# Patient Record
Sex: MALE | Race: WHITE | NOT HISPANIC OR LATINO | URBAN - METROPOLITAN AREA
[De-identification: names, ages, dates, MRNs, and addresses within clinical notes are randomized per-mention and may not be internally consistent; named-entity substitution may affect disease eponyms.]

---

## 2018-09-25 ENCOUNTER — OUTPATIENT (OUTPATIENT)
Dept: OUTPATIENT SERVICES | Facility: HOSPITAL | Age: 48
LOS: 1 days | Discharge: HOME | End: 2018-09-25

## 2018-09-26 DIAGNOSIS — G47.33 OBSTRUCTIVE SLEEP APNEA (ADULT) (PEDIATRIC): ICD-10-CM

## 2021-02-13 ENCOUNTER — TRANSCRIPTION ENCOUNTER (OUTPATIENT)
Age: 51
End: 2021-02-13

## 2021-02-24 ENCOUNTER — TRANSCRIPTION ENCOUNTER (OUTPATIENT)
Age: 51
End: 2021-02-24

## 2021-03-15 ENCOUNTER — TRANSCRIPTION ENCOUNTER (OUTPATIENT)
Age: 51
End: 2021-03-15

## 2021-07-26 ENCOUNTER — TRANSCRIPTION ENCOUNTER (OUTPATIENT)
Age: 51
End: 2021-07-26

## 2021-08-12 ENCOUNTER — TRANSCRIPTION ENCOUNTER (OUTPATIENT)
Age: 51
End: 2021-08-12

## 2021-11-21 ENCOUNTER — TRANSCRIPTION ENCOUNTER (OUTPATIENT)
Age: 51
End: 2021-11-21

## 2021-11-26 ENCOUNTER — TRANSCRIPTION ENCOUNTER (OUTPATIENT)
Age: 51
End: 2021-11-26

## 2021-12-22 ENCOUNTER — TRANSCRIPTION ENCOUNTER (OUTPATIENT)
Age: 51
End: 2021-12-22

## 2021-12-30 ENCOUNTER — TRANSCRIPTION ENCOUNTER (OUTPATIENT)
Age: 51
End: 2021-12-30

## 2022-12-09 ENCOUNTER — APPOINTMENT (OUTPATIENT)
Dept: CARDIOLOGY | Facility: CLINIC | Age: 52
End: 2022-12-09

## 2022-12-09 VITALS
DIASTOLIC BLOOD PRESSURE: 88 MMHG | BODY MASS INDEX: 30.49 KG/M2 | SYSTOLIC BLOOD PRESSURE: 132 MMHG | OXYGEN SATURATION: 97 % | WEIGHT: 213 LBS | HEART RATE: 71 BPM | HEIGHT: 70 IN

## 2022-12-09 DIAGNOSIS — Z78.9 OTHER SPECIFIED HEALTH STATUS: ICD-10-CM

## 2022-12-09 PROCEDURE — 93000 ELECTROCARDIOGRAM COMPLETE: CPT

## 2022-12-09 PROCEDURE — 99204 OFFICE O/P NEW MOD 45 MIN: CPT | Mod: 25

## 2022-12-09 RX ORDER — ESZOPICLONE 3 MG/1
3 TABLET, COATED ORAL
Refills: 0 | Status: ACTIVE | COMMUNITY

## 2022-12-09 NOTE — HISTORY OF PRESENT ILLNESS
[FreeTextEntry1] : Mr. Greenberg is a 52-year-old man with history of hyperlipidemia presenting for evaluation of an abnormal ECG and intermittent dyspnea/chest discomfort.\par \par At baseline patient is very physically active, exercising several times a week with occasional high intensity interval training. 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 Last week he had a cramping left upper abdominal discomfort that passed after eructating and has not returned since.\par \par ECG today shows NSR, normal axis, normal intervals.\par ECG at an OSH from last year showed inferior and lateral TWI.

## 2022-12-09 NOTE — ASSESSMENT
[FreeTextEntry1] : Mr. Greenberg is a 52-year-old man with history of hyperlipidemia presenting for evaluation of an abnormal ECG and intermittent dyspnea/chest discomfort.\par \par Impression:\par (1) Dyspnea on exertion, though still has a high exercise tolerance. Chest discomfort is non-cardiac by description (non-exertional, not better with rest, predominantly LUQ) and unlikely to be cardiac in origin.\par (2) Abnormal ECG - prior ECG with nonspecific T wave abnormality.\par (3) Mild HLD, \par \par Plan:\par - Check TTE\par - Check cardiometabolic labs\par - If patient develops atypical or typical chest discomfort, can consider for CCTA vs exercise stress testing.\par - Discussed lifestyle changes.\par \par RTC after the above testing.

## 2023-01-03 ENCOUNTER — APPOINTMENT (OUTPATIENT)
Dept: CARDIOLOGY | Facility: CLINIC | Age: 53
End: 2023-01-03

## 2023-01-30 ENCOUNTER — APPOINTMENT (OUTPATIENT)
Dept: CARDIOLOGY | Facility: CLINIC | Age: 53
End: 2023-01-30

## 2023-03-02 ENCOUNTER — APPOINTMENT (OUTPATIENT)
Dept: CARDIOLOGY | Facility: CLINIC | Age: 53
End: 2023-03-02
Payer: COMMERCIAL

## 2023-03-02 PROCEDURE — 93306 TTE W/DOPPLER COMPLETE: CPT

## 2023-03-03 ENCOUNTER — APPOINTMENT (OUTPATIENT)
Dept: CARDIOLOGY | Facility: CLINIC | Age: 53
End: 2023-03-03
Payer: COMMERCIAL

## 2023-03-03 VITALS
HEIGHT: 70 IN | DIASTOLIC BLOOD PRESSURE: 80 MMHG | BODY MASS INDEX: 30.92 KG/M2 | WEIGHT: 216 LBS | SYSTOLIC BLOOD PRESSURE: 120 MMHG

## 2023-03-03 DIAGNOSIS — R06.02 SHORTNESS OF BREATH: ICD-10-CM

## 2023-03-03 PROCEDURE — 99213 OFFICE O/P EST LOW 20 MIN: CPT | Mod: 25

## 2023-03-03 PROCEDURE — 93000 ELECTROCARDIOGRAM COMPLETE: CPT

## 2023-03-03 NOTE — ASSESSMENT
[FreeTextEntry1] : Mr. Greenberg is a 52-year-old man with history of hyperlipidemia presenting for evaluation of an abnormal ECG and intermittent dyspnea/chest discomfort.\par \par Impression:\par (1) Dyspnea on exertion, though still has a high exercise tolerance. Chest discomfort is non-cardiac by description (non-exertional, not better with rest, predominantly LUQ) and unlikely to be cardiac in origin.\par (2) Abnormal ECG - prior ECG with nonspecific T wave abnormality.\par (3) Mild HLD, ASCVD 3.8%\par \par Plan:\par - Discussed results from TTE and labs in detail.\par - Discussed the utility overall of CAC scoring. Patient is interested - will facilitate.\par - Repeat labs in 6-12 months to reassess LDL.\par - Lifestyle discussed\par \par RTC after the above testing.

## 2023-03-03 NOTE — HISTORY OF PRESENT ILLNESS
[FreeTextEntry1] : Mr. Greenberg is a 52-year-old man with history of hyperlipidemia presenting for evaluation of an abnormal ECG and intermittent dyspnea/chest discomfort.\par \par At baseline patient is very physically active, exercising several times a week with occasional high intensity interval training. 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 Last week he had a cramping left upper abdominal discomfort that passed after eructating and has not returned since.\par \par ECG today shows NSR, normal axis, normal intervals.\par ECG at an OSH from last year showed inferior and lateral TWI.\par \par TTE 3/2023\par - Normal biventricular function, no significant valvular disease\par \par Labs:\par - , HDL 41, TG 80, , non-\par - Cr 0.88, K 4.3\par - A1c 5.1%, pBNP <10, TSH 2.7, lp(a) 30, CRP 0.5 (<8)

## 2023-07-14 ENCOUNTER — NON-APPOINTMENT (OUTPATIENT)
Age: 53
End: 2023-07-14

## 2024-02-10 ENCOUNTER — INPATIENT (INPATIENT)
Facility: HOSPITAL | Age: 54
LOS: 1 days | Discharge: HOME | DRG: 392 | End: 2024-02-12
Attending: INTERNAL MEDICINE | Admitting: INTERNAL MEDICINE
Payer: COMMERCIAL

## 2024-02-10 VITALS
SYSTOLIC BLOOD PRESSURE: 185 MMHG | HEIGHT: 70 IN | DIASTOLIC BLOOD PRESSURE: 82 MMHG | TEMPERATURE: 98 F | WEIGHT: 205.03 LBS | RESPIRATION RATE: 18 BRPM | OXYGEN SATURATION: 99 % | HEART RATE: 107 BPM

## 2024-02-10 DIAGNOSIS — K57.92 DIVERTICULITIS OF INTESTINE, PART UNSPECIFIED, WITHOUT PERFORATION OR ABSCESS WITHOUT BLEEDING: ICD-10-CM

## 2024-02-10 LAB
ALBUMIN SERPL ELPH-MCNC: 4 G/DL — SIGNIFICANT CHANGE UP (ref 3.5–5.2)
ALP SERPL-CCNC: 70 U/L — SIGNIFICANT CHANGE UP (ref 30–115)
ALT FLD-CCNC: 19 U/L — SIGNIFICANT CHANGE UP (ref 0–41)
ANION GAP SERPL CALC-SCNC: 9 MMOL/L — SIGNIFICANT CHANGE UP (ref 7–14)
APPEARANCE UR: CLEAR — SIGNIFICANT CHANGE UP
AST SERPL-CCNC: 18 U/L — SIGNIFICANT CHANGE UP (ref 0–41)
BACTERIA # UR AUTO: ABNORMAL /HPF
BASOPHILS # BLD AUTO: 0.03 K/UL — SIGNIFICANT CHANGE UP (ref 0–0.2)
BASOPHILS NFR BLD AUTO: 0.2 % — SIGNIFICANT CHANGE UP (ref 0–1)
BILIRUB SERPL-MCNC: 0.5 MG/DL — SIGNIFICANT CHANGE UP (ref 0.2–1.2)
BILIRUB UR-MCNC: NEGATIVE — SIGNIFICANT CHANGE UP
BUN SERPL-MCNC: 7 MG/DL — LOW (ref 10–20)
CALCIUM SERPL-MCNC: 9.1 MG/DL — SIGNIFICANT CHANGE UP (ref 8.4–10.5)
CHLORIDE SERPL-SCNC: 88 MMOL/L — LOW (ref 98–110)
CO2 SERPL-SCNC: 27 MMOL/L — SIGNIFICANT CHANGE UP (ref 17–32)
COLOR SPEC: YELLOW — SIGNIFICANT CHANGE UP
CREAT SERPL-MCNC: 1 MG/DL — SIGNIFICANT CHANGE UP (ref 0.7–1.5)
DIFF PNL FLD: ABNORMAL
EGFR: 91 ML/MIN/1.73M2 — SIGNIFICANT CHANGE UP
EOSINOPHIL # BLD AUTO: 0.04 K/UL — SIGNIFICANT CHANGE UP (ref 0–0.7)
EOSINOPHIL NFR BLD AUTO: 0.2 % — SIGNIFICANT CHANGE UP (ref 0–8)
GLUCOSE SERPL-MCNC: 113 MG/DL — HIGH (ref 70–99)
GLUCOSE UR QL: NEGATIVE MG/DL — SIGNIFICANT CHANGE UP
HCT VFR BLD CALC: 38.5 % — LOW (ref 42–52)
HGB BLD-MCNC: 13.8 G/DL — LOW (ref 14–18)
IMM GRANULOCYTES NFR BLD AUTO: 0.4 % — HIGH (ref 0.1–0.3)
KETONES UR-MCNC: NEGATIVE MG/DL — SIGNIFICANT CHANGE UP
LACTATE SERPL-SCNC: 0.8 MMOL/L — SIGNIFICANT CHANGE UP (ref 0.7–2)
LEUKOCYTE ESTERASE UR-ACNC: NEGATIVE — SIGNIFICANT CHANGE UP
LIDOCAIN IGE QN: 24 U/L — SIGNIFICANT CHANGE UP (ref 7–60)
LYMPHOCYTES # BLD AUTO: 1.46 K/UL — SIGNIFICANT CHANGE UP (ref 1.2–3.4)
LYMPHOCYTES # BLD AUTO: 9.1 % — LOW (ref 20.5–51.1)
MCHC RBC-ENTMCNC: 32.1 PG — HIGH (ref 27–31)
MCHC RBC-ENTMCNC: 35.8 G/DL — SIGNIFICANT CHANGE UP (ref 32–37)
MCV RBC AUTO: 89.5 FL — SIGNIFICANT CHANGE UP (ref 80–94)
MONOCYTES # BLD AUTO: 1 K/UL — HIGH (ref 0.1–0.6)
MONOCYTES NFR BLD AUTO: 6.2 % — SIGNIFICANT CHANGE UP (ref 1.7–9.3)
NEUTROPHILS # BLD AUTO: 13.49 K/UL — HIGH (ref 1.4–6.5)
NEUTROPHILS NFR BLD AUTO: 83.9 % — HIGH (ref 42.2–75.2)
NITRITE UR-MCNC: NEGATIVE — SIGNIFICANT CHANGE UP
NRBC # BLD: 0 /100 WBCS — SIGNIFICANT CHANGE UP (ref 0–0)
PH UR: 7 — SIGNIFICANT CHANGE UP (ref 5–8)
PLATELET # BLD AUTO: 385 K/UL — SIGNIFICANT CHANGE UP (ref 130–400)
PMV BLD: 7.9 FL — SIGNIFICANT CHANGE UP (ref 7.4–10.4)
POTASSIUM SERPL-MCNC: 4.1 MMOL/L — SIGNIFICANT CHANGE UP (ref 3.5–5)
POTASSIUM SERPL-SCNC: 4.1 MMOL/L — SIGNIFICANT CHANGE UP (ref 3.5–5)
PROT SERPL-MCNC: 6.8 G/DL — SIGNIFICANT CHANGE UP (ref 6–8)
PROT UR-MCNC: NEGATIVE MG/DL — SIGNIFICANT CHANGE UP
RBC # BLD: 4.3 M/UL — LOW (ref 4.7–6.1)
RBC # FLD: 11.5 % — SIGNIFICANT CHANGE UP (ref 11.5–14.5)
RBC CASTS # UR COMP ASSIST: 1 /HPF — SIGNIFICANT CHANGE UP (ref 0–4)
SODIUM SERPL-SCNC: 124 MMOL/L — LOW (ref 135–146)
SP GR SPEC: 1.01 — SIGNIFICANT CHANGE UP (ref 1–1.03)
UROBILINOGEN FLD QL: 0.2 MG/DL — SIGNIFICANT CHANGE UP (ref 0.2–1)
WBC # BLD: 16.08 K/UL — HIGH (ref 4.8–10.8)
WBC # FLD AUTO: 16.08 K/UL — HIGH (ref 4.8–10.8)
WBC UR QL: 1 /HPF — SIGNIFICANT CHANGE UP (ref 0–5)

## 2024-02-10 PROCEDURE — 84100 ASSAY OF PHOSPHORUS: CPT

## 2024-02-10 PROCEDURE — 36415 COLL VENOUS BLD VENIPUNCTURE: CPT

## 2024-02-10 PROCEDURE — 74177 CT ABD & PELVIS W/CONTRAST: CPT | Mod: 26,MA

## 2024-02-10 PROCEDURE — 83735 ASSAY OF MAGNESIUM: CPT

## 2024-02-10 PROCEDURE — 93005 ELECTROCARDIOGRAM TRACING: CPT

## 2024-02-10 PROCEDURE — 85025 COMPLETE CBC W/AUTO DIFF WBC: CPT

## 2024-02-10 PROCEDURE — 99285 EMERGENCY DEPT VISIT HI MDM: CPT

## 2024-02-10 PROCEDURE — 99222 1ST HOSP IP/OBS MODERATE 55: CPT

## 2024-02-10 PROCEDURE — 99223 1ST HOSP IP/OBS HIGH 75: CPT

## 2024-02-10 PROCEDURE — 99221 1ST HOSP IP/OBS SF/LOW 40: CPT

## 2024-02-10 PROCEDURE — 93010 ELECTROCARDIOGRAM REPORT: CPT

## 2024-02-10 PROCEDURE — 80053 COMPREHEN METABOLIC PANEL: CPT

## 2024-02-10 RX ORDER — SODIUM CHLORIDE 9 MG/ML
1000 INJECTION, SOLUTION INTRAVENOUS
Refills: 0 | Status: DISCONTINUED | OUTPATIENT
Start: 2024-02-10 | End: 2024-02-12

## 2024-02-10 RX ORDER — ACETAMINOPHEN 500 MG
650 TABLET ORAL EVERY 6 HOURS
Refills: 0 | Status: DISCONTINUED | OUTPATIENT
Start: 2024-02-10 | End: 2024-02-12

## 2024-02-10 RX ORDER — ESZOPICLONE 2 MG/1
1 TABLET, COATED ORAL
Refills: 0 | DISCHARGE

## 2024-02-10 RX ORDER — ONDANSETRON 8 MG/1
4 TABLET, FILM COATED ORAL ONCE
Refills: 0 | Status: COMPLETED | OUTPATIENT
Start: 2024-02-10 | End: 2024-02-10

## 2024-02-10 RX ORDER — CIPROFLOXACIN LACTATE 400MG/40ML
400 VIAL (ML) INTRAVENOUS EVERY 12 HOURS
Refills: 0 | Status: DISCONTINUED | OUTPATIENT
Start: 2024-02-11 | End: 2024-02-12

## 2024-02-10 RX ORDER — MORPHINE SULFATE 50 MG/1
2 CAPSULE, EXTENDED RELEASE ORAL EVERY 6 HOURS
Refills: 0 | Status: DISCONTINUED | OUTPATIENT
Start: 2024-02-10 | End: 2024-02-12

## 2024-02-10 RX ORDER — METRONIDAZOLE 500 MG
500 TABLET ORAL EVERY 8 HOURS
Refills: 0 | Status: DISCONTINUED | OUTPATIENT
Start: 2024-02-10 | End: 2024-02-12

## 2024-02-10 RX ORDER — ONDANSETRON 8 MG/1
4 TABLET, FILM COATED ORAL EVERY 8 HOURS
Refills: 0 | Status: DISCONTINUED | OUTPATIENT
Start: 2024-02-10 | End: 2024-02-12

## 2024-02-10 RX ORDER — METRONIDAZOLE 500 MG
500 TABLET ORAL ONCE
Refills: 0 | Status: COMPLETED | OUTPATIENT
Start: 2024-02-10 | End: 2024-02-10

## 2024-02-10 RX ORDER — MORPHINE SULFATE 50 MG/1
4 CAPSULE, EXTENDED RELEASE ORAL ONCE
Refills: 0 | Status: DISCONTINUED | OUTPATIENT
Start: 2024-02-10 | End: 2024-02-10

## 2024-02-10 RX ORDER — LANOLIN ALCOHOL/MO/W.PET/CERES
3 CREAM (GRAM) TOPICAL AT BEDTIME
Refills: 0 | Status: DISCONTINUED | OUTPATIENT
Start: 2024-02-10 | End: 2024-02-12

## 2024-02-10 RX ORDER — SODIUM CHLORIDE 9 MG/ML
1000 INJECTION INTRAMUSCULAR; INTRAVENOUS; SUBCUTANEOUS ONCE
Refills: 0 | Status: COMPLETED | OUTPATIENT
Start: 2024-02-10 | End: 2024-02-10

## 2024-02-10 RX ADMIN — ONDANSETRON 4 MILLIGRAM(S): 8 TABLET, FILM COATED ORAL at 15:38

## 2024-02-10 RX ADMIN — MORPHINE SULFATE 4 MILLIGRAM(S): 50 CAPSULE, EXTENDED RELEASE ORAL at 18:36

## 2024-02-10 RX ADMIN — MORPHINE SULFATE 2 MILLIGRAM(S): 50 CAPSULE, EXTENDED RELEASE ORAL at 23:19

## 2024-02-10 RX ADMIN — Medication 100 MILLIGRAM(S): at 21:27

## 2024-02-10 RX ADMIN — SODIUM CHLORIDE 100 MILLILITER(S): 9 INJECTION, SOLUTION INTRAVENOUS at 19:55

## 2024-02-10 RX ADMIN — Medication 100 MILLIGRAM(S): at 18:38

## 2024-02-10 RX ADMIN — SODIUM CHLORIDE 1000 MILLILITER(S): 9 INJECTION INTRAMUSCULAR; INTRAVENOUS; SUBCUTANEOUS at 15:38

## 2024-02-10 NOTE — ED PROVIDER NOTE - CLINICAL SUMMARY MEDICAL DECISION MAKING FREE TEXT BOX
53-year-old male presents to the ED for abdominal pain.  Recently treated for diverticulitis.  On Cipro and Flagyl.  Due to persistent pain patient came in for evaluation.  Noted to be tachycardic here.  Diffuse lower abdominal pain.  Labs reviewed by me noted to have leukocytosis with hyponatremia and hypochloremia likely from persistent diarrhea.  CT revealed sigmoid diverticulitis with developing abscess.  Started on parenteral antibiotics.  Admitted to medicine.

## 2024-02-10 NOTE — H&P ADULT - NSHPPHYSICALEXAM_GEN_ALL_CORE
Vital Signs Last 24 Hrs  T(C): 36.7 (10 Feb 2024 14:42), Max: 36.7 (10 Feb 2024 14:42)  T(F): 98.1 (10 Feb 2024 14:42), Max: 98.1 (10 Feb 2024 14:42)  HR: 107 (10 Feb 2024 14:42) (107 - 107)  BP: 185/82 (10 Feb 2024 14:42) (185/82 - 185/82)  RR: 18 (10 Feb 2024 14:42) (18 - 18)  SpO2: 99% (10 Feb 2024 14:42) (99% - 99%)    Parameters below as of 10 Feb 2024 14:42  Patient On (Oxygen Delivery Method): room air    PHYSICAL EXAM:  GENERAL: NAD, well-developed  HEAD:  Atraumatic, Normocephalic  EYES: EOMI, PERRLA, conjunctiva and sclera clear  CHEST/LUNG: Clear to auscultation bilaterally; No wheeze  HEART: Regular rate and rhythm; No murmurs, rubs, or gallops. distal pulses 2+ and equal bilateral  ABDOMEN: Soft, nondistended + TTP low abdomen  EXTREMITIES:  No clubbing, cyanosis, or edema  CNS: AAOx3

## 2024-02-10 NOTE — ED PROVIDER NOTE - PHYSICAL EXAMINATION
Physical Exam    Vital Signs: I have reviewed the initial vital signs.  Constitutional: appears stated age, no acute distress  Eyes: Sclera clear, EOMI.  Cardiovascular: S1 and S2, regular rate, regular rhythm, well-perfused extremities, radial pulses equal and 2+, pedal pulses 2+ and equal  Respiratory: unlabored respiratory effort, clear to auscultation bilaterally no wheezing, rales, or rhonchi  Gastrointestinal:  abdomen soft, +tenderness to left lower quadrant  Musculoskeletal: supple neck, no lower extremity edema  Integumentary: warm, dry, no rash  Neurologic: awake, alert, oriented x3, extremities’ motor and sensory functions grossly intact

## 2024-02-10 NOTE — H&P ADULT - NS ATTEND AMEND GEN_ALL_CORE FT
Pt presents with acute diverticulitis with possible abscess/phlegmon.   -IV abx ciprofloxacin and Flagyl  -IVF  -Clear liquid diet for now  Surgery, GI consults consult    Continue home meds as ordered.

## 2024-02-10 NOTE — PATIENT PROFILE ADULT - FALL HARM RISK - UNIVERSAL INTERVENTIONS
Bed in lowest position, wheels locked, appropriate side rails in place/Call bell, personal items and telephone in reach/Instruct patient to call for assistance before getting out of bed or chair/Non-slip footwear when patient is out of bed/[place] to call system/Physically safe environment - no spills, clutter or unnecessary equipment/Purposeful Proactive Rounding/Room/bathroom lighting operational, light cord in reach

## 2024-02-10 NOTE — H&P ADULT - HISTORY OF PRESENT ILLNESS
Patient is a 53 year old male with hx of diverticulosis (1 flare Oct 2023) presenting with 3 days of worsening low abdominal pain associated with non-bloody diarrhea. Patient was started on cipro and flagyl PO outpatient without improvement to symptoms. Patient had colonoscopy 4 weeks ago as follow up after resolution of first diverticulitis flare in October 2023 which revealed polyp and diverticulosis.   Patient denies fever, chills, cp, sob, nausea, vomiting. + dysuria, resolved

## 2024-02-10 NOTE — CONSULT NOTE ADULT - NSCONSULTADDITIONALINFOA_GEN_ALL_CORE
I saw and examined the patient.  Agree with above assessment plan.  Extensive discussion with the patient at bedside reviewed the CT findings and the clinical course of diverticulitis.  I explained the relationship of fiber and diverticulosis to the development of diverticulitis.  All his questions were answered.  Will continue nonoperative management.  The patient may need repeat CT scan in the future however this will be determined by the clinical course.

## 2024-02-10 NOTE — ED PROVIDER NOTE - OBJECTIVE STATEMENT
53-year-old male with past medical history diverticulosis presents with complaint abdominal pain.  Patient reports he has had symptoms of lower abdominal pain, abdominal bloating, diarrhea (6-8 times per day, nonbloody), as well as dysuria since Wednesday.  Reports on Thursday his GI sent him ciprofloxacin and Flagyl, which he began that day.  Reports full resolution of dysuria since then, but reports he still has abdominal pain and generalized malaise, prompting emergency department evaluation.  Denies fever/chills, chest pain, shortness of breath, vomiting, lightheadedness, dizziness.

## 2024-02-10 NOTE — CONSULT NOTE ADULT - SUBJECTIVE AND OBJECTIVE BOX
Patient is a 54yo m w/ pmhx of diverticulosis presenting w/ complaints of generalized lower abdominal pain since Wednesday. Reports constant pain, 7/10, associated w/ intermittent subjective fever and chills. Also endorses fatigue, severe bloating and non bloody diarrhea for the past dew days. Reports spoke w/ his GI Dr. Velazco two days ago who prescribed him cipro and flagyl but no relief in the sxs prompting ED visit today. Reports last colonoscopy on  w/ finding of diverticulosis, last flare up of diverticulitis was back in september last year. Denies n/v, chest pain, sob, melena, hematochezia, urinary sxs.     PAST MEDICAL & SURGICAL HISTORY:  Insomnia  Diverticulosis    MEDICATIONS  (STANDING):  lactated ringers. 1000 milliLiter(s) (100 mL/Hr) IV Continuous <Continuous>  levoFLOXacin IVPB 500 milliGRAM(s) IV Intermittent once  metroNIDAZOLE  IVPB 500 milliGRAM(s) IV Intermittent every 8 hours  metroNIDAZOLE  IVPB 500 milliGRAM(s) IV Intermittent Once  morphine  - Injectable 4 milliGRAM(s) IV Push Once    MEDICATIONS  (PRN):  acetaminophen     Tablet .. 650 milliGRAM(s) Oral every 6 hours PRN Temp greater or equal to 38C (100.4F), Mild Pain (1 - 3)  melatonin 3 milliGRAM(s) Oral at bedtime PRN Insomnia  ondansetron Injectable 4 milliGRAM(s) IV Push every 8 hours PRN Nausea and/or Vomiting    Allergies  penicillin (Unknown)  Intolerances    Physical Exam:  General: NAD, resting comfortably  HEENT: EOMI, [place] clear   Pulmonary: CTA B/L  Cardiovascular: S1, S2 w/RRR  Abdominal: soft, mild distension, mild ttp LLQ, no rebound/guarding   Extremities:  no clubbing/cyanosis/edema  Neuro: A/O x 3    Vital Signs Last 24 Hrs  T(C): 36.7 (10 Feb 2024 14:42), Max: 36.7 (10 Feb 2024 14:42)  T(F): 98.1 (10 Feb 2024 14:42), Max: 98.1 (10 Feb 2024 14:42)  HR: 107 (10 Feb 2024 14:42) (107 - 107)  BP: 185/82 (10 Feb 2024 14:42) (185/82 - 185/82)  RR: 18 (10 Feb 2024 14:42) (18 - 18)  SpO2: 99% (10 Feb 2024 14:42) (99% - 99%)    Parameters below as of 10 Feb 2024 14:42  Patient On (Oxygen Delivery Method): room air      LABS:                        13.8   16.08 )-----------( 385      ( 10 Feb 2024 15:40 )             38.5     0210    124<L>  |  88<L>  |  7<L>  ----------------------------<  113<H>  4.1   |  27  |  1.0    Ca    9.1      10 Feb 2024 15:40    TPro  6.8  /  Alb  4.0  /  TBili  0.5  /  DBili  x   /  AST  18  /  ALT  19  /  AlkPhos  70  02-10      Urinalysis Basic - ( 10 Feb 2024 16:30 )    Color: Yellow / Appearance: Clear / S.006 / pH: x  Gluc: x / Ketone: Negative mg/dL  / Bili: Negative / Urobili: 0.2 mg/dL   Blood: x / Protein: Negative mg/dL / Nitrite: Negative   Leuk Esterase: Negative / RBC: 1 /HPF / WBC 1 /HPF   Sq Epi: x / Non Sq Epi: x / Bacteria: Occasional /HPF      LIVER FUNCTIONS - ( 10 Feb 2024 15:40 )  Alb: 4.0 g/dL / Pro: 6.8 g/dL / ALK PHOS: 70 U/L / ALT: 19 U/L / AST: 18 U/L / GGT: x             Cultures:      RADIOLOGY & ADDITIONAL STUDIES:    < from: CT Abdomen and Pelvis w/ IV Cont (02.10.24 @ 16:29) >  IMPRESSION:  1.  Acute sigmoid diverticulitis.  2.  Adjacent to the inflamed colon there appears to be few locules of   extraluminal air along with fluid and stranding though difficult to   separate from the adjacent small bowel loops (series 301 image 81) which   may represent phlegmon/developing abscess however no discrete walled off   drainable collection is identified at this time. No gross   pneumoperitoneum. Small amount free fluid along the right paracolic   gutter. If no clinical improvement, short interval follow-up can be   considered to assess for abscess formation.  --- End of Report ---

## 2024-02-10 NOTE — H&P ADULT - NSHPLABSRESULTS_GEN_ALL_CORE
13.8   16.08 )-----------( 385      ( 10 Feb 2024 15:40 )             38.5     02-10    124<L>  |  88<L>  |  7<L>  ----------------------------<  113<H>  4.1   |  27  |  1.0    Ca    9.1      10 Feb 2024 15:40    TPro  6.8  /  Alb  4.0  /  TBili  0.5  /  DBili  x   /  AST  18  /  ALT  19  /  AlkPhos  70  02-10          Urinalysis Basic - ( 10 Feb 2024 16:30 )    Color: Yellow / Appearance: Clear / S.006 / pH: x  Gluc: x / Ketone: Negative mg/dL  / Bili: Negative / Urobili: 0.2 mg/dL   Blood: x / Protein: Negative mg/dL / Nitrite: Negative   Leuk Esterase: Negative / RBC: 1 /HPF / WBC 1 /HPF   Sq Epi: x / Non Sq Epi: x / Bacteria: Occasional /HPF    Lactate Trend  02-10 @ 15:40 Lactate:0.8       CT Abdomen and Pelvis w/ IV Cont (02.10.24 @ 16:29)     IMPRESSION:  1.  Acute sigmoid diverticulitis.  2.  Adjacent to the inflamed colon there appears to be few locules of   extraluminal air along with fluid and stranding though difficult to   separate from the adjacent small bowel loops (series 301 image 81) which   may represent phlegmon/developing abscess however no discrete walled off   drainable collection is identified at this time. No gross   pneumoperitoneum. Small amount free fluid along the right paracolic   gutter. If no clinical improvement, short interval follow-up can be   considered to assess for abscess formation.

## 2024-02-11 LAB
ALBUMIN SERPL ELPH-MCNC: 3.5 G/DL — SIGNIFICANT CHANGE UP (ref 3.5–5.2)
ALP SERPL-CCNC: 59 U/L — SIGNIFICANT CHANGE UP (ref 30–115)
ALT FLD-CCNC: 14 U/L — SIGNIFICANT CHANGE UP (ref 0–41)
ANION GAP SERPL CALC-SCNC: 8 MMOL/L — SIGNIFICANT CHANGE UP (ref 7–14)
AST SERPL-CCNC: 14 U/L — SIGNIFICANT CHANGE UP (ref 0–41)
BASOPHILS # BLD AUTO: 0.03 K/UL — SIGNIFICANT CHANGE UP (ref 0–0.2)
BASOPHILS NFR BLD AUTO: 0.3 % — SIGNIFICANT CHANGE UP (ref 0–1)
BILIRUB SERPL-MCNC: 0.5 MG/DL — SIGNIFICANT CHANGE UP (ref 0.2–1.2)
BUN SERPL-MCNC: 6 MG/DL — LOW (ref 10–20)
CALCIUM SERPL-MCNC: 8.6 MG/DL — SIGNIFICANT CHANGE UP (ref 8.4–10.5)
CHLORIDE SERPL-SCNC: 98 MMOL/L — SIGNIFICANT CHANGE UP (ref 98–110)
CO2 SERPL-SCNC: 26 MMOL/L — SIGNIFICANT CHANGE UP (ref 17–32)
CREAT SERPL-MCNC: 0.8 MG/DL — SIGNIFICANT CHANGE UP (ref 0.7–1.5)
CULTURE RESULTS: NO GROWTH — SIGNIFICANT CHANGE UP
EGFR: 106 ML/MIN/1.73M2 — SIGNIFICANT CHANGE UP
EOSINOPHIL # BLD AUTO: 0.05 K/UL — SIGNIFICANT CHANGE UP (ref 0–0.7)
EOSINOPHIL NFR BLD AUTO: 0.5 % — SIGNIFICANT CHANGE UP (ref 0–8)
GLUCOSE SERPL-MCNC: 106 MG/DL — HIGH (ref 70–99)
HCT VFR BLD CALC: 35 % — LOW (ref 42–52)
HGB BLD-MCNC: 12.6 G/DL — LOW (ref 14–18)
IMM GRANULOCYTES NFR BLD AUTO: 0.5 % — HIGH (ref 0.1–0.3)
LYMPHOCYTES # BLD AUTO: 1.13 K/UL — LOW (ref 1.2–3.4)
LYMPHOCYTES # BLD AUTO: 10.2 % — LOW (ref 20.5–51.1)
MAGNESIUM SERPL-MCNC: 1.9 MG/DL — SIGNIFICANT CHANGE UP (ref 1.8–2.4)
MCHC RBC-ENTMCNC: 32.3 PG — HIGH (ref 27–31)
MCHC RBC-ENTMCNC: 36 G/DL — SIGNIFICANT CHANGE UP (ref 32–37)
MCV RBC AUTO: 89.7 FL — SIGNIFICANT CHANGE UP (ref 80–94)
MONOCYTES # BLD AUTO: 0.74 K/UL — HIGH (ref 0.1–0.6)
MONOCYTES NFR BLD AUTO: 6.7 % — SIGNIFICANT CHANGE UP (ref 1.7–9.3)
NEUTROPHILS # BLD AUTO: 9.1 K/UL — HIGH (ref 1.4–6.5)
NEUTROPHILS NFR BLD AUTO: 81.8 % — HIGH (ref 42.2–75.2)
NRBC # BLD: 0 /100 WBCS — SIGNIFICANT CHANGE UP (ref 0–0)
PHOSPHATE SERPL-MCNC: 2.5 MG/DL — SIGNIFICANT CHANGE UP (ref 2.1–4.9)
PLATELET # BLD AUTO: 381 K/UL — SIGNIFICANT CHANGE UP (ref 130–400)
PMV BLD: 8 FL — SIGNIFICANT CHANGE UP (ref 7.4–10.4)
POTASSIUM SERPL-MCNC: 4.5 MMOL/L — SIGNIFICANT CHANGE UP (ref 3.5–5)
POTASSIUM SERPL-SCNC: 4.5 MMOL/L — SIGNIFICANT CHANGE UP (ref 3.5–5)
PROT SERPL-MCNC: 5.9 G/DL — LOW (ref 6–8)
RBC # BLD: 3.9 M/UL — LOW (ref 4.7–6.1)
RBC # FLD: 11.6 % — SIGNIFICANT CHANGE UP (ref 11.5–14.5)
SODIUM SERPL-SCNC: 132 MMOL/L — LOW (ref 135–146)
SPECIMEN SOURCE: SIGNIFICANT CHANGE UP
WBC # BLD: 11.11 K/UL — HIGH (ref 4.8–10.8)
WBC # FLD AUTO: 11.11 K/UL — HIGH (ref 4.8–10.8)

## 2024-02-11 PROCEDURE — 93010 ELECTROCARDIOGRAM REPORT: CPT

## 2024-02-11 PROCEDURE — 99223 1ST HOSP IP/OBS HIGH 75: CPT

## 2024-02-11 PROCEDURE — 99232 SBSQ HOSP IP/OBS MODERATE 35: CPT

## 2024-02-11 RX ADMIN — MORPHINE SULFATE 2 MILLIGRAM(S): 50 CAPSULE, EXTENDED RELEASE ORAL at 06:12

## 2024-02-11 RX ADMIN — Medication 100 MILLIGRAM(S): at 13:24

## 2024-02-11 RX ADMIN — Medication 200 MILLIGRAM(S): at 17:43

## 2024-02-11 RX ADMIN — MORPHINE SULFATE 2 MILLIGRAM(S): 50 CAPSULE, EXTENDED RELEASE ORAL at 04:27

## 2024-02-11 RX ADMIN — SODIUM CHLORIDE 100 MILLILITER(S): 9 INJECTION, SOLUTION INTRAVENOUS at 17:45

## 2024-02-11 RX ADMIN — Medication 100 MILLIGRAM(S): at 21:45

## 2024-02-11 RX ADMIN — MORPHINE SULFATE 2 MILLIGRAM(S): 50 CAPSULE, EXTENDED RELEASE ORAL at 05:31

## 2024-02-11 RX ADMIN — Medication 200 MILLIGRAM(S): at 07:35

## 2024-02-11 RX ADMIN — Medication 100 MILLIGRAM(S): at 05:26

## 2024-02-11 NOTE — CONSULT NOTE ADULT - ASSESSMENT
52yo male h/o diverticular disease presents with abdominal pain and diarrhea with CT imaging showing acute sigmoid diverticulitis with possible phlegmon/developing abscess      #Acute sigmoid diverticulitis, recurrent  CT revealing few locules of  extraluminal air along with fluid and stranding which   may represent phlegmon/developing abscess  -Recommend   -Continue IV antibiotics  -Serial abd exams  -If clinical worsening, rising leucocytosis, fever/chills within 24-48 hours recommend repeat CT abd/pelvis  -Surgery following 52yo male h/o diverticular disease/diverticulitis presents with abdominal pain and diarrhea x 2-3 days with CT imaging showing acute sigmoid diverticulitis with possible phlegmon/developing abscess.       #Acute sigmoid diverticulitis, recurrent  CT revealing few locules of  extraluminal air along with fluid and stranding which   may represent phlegmon/developing abscess  Colonoscopy in 1/2024 in [place] revealing 5mm ascending colon polyp and left sided diverticulosis (report reviewed)  -Recommend clear liquid diet for now if ok per surgery  -Continue IV antibiotics  -Serial abd exams  -If clinical worsening, rising leucocytosis, fever/chills within 24-48 hours recommend repeat CT abd/pelvis  -Surgery following  -Recent colonoscopy noted, pt will resume outpt follow up with outside GI in [place]   Discussed with medicine attending

## 2024-02-11 NOTE — PROGRESS NOTE ADULT - ASSESSMENT
53 year old male with hx of diverticulosis (1 flare Oct 2023) presenting with 3 days of worsening low abdominal pain associated with non-bloody diarrhea, found to have acute diverticulitis with phlegmon/early abscess.  Patient had colonoscopy 4 weeks ago as follow up after resolution of first diverticulitis flare in October 2023 which revealed polyp and diverticulosis.     # acute sigmoid diverticulitis with phlegmon  - clear liquid diet  - IVF LR @100  - pain control  - IV cipro, flagyl  - surgery consult appreciated  - GI consult appreciated  - zofran PRN    # hyponatremia, hypochloremia  - IVF LR @ 100, Na improved    # elevated BP likely 2/2 pain, monitor    # insomnia  - family to bring home [place]    Diet: clear  Activity: OOB  DVT PPX: not indicated  GI PPX: not indicated  CHG:  not indicated  Code status: FULL CODE  Dispo: acute from home

## 2024-02-11 NOTE — PROGRESS NOTE ADULT - SUBJECTIVE AND OBJECTIVE BOX
Patient seen at bedside. No acute complaints at time. No acute events noted overnight.   Reports abdominal pain and bloating significantly improved, had one loose BM over night and + Flatus. Denies fever, chills, n/v.    Vital Signs Last 24 Hrs  T(C): 35.8 (2024 05:19), Max: 36.7 (10 Feb 2024 14:42)  T(F): 96.4 (2024 05:19), Max: 98.1 (10 Feb 2024 14:42)  HR: 73 (2024 05:19) (73 - 107)  BP: 132/68 (2024 05:19) (132/68 - 185/82)  RR: 18 (2024 05:19) (18 - 18)  SpO2: 97% (10 Feb 2024 19:22) (97% - 99%)    Parameters below as of 10 Feb 2024 19:22  Patient On (Oxygen Delivery Method): room air    General Appearance: NAD  Chest: CTA B/L  CV: S1 S2  Abdomen: Soft, mild distention, no rebound/gaurding  Extremities: No edema BLE  CNS: A/O x 3    MEDICATIONS  (STANDING):  ciprofloxacin   IVPB 400 milliGRAM(s) IV Intermittent every 12 hours  lactated ringers. 1000 milliLiter(s) (100 mL/Hr) IV Continuous <Continuous>  metroNIDAZOLE  IVPB 500 milliGRAM(s) IV Intermittent every 8 hours    MEDICATIONS  (PRN):  acetaminophen     Tablet .. 650 milliGRAM(s) Oral every 6 hours PRN Temp greater or equal to 38C (100.4F), Mild Pain (1 - 3)  melatonin 3 milliGRAM(s) Oral at bedtime PRN Insomnia  morphine  - Injectable 2 milliGRAM(s) IV Push every 6 hours PRN Severe Pain (7 - 10)  ondansetron Injectable 4 milliGRAM(s) IV Push every 8 hours PRN Nausea and/or Vomiting  oxycodone    5 mG/acetaminophen 325 mG 1 Tablet(s) Oral every 6 hours PRN Moderate Pain (4 - 6)      LABS:                        12.6   11.11 )-----------( 381      ( 2024 11:53 )             35.0     02-10    124<L>  |  88<L>  |  7<L>  ----------------------------<  113<H>  4.1   |  27  |  1.0    Ca    9.1      10 Feb 2024 15:40    TPro  6.8  /  Alb  4.0  /  TBili  0.5  /  DBili  x   /  AST  18  /  ALT  19  /  AlkPhos  70  02-10      Urinalysis Basic - ( 10 Feb 2024 16:30 )    Color: Yellow / Appearance: Clear / S.006 / pH: x  Gluc: x / Ketone: Negative mg/dL  / Bili: Negative / Urobili: 0.2 mg/dL   Blood: x / Protein: Negative mg/dL / Nitrite: Negative   Leuk Esterase: Negative / RBC: 1 /HPF / WBC 1 /HPF   Sq Epi: x / Non Sq Epi: x / Bacteria: Occasional /HPF        RADIOLOGY & ADDITIONAL STUDIES: Reviewed

## 2024-02-11 NOTE — PROGRESS NOTE ADULT - SUBJECTIVE AND OBJECTIVE BOX
53 year old male with hx of diverticulosis (1 flare Oct 2023) presenting with 3 days of worsening low abdominal pain associated with non-bloody diarrhea, found to have acute diverticulitis with phlegmon/early abscess.  Patient had colonoscopy 4 weeks ago as follow up after resolution of first diverticulitis flare in October 2023 which revealed polyp and diverticulosis.     Today:  Seen at bedside, no new complaints.              REVIEW OF SYSTEMS:  No new complaints      MEDICATIONS  (STANDING):  ciprofloxacin   IVPB 400 milliGRAM(s) IV Intermittent every 12 hours  lactated ringers. 1000 milliLiter(s) (100 mL/Hr) IV Continuous <Continuous>  metroNIDAZOLE  IVPB 500 milliGRAM(s) IV Intermittent every 8 hours    MEDICATIONS  (PRN):  acetaminophen     Tablet .. 650 milliGRAM(s) Oral every 6 hours PRN Temp greater or equal to 38C (100.4F), Mild Pain (1 - 3)  melatonin 3 milliGRAM(s) Oral at bedtime PRN Insomnia  morphine  - Injectable 2 milliGRAM(s) IV Push every 6 hours PRN Severe Pain (7 - 10)  ondansetron Injectable 4 milliGRAM(s) IV Push every 8 hours PRN Nausea and/or Vomiting  oxycodone    5 mG/acetaminophen 325 mG 1 Tablet(s) Oral every 6 hours PRN Moderate Pain (4 - 6)      Allergies  penicillin (Unknown)          Vital Signs Last 24 Hrs  T(C): 35.9 (11 Feb 2024 15:00), Max: 36.7 (10 Feb 2024 18:46)  T(F): 96.6 (11 Feb 2024 15:00), Max: 98 (10 Feb 2024 18:46)  HR: 81 (11 Feb 2024 15:00) (73 - 99)  BP: 131/71 (11 Feb 2024 15:00) (131/71 - 144/76)  RR: 18 (11 Feb 2024 15:00) (18 - 18)  SpO2: 95% (11 Feb 2024 15:00) (95% - 99%)    Parameters below as of 11 Feb 2024 15:00  Patient On (Oxygen Delivery Method): room air        PHYSICAL EXAM:  GENERAL: NAD, well-developed  HEAD:  Atraumatic, Normocephalic  EYES: EOMI, PERRLA, conjunctiva and sclera clear  CHEST/LUNG: Clear to auscultation bilaterally; No wheeze  HEART: Regular rate and rhythm; No murmurs, rubs, or gallops. distal pulses 2+ and equal bilateral  ABDOMEN: Soft, nondistended + TTP low abdomen  EXTREMITIES:  No clubbing, cyanosis, or edema  CNS: AAOx3      LABS:                        12.6   11.11 )-----------( 381      ( 11 Feb 2024 11:53 )             35.0     02-11    132<L>  |  98  |  6<L>  ----------------------------<  106<H>  4.5   |  26  |  0.8    Ca    8.6      11 Feb 2024 11:53  Phos  2.5     02-11  Mg     1.9     02-11    TPro  5.9<L>  /  Alb  3.5  /  TBili  0.5  /  DBili  x   /  AST  14  /  ALT  14  /  AlkPhos  59  02-11      Urinalysis Basic - ( 11 Feb 2024 11:53 )    Color: x / Appearance: x / SG: x / pH: x  Gluc: 106 mg/dL / Ketone: x  / Bili: x / Urobili: x   Blood: x / Protein: x / Nitrite: x   Leuk Esterase: x / RBC: x / WBC x   Sq Epi: x / Non Sq Epi: x / Bacteria: x

## 2024-02-11 NOTE — PROGRESS NOTE ADULT - ASSESSMENT
Patient is a 52yo m w/ pmhx of diverticulosis presenting w/ complaints of generalized lower abdominal pain since Wednesday. Reports constant pain, 7/10, associated w/ intermittent subjective fever and chills. Also endorses fatigue, severe bloating and non bloody diarrhea for the past dew days. Reports spoke w/ his GI Dr. Velazco two days ago who prescribed him cipro and flagyl but no relief in the sxs prompting ED visit today. Reports last colonoscopy on 01/11124 w/ finding of diverticulosis, last flare up of diverticulitis was back in september last year. Denies n/v, chest pain, sob, melena, hematochezia, urinary sxs.     #Acute sigmoid diverticulitis w/ phlegmon  - CT AP: 1.  Acute sigmoid diverticulitis.  2.  Adjacent to the inflamed colon there appears to be few locules of   extraluminal air along with fluid and stranding though difficult to   separate from the adjacent small bowel loops (series 301 image 81) which   may represent phlegmon/developing abscess however no discrete walled off   drainable collection is identified at this time. No gross   pneumoperitoneum. Small amount free fluid along the right paracolic   gutter. If no clinical improvement, short interval follow-up can be   considered to assess for abscess formation.  - Advance diet to CLD  - IV abx   - GI following   - Pain control prn  - GI ppx   - IVF, monitor BMP  - Will continue to follow      Patient seen and above discussed w. Dr. Rodas

## 2024-02-11 NOTE — CONSULT NOTE ADULT - SUBJECTIVE AND OBJECTIVE BOX
Gastroenterology Consultation:    Patient is a 53y old  Male who presents with a chief complaint of acute complicated diverticulitis (10 Feb 2024 18:17)        Admitted on: 02-10-24      HPI:  52yo male h/o diverticulosis (Oct 2023) presenting with 3 days of worsening low abdominal pain associated with non-bloody diarrhea. Patient was started on cipro and flagyl PO outpatient without improvement. Pt reports colonoscopy 4 weeks ago as follow up after resolution of first diverticulitis flare in October 2023 which revealed polyp and diverticulosis. Patient denies nausea/vomiting or fever/chills.         Prior EGD:  Prior Colonoscopy:      PAST MEDICAL & SURGICAL HISTORY:  Insomnia      Diverticulosis            FAMILY HISTORY:      Social History:  Tobacco:  Alcohol:  Drugs:    Home Medications:  eszopiclone 3 mg oral tablet: 1 tab(s) orally once a day (at bedtime) as needed for  insomnia (10 Feb 2024 18:16)        MEDICATIONS  (STANDING):  ciprofloxacin   IVPB 400 milliGRAM(s) IV Intermittent every 12 hours  lactated ringers. 1000 milliLiter(s) (100 mL/Hr) IV Continuous <Continuous>  metroNIDAZOLE  IVPB 500 milliGRAM(s) IV Intermittent every 8 hours    MEDICATIONS  (PRN):  acetaminophen     Tablet .. 650 milliGRAM(s) Oral every 6 hours PRN Temp greater or equal to 38C (100.4F), Mild Pain (1 - 3)  melatonin 3 milliGRAM(s) Oral at bedtime PRN Insomnia  morphine  - Injectable 2 milliGRAM(s) IV Push every 6 hours PRN Severe Pain (7 - 10)  ondansetron Injectable 4 milliGRAM(s) IV Push every 8 hours PRN Nausea and/or Vomiting  oxycodone    5 mG/acetaminophen 325 mG 1 Tablet(s) Oral every 6 hours PRN Moderate Pain (4 - 6)      Allergies  penicillin (Unknown)      Review of Systems:   Constitutional:  No Fever, No Chills  ENT/Mouth:  No Hearing Changes,  No Difficulty Swallowing  Eyes:  No Eye Pain, No Vision Changes  Cardiovascular:  No Chest Pain, No Palpitations  Respiratory:  No Cough, No Dyspnea  Gastrointestinal:  As described in HPI  Musculoskeletal:  No Joint Swelling, No Back Pain  Skin:  No Skin Lesions, No Jaundice  Neuro:  No Syncope, No Dizziness  Heme/Lymph:  No Bruising, No Bleeding.          Physical Examination:  T(C): 35.8 (02-11-24 @ 05:19), Max: 36.7 (02-10-24 @ 14:42)  HR: 73 (02-11-24 @ 05:19) (73 - 107)  BP: 132/68 (02-11-24 @ 05:19) (132/68 - 185/82)  RR: 18 (02-11-24 @ 05:19) (18 - 18)  SpO2: 97% (02-10-24 @ 19:22) (97% - 99%)  Height (cm): 177.8 (02-10-24 @ 19:22)  Weight (kg): 85 (02-10-24 @ 19:22)        GENERAL: AAOx3, no acute distress.  HEAD:  Atraumatic, Normocephalic  EYES: conjunctiva and sclera clear  NECK: Supple, no JVD or thyromegaly  CHEST/LUNG: Clear to auscultation bilaterally; No wheeze, rhonchi, or rales  HEART: Regular rate and rhythm; normal S1, S2, No murmurs.  ABDOMEN: Soft, nontender, nondistended; Bowel sounds present  NEUROLOGY: No asterixis or tremor.   SKIN: Intact, no jaundice        Data:                        13.8   16.08 )-----------( 385      ( 10 Feb 2024 15:40 )             38.5     Hgb Trend:  13.8  02-10-24 @ 15:40      02-10    124<L>  |  88<L>  |  7<L>  ----------------------------<  113<H>  4.1   |  27  |  1.0    Ca    9.1      10 Feb 2024 15:40    TPro  6.8  /  Alb  4.0  /  TBili  0.5  /  DBili  x   /  AST  18  /  ALT  19  /  AlkPhos  70  02-10    Liver panel trend:  TBili 0.5   /   AST 18   /   ALT 19   /   AlkP 70   /   Tptn 6.8   /   Alb 4.0    /   DBili --      02-10              Radiology:  CT Abdomen and Pelvis w/ IV Cont:   ACC: [id number] EXAM:  CT ABDOMEN AND PELVIS IC   ORDERED BY: JESSICA ROB     PROCEDURE DATE:  02/10/2024          INTERPRETATION:  CLINICAL STATEMENT: Abdominal pain.    TECHNIQUE: Contiguous axial CT images were obtained from the lower chest   to the pubic symphysis following administration of intravenous contrast.    95 cc administered of Omnipaque 350 (5 cc discarded).  Oral contrast was   not administered.  Reformatted images in the coronal and sagittal planes   were acquired.    COMPARISON CT: None.    OTHER STUDIES USED FOR CORRELATION: None.      FINDINGS:    LOWER CHEST: Bibasilar streaky linear atelectasis and/or scarring.    HEPATOBILIARY: Unremarkable.    SPLEEN: Unremarkable.    PANCREAS: Unremarkable.    ADRENAL GLANDS: Unremarkable.    KIDNEYS: Section renal enhancement. No hydronephrosis.    ABDOMINOPELVIC NODES: Unremarkable.    PELVIC ORGANS: Unremarkable.    PERITONEUM/MESENTERY/BOWEL: Colonic diverticulosis. Wall thickening with   pericolonic inflammation surrounding the sigmoid colon consistent with   acute diverticulitis. Adjacent to this area there appears to be few   locules of extraluminal air along with fluid and stranding though   difficult to separate from the adjacent small bowel loops (series 301   image 81) which may represent phlegmon/developing abscess however no   discrete walled off drainable collection is identified at this time.   Small amount of free fluid along the right paracolic gutter. No large   gross pneumoperitoneum..    BONES/SOFT TISSUES: Mild degenerative changes.      IMPRESSION:  1.  Acute sigmoid diverticulitis.  2.  Adjacent to the inflamed colon there appears to be few locules of   extraluminal air along with fluid and stranding though difficult to   separate from the adjacent small bowel loops (series 301 image 81) which   may represent phlegmon/developing abscess however no discrete walled off   drainable collection is identified at this time. No gross   pneumoperitoneum. Small amount free fluid along the right paracolic   gutter. If no clinical improvement, short interval follow-up can be   considered to assess for abscess formation.    --- End of Report ---            MARICHUY DE LOS SANTOS MD; Attending Radiologist  This document has been electronically signed. Feb 10 2024  5:24PM (02-10-24 @ 16:29)       Gastroenterology Consultation:    Patient is a 53y old  Male who presents with a chief complaint of acute complicated diverticulitis (10 Feb 2024 18:17)        Admitted on: 02-10-24      HPI:  54yo male h/o diverticulitis (Oct 2023) presenting with worsening lower abdominal pain associated with non-bloody diarrhea x 2-3 days. Pt feeling well prior has regular bm 1-2x daily at baseline. Pt had colonoscopy following prior episode in 1/2024 in [place] that showed 5mm ascending colon polyp and left sided diverticulosis, advised repeat in 5 years per report. He contacted GI in [place] and was started on cipro and flagyl PO outpatient without improvement. Notes associated chills initially, no fever. Patient denies nausea/vomiting. Appetite ok, denies weight loss. Has been eating high fiber diet. No recent NSAID use. No known family h/o colon ca.       PAST MEDICAL & SURGICAL HISTORY:  Insomnia      Diverticulosis            FAMILY HISTORY:      Social History:  Tobacco:  Alcohol:  Drugs:    Home Medications:  eszopiclone 3 mg oral tablet: 1 tab(s) orally once a day (at bedtime) as needed for  insomnia (10 Feb 2024 18:16)        MEDICATIONS  (STANDING):  ciprofloxacin   IVPB 400 milliGRAM(s) IV Intermittent every 12 hours  lactated ringers. 1000 milliLiter(s) (100 mL/Hr) IV Continuous <Continuous>  metroNIDAZOLE  IVPB 500 milliGRAM(s) IV Intermittent every 8 hours    MEDICATIONS  (PRN):  acetaminophen     Tablet .. 650 milliGRAM(s) Oral every 6 hours PRN Temp greater or equal to 38C (100.4F), Mild Pain (1 - 3)  melatonin 3 milliGRAM(s) Oral at bedtime PRN Insomnia  morphine  - Injectable 2 milliGRAM(s) IV Push every 6 hours PRN Severe Pain (7 - 10)  ondansetron Injectable 4 milliGRAM(s) IV Push every 8 hours PRN Nausea and/or Vomiting  oxycodone    5 mG/acetaminophen 325 mG 1 Tablet(s) Oral every 6 hours PRN Moderate Pain (4 - 6)      Allergies  penicillin (Unknown)      Review of Systems:   Constitutional:  No Fever, No Chills  ENT/Mouth:  No Hearing Changes,  No Difficulty Swallowing  Eyes:  No Eye Pain, No Vision Changes  Cardiovascular:  No Chest Pain, No Palpitations  Respiratory:  No Cough, No Dyspnea  Gastrointestinal:  As described in HPI  Musculoskeletal:  No Joint Swelling, No Back Pain  Skin:  No Skin Lesions, No Jaundice  Neuro:  No Syncope, No Dizziness  Heme/Lymph:  No Bruising, No Bleeding.          Physical Examination:  T(C): 35.8 (02-11-24 @ 05:19), Max: 36.7 (02-10-24 @ 14:42)  HR: 73 (02-11-24 @ 05:19) (73 - 107)  BP: 132/68 (02-11-24 @ 05:19) (132/68 - 185/82)  RR: 18 (02-11-24 @ 05:19) (18 - 18)  SpO2: 97% (02-10-24 @ 19:22) (97% - 99%)  Height (cm): 177.8 (02-10-24 @ 19:22)  Weight (kg): 85 (02-10-24 @ 19:22)        GENERAL: AAOx3, no acute distress.  HEAD:  Atraumatic, Normocephalic  EYES: conjunctiva and sclera clear  NECK: Supple, no JVD or thyromegaly  CHEST/LUNG: Clear to auscultation bilaterally; No wheeze, rhonchi, or rales  HEART: Regular rate and rhythm; normal S1, S2, No murmurs.  ABDOMEN: Soft, mildly tender in lower abdomen, nondistended; Bowel sounds present  NEUROLOGY: No asterixis or tremor.   SKIN: Intact, no jaundice        Data:                        13.8   16.08 )-----------( 385      ( 10 Feb 2024 15:40 )             38.5     Hgb Trend:  13.8  02-10-24 @ 15:40      02-10    124<L>  |  88<L>  |  7<L>  ----------------------------<  113<H>  4.1   |  27  |  1.0    Ca    9.1      10 Feb 2024 15:40    TPro  6.8  /  Alb  4.0  /  TBili  0.5  /  DBili  x   /  AST  18  /  ALT  19  /  AlkPhos  70  02-10    Liver panel trend:  TBili 0.5   /   AST 18   /   ALT 19   /   AlkP 70   /   Tptn 6.8   /   Alb 4.0    /   DBili --      02-10              Radiology:  CT Abdomen and Pelvis w/ IV Cont:   ACC: [id number] EXAM:  CT ABDOMEN AND PELVIS IC   ORDERED BY: JESSICA LIGHTTIMOYONNY     PROCEDURE DATE:  02/10/2024          INTERPRETATION:  CLINICAL STATEMENT: Abdominal pain.    TECHNIQUE: Contiguous axial CT images were obtained from the lower chest   to the pubic symphysis following administration of intravenous contrast.    95 cc administered of Omnipaque 350 (5 cc discarded).  Oral contrast was   not administered.  Reformatted images in the coronal and sagittal planes   were acquired.    COMPARISON CT: None.    OTHER STUDIES USED FOR CORRELATION: None.      FINDINGS:    LOWER CHEST: Bibasilar streaky linear atelectasis and/or scarring.    HEPATOBILIARY: Unremarkable.    SPLEEN: Unremarkable.    PANCREAS: Unremarkable.    ADRENAL GLANDS: Unremarkable.    KIDNEYS: Section renal enhancement. No hydronephrosis.    ABDOMINOPELVIC NODES: Unremarkable.    PELVIC ORGANS: Unremarkable.    PERITONEUM/MESENTERY/BOWEL: Colonic diverticulosis. Wall thickening with   pericolonic inflammation surrounding the sigmoid colon consistent with   acute diverticulitis. Adjacent to this area there appears to be few   locules of extraluminal air along with fluid and stranding though   difficult to separate from the adjacent small bowel loops (series 301   image 81) which may represent phlegmon/developing abscess however no   discrete walled off drainable collection is identified at this time.   Small amount of free fluid along the right paracolic gutter. No large   gross pneumoperitoneum..    BONES/SOFT TISSUES: Mild degenerative changes.      IMPRESSION:  1.  Acute sigmoid diverticulitis.  2.  Adjacent to the inflamed colon there appears to be few locules of   extraluminal air along with fluid and stranding though difficult to   separate from the adjacent small bowel loops (series 301 image 81) which   may represent phlegmon/developing abscess however no discrete walled off   drainable collection is identified at this time. No gross   pneumoperitoneum. Small amount free fluid along the right paracolic   gutter. If no clinical improvement, short interval follow-up can be   considered to assess for abscess formation.    --- End of Report ---            MARICHUY DE LOS SANTOS MD; Attending Radiologist  This document has been electronically signed. Feb 10 2024  5:24PM (02-10-24 @ 16:29)

## 2024-02-12 ENCOUNTER — TRANSCRIPTION ENCOUNTER (OUTPATIENT)
Age: 54
End: 2024-02-12

## 2024-02-12 VITALS
TEMPERATURE: 99 F | SYSTOLIC BLOOD PRESSURE: 136 MMHG | HEART RATE: 89 BPM | RESPIRATION RATE: 18 BRPM | DIASTOLIC BLOOD PRESSURE: 77 MMHG

## 2024-02-12 LAB
ALBUMIN SERPL ELPH-MCNC: 3.5 G/DL — SIGNIFICANT CHANGE UP (ref 3.5–5.2)
ALP SERPL-CCNC: 59 U/L — SIGNIFICANT CHANGE UP (ref 30–115)
ALT FLD-CCNC: 13 U/L — SIGNIFICANT CHANGE UP (ref 0–41)
ANION GAP SERPL CALC-SCNC: 7 MMOL/L — SIGNIFICANT CHANGE UP (ref 7–14)
AST SERPL-CCNC: 14 U/L — SIGNIFICANT CHANGE UP (ref 0–41)
BASOPHILS # BLD AUTO: 0.03 K/UL — SIGNIFICANT CHANGE UP (ref 0–0.2)
BASOPHILS NFR BLD AUTO: 0.3 % — SIGNIFICANT CHANGE UP (ref 0–1)
BILIRUB SERPL-MCNC: 0.5 MG/DL — SIGNIFICANT CHANGE UP (ref 0.2–1.2)
BUN SERPL-MCNC: 6 MG/DL — LOW (ref 10–20)
CALCIUM SERPL-MCNC: 8.6 MG/DL — SIGNIFICANT CHANGE UP (ref 8.4–10.5)
CHLORIDE SERPL-SCNC: 99 MMOL/L — SIGNIFICANT CHANGE UP (ref 98–110)
CO2 SERPL-SCNC: 28 MMOL/L — SIGNIFICANT CHANGE UP (ref 17–32)
CREAT SERPL-MCNC: 0.8 MG/DL — SIGNIFICANT CHANGE UP (ref 0.7–1.5)
EGFR: 106 ML/MIN/1.73M2 — SIGNIFICANT CHANGE UP
EOSINOPHIL # BLD AUTO: 0.08 K/UL — SIGNIFICANT CHANGE UP (ref 0–0.7)
EOSINOPHIL NFR BLD AUTO: 0.9 % — SIGNIFICANT CHANGE UP (ref 0–8)
GLUCOSE SERPL-MCNC: 116 MG/DL — HIGH (ref 70–99)
HCT VFR BLD CALC: 36.3 % — LOW (ref 42–52)
HGB BLD-MCNC: 12.6 G/DL — LOW (ref 14–18)
IMM GRANULOCYTES NFR BLD AUTO: 0.5 % — HIGH (ref 0.1–0.3)
LYMPHOCYTES # BLD AUTO: 1.04 K/UL — LOW (ref 1.2–3.4)
LYMPHOCYTES # BLD AUTO: 11.1 % — LOW (ref 20.5–51.1)
MCHC RBC-ENTMCNC: 32.3 PG — HIGH (ref 27–31)
MCHC RBC-ENTMCNC: 34.7 G/DL — SIGNIFICANT CHANGE UP (ref 32–37)
MCV RBC AUTO: 93.1 FL — SIGNIFICANT CHANGE UP (ref 80–94)
MONOCYTES # BLD AUTO: 0.78 K/UL — HIGH (ref 0.1–0.6)
MONOCYTES NFR BLD AUTO: 8.4 % — SIGNIFICANT CHANGE UP (ref 1.7–9.3)
NEUTROPHILS # BLD AUTO: 7.36 K/UL — HIGH (ref 1.4–6.5)
NEUTROPHILS NFR BLD AUTO: 78.8 % — HIGH (ref 42.2–75.2)
NRBC # BLD: 0 /100 WBCS — SIGNIFICANT CHANGE UP (ref 0–0)
PLATELET # BLD AUTO: 444 K/UL — HIGH (ref 130–400)
PMV BLD: 8.4 FL — SIGNIFICANT CHANGE UP (ref 7.4–10.4)
POTASSIUM SERPL-MCNC: 4.4 MMOL/L — SIGNIFICANT CHANGE UP (ref 3.5–5)
POTASSIUM SERPL-SCNC: 4.4 MMOL/L — SIGNIFICANT CHANGE UP (ref 3.5–5)
PROT SERPL-MCNC: 6.1 G/DL — SIGNIFICANT CHANGE UP (ref 6–8)
RBC # BLD: 3.9 M/UL — LOW (ref 4.7–6.1)
RBC # FLD: 11.7 % — SIGNIFICANT CHANGE UP (ref 11.5–14.5)
SODIUM SERPL-SCNC: 134 MMOL/L — LOW (ref 135–146)
WBC # BLD: 9.34 K/UL — SIGNIFICANT CHANGE UP (ref 4.8–10.8)
WBC # FLD AUTO: 9.34 K/UL — SIGNIFICANT CHANGE UP (ref 4.8–10.8)

## 2024-02-12 PROCEDURE — 99232 SBSQ HOSP IP/OBS MODERATE 35: CPT

## 2024-02-12 PROCEDURE — 99233 SBSQ HOSP IP/OBS HIGH 50: CPT

## 2024-02-12 PROCEDURE — 99238 HOSP IP/OBS DSCHRG MGMT 30/<: CPT

## 2024-02-12 RX ORDER — OXYCODONE AND ACETAMINOPHEN 5; 325 MG/1; MG/1
1 TABLET ORAL
Qty: 28 | Refills: 0
Start: 2024-02-12 | End: 2024-02-18

## 2024-02-12 RX ORDER — METRONIDAZOLE 500 MG
1 TABLET ORAL
Qty: 42 | Refills: 0
Start: 2024-02-12 | End: 2024-02-25

## 2024-02-12 RX ORDER — ACETAMINOPHEN 500 MG
2 TABLET ORAL
Qty: 0 | Refills: 0 | DISCHARGE
Start: 2024-02-12

## 2024-02-12 RX ORDER — CIPROFLOXACIN LACTATE 400MG/40ML
1 VIAL (ML) INTRAVENOUS
Qty: 28 | Refills: 0
Start: 2024-02-12 | End: 2024-02-25

## 2024-02-12 RX ADMIN — Medication 200 MILLIGRAM(S): at 05:39

## 2024-02-12 RX ADMIN — MORPHINE SULFATE 2 MILLIGRAM(S): 50 CAPSULE, EXTENDED RELEASE ORAL at 02:56

## 2024-02-12 RX ADMIN — MORPHINE SULFATE 2 MILLIGRAM(S): 50 CAPSULE, EXTENDED RELEASE ORAL at 06:13

## 2024-02-12 RX ADMIN — Medication 100 MILLIGRAM(S): at 13:40

## 2024-02-12 RX ADMIN — Medication 100 MILLIGRAM(S): at 05:38

## 2024-02-12 RX ADMIN — Medication 200 MILLIGRAM(S): at 17:04

## 2024-02-12 RX ADMIN — SODIUM CHLORIDE 100 MILLILITER(S): 9 INJECTION, SOLUTION INTRAVENOUS at 05:54

## 2024-02-12 NOTE — PROGRESS NOTE ADULT - SUBJECTIVE AND OBJECTIVE BOX
Patient seen at bedside. No acute complaints at time. No acute events noted overnight.   Reports abdominal pain and bloating significantly improved, had one loose BM over night and + Flatus. Denies fever, chills, n/v.    Vital Signs Last 24 Hrs  T(C): 36.1 (12 Feb 2024 04:52), Max: 36.1 (11 Feb 2024 20:40)  T(F): 97 (12 Feb 2024 04:52), Max: 97 (11 Feb 2024 20:40)  HR: 86 (12 Feb 2024 04:52) (69 - 86)  BP: 128/73 (12 Feb 2024 04:52) (128/73 - 131/71)  BP(mean): --  RR: 18 (12 Feb 2024 04:52) (18 - 18)  SpO2: 97% (11 Feb 2024 20:40) (95% - 97%)    Parameters below as of 11 Feb 2024 20:40  Patient On (Oxygen Delivery Method): room air    General Appearance: NAD  Chest: CTA B/L  CV: S1 S2  Abdomen: Soft, mild distention, no rebound/gaurding, min ttp in lower abd   Extremities: No edema BLE  CNS: A/O x 3    MEDICATIONS  (STANDING):  ciprofloxacin   IVPB 400 milliGRAM(s) IV Intermittent every 12 hours  lactated ringers. 1000 milliLiter(s) (100 mL/Hr) IV Continuous <Continuous>  metroNIDAZOLE  IVPB 500 milliGRAM(s) IV Intermittent every 8 hours    MEDICATIONS  (PRN):  acetaminophen     Tablet .. 650 milliGRAM(s) Oral every 6 hours PRN Temp greater or equal to 38C (100.4F), Mild Pain (1 - 3)  melatonin 3 milliGRAM(s) Oral at bedtime PRN Insomnia  morphine  - Injectable 2 milliGRAM(s) IV Push every 6 hours PRN Severe Pain (7 - 10)  ondansetron Injectable 4 milliGRAM(s) IV Push every 8 hours PRN Nausea and/or Vomiting  oxycodone    5 mG/acetaminophen 325 mG 1 Tablet(s) Oral every 6 hours PRN Moderate Pain (4 - 6)      02-12    134<L>  |  99  |  6<L>  ----------------------------<  116<H>  4.4   |  28  |  0.8    Ca    8.6      12 Feb 2024 06:00  Phos  2.5     02-11  Mg     1.9     02-11    TPro  6.1  /  Alb  3.5  /  TBili  0.5  /  DBili  x   /  AST  14  /  ALT  13  /  AlkPhos  59  02-12                            12.6   9.34  )-----------( 444      ( 12 Feb 2024 06:00 )             36.3     CAPILLARY BLOOD GLUCOSE      RADIOLOGY & ADDITIONAL STUDIES: Reviewed Patient seen at bedside. No acute complaints at time. No acute events noted overnight. Feels much better, a minimal amount of pain in the left lower quadrant. Tolerating clears.  Reports abdominal pain and bloating significantly improved, had one loose BM over night and + Flatus. Denies fever, chills, n/v.    Vital Signs Last 24 Hrs  T(C): 36.1 (12 Feb 2024 04:52), Max: 36.1 (11 Feb 2024 20:40)  T(F): 97 (12 Feb 2024 04:52), Max: 97 (11 Feb 2024 20:40)  HR: 86 (12 Feb 2024 04:52) (69 - 86)  BP: 128/73 (12 Feb 2024 04:52) (128/73 - 131/71)  BP(mean): --  RR: 18 (12 Feb 2024 04:52) (18 - 18)  SpO2: 97% (11 Feb 2024 20:40) (95% - 97%)    Parameters below as of 11 Feb 2024 20:40  Patient On (Oxygen Delivery Method): room air    General Appearance: NAD  Chest: CTA B/L  CV: S1 S2  Abdomen: Soft, mild distention, no rebound/gaurding, min ttp in lower abd   Extremities: No edema BLE  CNS: A/O x 3    MEDICATIONS  (STANDING):  ciprofloxacin   IVPB 400 milliGRAM(s) IV Intermittent every 12 hours  lactated ringers. 1000 milliLiter(s) (100 mL/Hr) IV Continuous <Continuous>  metroNIDAZOLE  IVPB 500 milliGRAM(s) IV Intermittent every 8 hours    MEDICATIONS  (PRN):  acetaminophen     Tablet .. 650 milliGRAM(s) Oral every 6 hours PRN Temp greater or equal to 38C (100.4F), Mild Pain (1 - 3)  melatonin 3 milliGRAM(s) Oral at bedtime PRN Insomnia  morphine  - Injectable 2 milliGRAM(s) IV Push every 6 hours PRN Severe Pain (7 - 10)  ondansetron Injectable 4 milliGRAM(s) IV Push every 8 hours PRN Nausea and/or Vomiting  oxycodone    5 mG/acetaminophen 325 mG 1 Tablet(s) Oral every 6 hours PRN Moderate Pain (4 - 6)      02-12    134<L>  |  99  |  6<L>  ----------------------------<  116<H>  4.4   |  28  |  0.8    Ca    8.6      12 Feb 2024 06:00  Phos  2.5     02-11  Mg     1.9     02-11    TPro  6.1  /  Alb  3.5  /  TBili  0.5  /  DBili  x   /  AST  14  /  ALT  13  /  AlkPhos  59  02-12                            12.6   9.34  )-----------( 444      ( 12 Feb 2024 06:00 )             36.3     CAPILLARY BLOOD GLUCOSE      RADIOLOGY & ADDITIONAL STUDIES: Reviewed

## 2024-02-12 NOTE — DISCHARGE NOTE NURSING/CASE MANAGEMENT/SOCIAL WORK - NSDCPEFALRISK_GEN_ALL_CORE
For information on Fall & Injury Prevention, visit: [email or web address] OR  [email or web address] OR  https://www.cdc.gov/steadi/patient.html

## 2024-02-12 NOTE — PROGRESS NOTE ADULT - REASON FOR ADMISSION
acute complicated diverticulitis

## 2024-02-12 NOTE — PROGRESS NOTE ADULT - NSPROGADDITIONALINFOA_GEN_ALL_CORE
I saw and examined the patient.  Agree with above assessment plan. Please see yesterday's note for details. Recommend n.p.o. versus clears until pain is resolved and then slow diet advance.  Easy to digest low residue after discharge until completion of antibiotics and resumption of normal function after several weeks.
I saw and examined the patient.  Agree with above assessment and plan.  I edited the note as needed.  Thank you

## 2024-02-12 NOTE — DISCHARGE NOTE NURSING/CASE MANAGEMENT/SOCIAL WORK - PATIENT PORTAL LINK FT
You can access the FollowMyHealth Patient Portal offered by [facility] by registering at the following website: [email or web address]. By joining [facility]’s FollowMyHealth portal, you will also be able to view your health information using other applications (apps) compatible with our system.

## 2024-02-12 NOTE — DISCHARGE NOTE PROVIDER - NSDCMRMEDTOKEN_GEN_ALL_CORE_FT
acetaminophen 325 mg oral tablet: 2 tab(s) orally every 6 hours As needed Temp greater or equal to 38C (100.4F), Mild Pain (1 - 3)  Cipro 500 mg oral tablet: 1 tab(s) orally 2 times a day  eszopiclone 3 mg oral tablet: 1 tab(s) orally once a day (at bedtime) as needed for  insomnia  metroNIDAZOLE 500 mg oral tablet: 1 tab(s) orally every 8 hours  oxycodone-acetaminophen 5 mg-325 mg oral tablet: 1 tab(s) orally every 6 hours as needed for Moderate Pain (4 - 6) MDD: 4 tablets

## 2024-02-12 NOTE — DISCHARGE NOTE PROVIDER - CARE PROVIDERS DIRECT ADDRESSES
[email or web address]

## 2024-02-12 NOTE — DISCHARGE NOTE PROVIDER - NSDCCPCAREPLAN_GEN_ALL_CORE_FT
PRINCIPAL DISCHARGE DIAGNOSIS  Diagnosis: Diverticulitis, colon  Assessment and Plan of Treatment: Please finish antibiotics as prescribed.  You will need a repeat CT scan of your abdomen and pelvis as you have scheduled.  Follow with your outpatient doctors and advance your diet slowly.  Return to the hospital for any worsening of symptoms.

## 2024-02-12 NOTE — DISCHARGE NOTE NURSING/CASE MANAGEMENT/SOCIAL WORK - NSTOBACCONEVERSMOKERY/N_GEN_A
Patient saw a comment in her chart regarding low vitamin D and B12. Advised patient those labs have not been drawn by our office and the last time they were done for her were 11/21 by a different provider, and were normal. She verbalized understanding   No

## 2024-02-12 NOTE — PROGRESS NOTE ADULT - SUBJECTIVE AND OBJECTIVE BOX
pt seen and examined.   doing well today w/ no abd pain.        PAST MEDICAL & SURGICAL HISTORY:  Insomnia  Diverticulosis          MEDICATIONS  (STANDING):  ciprofloxacin   IVPB 400 milliGRAM(s) IV Intermittent every 12 hours  lactated ringers. 1000 milliLiter(s) (100 mL/Hr) IV Continuous <Continuous>  metroNIDAZOLE  IVPB 500 milliGRAM(s) IV Intermittent every 8 hours    MEDICATIONS  (PRN):  acetaminophen     Tablet .. 650 milliGRAM(s) Oral every 6 hours PRN Temp greater or equal to 38C (100.4F), Mild Pain (1 - 3)  melatonin 3 milliGRAM(s) Oral at bedtime PRN Insomnia  morphine  - Injectable 2 milliGRAM(s) IV Push every 6 hours PRN Severe Pain (7 - 10)  ondansetron Injectable 4 milliGRAM(s) IV Push every 8 hours PRN Nausea and/or Vomiting  oxycodone    5 mG/acetaminophen 325 mG 1 Tablet(s) Oral every 6 hours PRN Moderate Pain (4 - 6)      Allergies  penicillin (Unknown)      Review of Systems:   Cardiovascular:  No Chest Pain, No Palpitations  Respiratory:  No Cough, No Dyspnea  Gastrointestinal:  As described in HPI    Physical Examination:  T(C): 37 (02-12-24 @ 14:10), Max: 37 (02-12-24 @ 14:10)  HR: 89 (02-12-24 @ 14:10) (86 - 89)  BP: 136/77 (02-12-24 @ 14:10) (128/73 - 136/77)  RR: 18 (02-12-24 @ 14:10) (18 - 18)  SpO2: --      Constitutional: No acute distress.  Respiratory:  No signs of respiratory distress. Lung sounds are clear bilaterally.  Cardiovascular:  S1 S2, Regular rate and rhythm.  Abdominal: Abdomen is soft, symmetric, and without distention. tenderness in LLQ w/ deep palpation.   Skin: No rashes, No Jaundice.  Neuro: AAOx3        Data:                        12.6   9.34  )-----------( 444      ( 12 Feb 2024 06:00 )             36.3     Hgb trend:  12.6  02-12-24 @ 06:00  12.6  02-11-24 @ 11:53  13.8  02-10-24 @ 15:40      02-12    134<L>  |  99  |  6<L>  ----------------------------<  116<H>  4.4   |  28  |  0.8    Ca    8.6      12 Feb 2024 06:00  Phos  2.5     02-11  Mg     1.9     02-11    TPro  6.1  /  Alb  3.5  /  TBili  0.5  /  DBili  x   /  AST  14  /  ALT  13  /  AlkPhos  59  02-12    Liver panel trend:  TBili 0.5   /   AST 14   /   ALT 13   /   AlkP 59   /   Tptn 6.1   /   Alb 3.5    /   DBili --      02-12  TBili 0.5   /   AST 14   /   ALT 14   /   AlkP 59   /   Tptn 5.9   /   Alb 3.5    /   DBili --      02-11  TBili 0.5   /   AST 18   /   ALT 19   /   AlkP 70   /   Tptn 6.8   /   Alb 4.0    /   DBili --      02-10          Culture - Urine (collected 10 Feb 2024 16:30)  Source: Clean Catch Clean Catch (Midstream)  Final Report (11 Feb 2024 22:28):    No growth       Radiology:    < from: CT Abdomen and Pelvis w/ IV Cont (02.10.24 @ 16:29) >  1.  Acute sigmoid diverticulitis.  2.  Adjacent to the inflamed colon there appears to be few locules of   extraluminal air along with fluid and stranding though difficult to   separate from the adjacent small bowel loops (series 301 image 81) which   may represent phlegmon/developing abscess however no discrete walled off   drainable collection is identified at this time. No gross   pneumoperitoneum. Small amount free fluid along the right paracolic   gutter. If no clinical improvement, short interval follow-up can be   considered to assess for abscess formation.    < end of copied text >

## 2024-02-12 NOTE — DISCHARGE NOTE PROVIDER - HOSPITAL COURSE
53 year old male with hx of diverticulosis (1 flare Oct 2023) presenting with 3 days of worsening low abdominal pain associated with non-bloody diarrhea, found to have acute diverticulitis with possible phlegmon/early abscess.  Patient had colonoscopy 4 weeks ago as follow up after resolution of first diverticulitis flare in October 2023 which revealed polyp and diverticulosis.     # acute sigmoid diverticulitis with possible phlegmon  - advanced diet, patient tolerated  -GI, surgery and myself had extensive discussion with patient regarding diet at home, patient verbalized understanding  - IV cipro, flagyl switched to PO as outpatient x 14 days  - surgery consult appreciated  - GI consult appreciated    # hyponatremia, hypochloremia  - IVF LR @ 100, Na improved    # elevated BP likely 2/2 pain    # insomnia  - home med- Lunesta

## 2024-02-12 NOTE — DISCHARGE NOTE PROVIDER - CARE PROVIDER_API CALL
Juan Carlos Rodas  Surgery  [address] [place] [address], [address]  [place] [address]  Phone: [phone]  Fax: [phone]  Follow Up Time:     MARILYNN TORIBIO  [address] [place] [address]  Phone: [phone]  Fax: [phone]  Follow Up Time:

## 2024-02-12 NOTE — PROGRESS NOTE ADULT - ASSESSMENT
Patient is a 54yo m w/ pmhx of diverticulosis presenting w/ complaints of generalized lower abdominal pain since Wednesday. Reports constant pain, 7/10, associated w/ intermittent subjective fever and chills. Also endorses fatigue, severe bloating and non bloody diarrhea for the past dew days. Reports spoke w/ his GI Dr. Velazco two days ago who prescribed him cipro and flagyl but no relief in the sxs prompting ED visit today. Reports last colonoscopy on 01/11124 w/ finding of diverticulosis, last flare up of diverticulitis was back in september last year. Denies n/v, chest pain, sob, melena, hematochezia, urinary sxs.     stable    #Acute sigmoid diverticulitis w/ phlegmon    - Advance diet to FlD, poss d/c in 24 hours   - IV abx   - GI following   - Pain control prn  - GI ppx   - IVF, monitor BMP  - Will continue to follow      Patient seen and above discussed w. Dr. Rodas    Patient is a 54yo m w/ pmhx of diverticulosis presenting w/ complaints of generalized lower abdominal pain since Wednesday. Reports constant pain, 7/10, associated w/ intermittent subjective fever and chills. Also endorses fatigue, severe bloating and non bloody diarrhea for the past dew days. Reports spoke w/ his GI Dr. Velazco two days ago who prescribed him cipro and flagyl but no relief in the sxs prompting ED visit today. Reports last colonoscopy on 01/11124 w/ finding of diverticulosis, last flare up of diverticulitis was back in september last year. Denies n/v, chest pain, sob, melena, hematochezia, urinary sxs.     stable    #Acute sigmoid diverticulitis w/ phlegmon    - Advance diet to FlD, poss d/c in 24 hours if enoc soft solid diet advance  - IV abx   - GI following   - Pain control prn  - GI ppx   - IVF, monitor BMP  - Will continue to follow      Patient seen and above discussed w. Dr. Rodas

## 2024-02-16 ENCOUNTER — INPATIENT (INPATIENT)
Facility: HOSPITAL | Age: 54
LOS: 3 days | Discharge: HOME | DRG: 392 | End: 2024-02-20
Attending: SURGERY | Admitting: SURGERY
Payer: COMMERCIAL

## 2024-02-16 VITALS
DIASTOLIC BLOOD PRESSURE: 87 MMHG | HEIGHT: 70 IN | SYSTOLIC BLOOD PRESSURE: 141 MMHG | TEMPERATURE: 98 F | RESPIRATION RATE: 20 BRPM | OXYGEN SATURATION: 100 % | HEART RATE: 117 BPM | WEIGHT: 199.96 LBS

## 2024-02-16 DIAGNOSIS — K57.20 DIVERTICULITIS OF LARGE INTESTINE WITH PERFORATION AND ABSCESS WITHOUT BLEEDING: ICD-10-CM

## 2024-02-16 PROBLEM — K57.90 DIVERTICULOSIS OF INTESTINE, PART UNSPECIFIED, WITHOUT PERFORATION OR ABSCESS WITHOUT BLEEDING: Chronic | Status: ACTIVE | Noted: 2024-02-10

## 2024-02-16 PROBLEM — G47.00 INSOMNIA, UNSPECIFIED: Chronic | Status: ACTIVE | Noted: 2024-02-10

## 2024-02-16 LAB
ALBUMIN SERPL ELPH-MCNC: 4 G/DL — SIGNIFICANT CHANGE UP (ref 3.5–5.2)
ALP SERPL-CCNC: 65 U/L — SIGNIFICANT CHANGE UP (ref 30–115)
ALT FLD-CCNC: 24 U/L — SIGNIFICANT CHANGE UP (ref 0–41)
ANION GAP SERPL CALC-SCNC: 10 MMOL/L — SIGNIFICANT CHANGE UP (ref 7–14)
APTT BLD: 32.7 SEC — SIGNIFICANT CHANGE UP (ref 27–39.2)
AST SERPL-CCNC: 20 U/L — SIGNIFICANT CHANGE UP (ref 0–41)
BASOPHILS # BLD AUTO: 0.03 K/UL — SIGNIFICANT CHANGE UP (ref 0–0.2)
BASOPHILS NFR BLD AUTO: 0.2 % — SIGNIFICANT CHANGE UP (ref 0–1)
BILIRUB DIRECT SERPL-MCNC: 0.2 MG/DL — SIGNIFICANT CHANGE UP (ref 0–0.3)
BILIRUB INDIRECT FLD-MCNC: 0.5 MG/DL — SIGNIFICANT CHANGE UP (ref 0.2–1.2)
BILIRUB SERPL-MCNC: 0.7 MG/DL — SIGNIFICANT CHANGE UP (ref 0.2–1.2)
BUN SERPL-MCNC: 7 MG/DL — LOW (ref 10–20)
CALCIUM SERPL-MCNC: 9.7 MG/DL — SIGNIFICANT CHANGE UP (ref 8.4–10.5)
CHLORIDE SERPL-SCNC: 92 MMOL/L — LOW (ref 98–110)
CO2 SERPL-SCNC: 28 MMOL/L — SIGNIFICANT CHANGE UP (ref 17–32)
CREAT SERPL-MCNC: 1 MG/DL — SIGNIFICANT CHANGE UP (ref 0.7–1.5)
EGFR: 91 ML/MIN/1.73M2 — SIGNIFICANT CHANGE UP
EOSINOPHIL # BLD AUTO: 0 K/UL — SIGNIFICANT CHANGE UP (ref 0–0.7)
EOSINOPHIL NFR BLD AUTO: 0 % — SIGNIFICANT CHANGE UP (ref 0–8)
GLUCOSE SERPL-MCNC: 134 MG/DL — HIGH (ref 70–99)
HCT VFR BLD CALC: 39.5 % — LOW (ref 42–52)
HGB BLD-MCNC: 14.2 G/DL — SIGNIFICANT CHANGE UP (ref 14–18)
IMM GRANULOCYTES NFR BLD AUTO: 0.5 % — HIGH (ref 0.1–0.3)
INR BLD: 1.53 RATIO — HIGH (ref 0.65–1.3)
LIDOCAIN IGE QN: 24 U/L — SIGNIFICANT CHANGE UP (ref 7–60)
LYMPHOCYTES # BLD AUTO: 1.05 K/UL — LOW (ref 1.2–3.4)
LYMPHOCYTES # BLD AUTO: 8.1 % — LOW (ref 20.5–51.1)
MCHC RBC-ENTMCNC: 32.3 PG — HIGH (ref 27–31)
MCHC RBC-ENTMCNC: 35.9 G/DL — SIGNIFICANT CHANGE UP (ref 32–37)
MCV RBC AUTO: 90 FL — SIGNIFICANT CHANGE UP (ref 80–94)
MONOCYTES # BLD AUTO: 0.85 K/UL — HIGH (ref 0.1–0.6)
MONOCYTES NFR BLD AUTO: 6.5 % — SIGNIFICANT CHANGE UP (ref 1.7–9.3)
NEUTROPHILS # BLD AUTO: 11 K/UL — HIGH (ref 1.4–6.5)
NEUTROPHILS NFR BLD AUTO: 84.7 % — HIGH (ref 42.2–75.2)
NRBC # BLD: 0 /100 WBCS — SIGNIFICANT CHANGE UP (ref 0–0)
PLATELET # BLD AUTO: 597 K/UL — HIGH (ref 130–400)
PMV BLD: 7.8 FL — SIGNIFICANT CHANGE UP (ref 7.4–10.4)
POTASSIUM SERPL-MCNC: 4.4 MMOL/L — SIGNIFICANT CHANGE UP (ref 3.5–5)
POTASSIUM SERPL-SCNC: 4.4 MMOL/L — SIGNIFICANT CHANGE UP (ref 3.5–5)
PROT SERPL-MCNC: 7 G/DL — SIGNIFICANT CHANGE UP (ref 6–8)
PROTHROM AB SERPL-ACNC: 17.5 SEC — HIGH (ref 9.95–12.87)
RBC # BLD: 4.39 M/UL — LOW (ref 4.7–6.1)
RBC # FLD: 11.2 % — LOW (ref 11.5–14.5)
SODIUM SERPL-SCNC: 130 MMOL/L — LOW (ref 135–146)
WBC # BLD: 13 K/UL — HIGH (ref 4.8–10.8)
WBC # FLD AUTO: 13 K/UL — HIGH (ref 4.8–10.8)

## 2024-02-16 PROCEDURE — 99221 1ST HOSP IP/OBS SF/LOW 40: CPT

## 2024-02-16 PROCEDURE — 85730 THROMBOPLASTIN TIME PARTIAL: CPT

## 2024-02-16 PROCEDURE — 74177 CT ABD & PELVIS W/CONTRAST: CPT | Mod: 26,MG

## 2024-02-16 PROCEDURE — 85027 COMPLETE CBC AUTOMATED: CPT

## 2024-02-16 PROCEDURE — 36415 COLL VENOUS BLD VENIPUNCTURE: CPT

## 2024-02-16 PROCEDURE — 99285 EMERGENCY DEPT VISIT HI MDM: CPT

## 2024-02-16 PROCEDURE — G1004: CPT

## 2024-02-16 PROCEDURE — 85025 COMPLETE CBC W/AUTO DIFF WBC: CPT

## 2024-02-16 PROCEDURE — 83735 ASSAY OF MAGNESIUM: CPT

## 2024-02-16 PROCEDURE — 80048 BASIC METABOLIC PNL TOTAL CA: CPT

## 2024-02-16 PROCEDURE — C9113: CPT

## 2024-02-16 PROCEDURE — 85610 PROTHROMBIN TIME: CPT

## 2024-02-16 PROCEDURE — 74177 CT ABD & PELVIS W/CONTRAST: CPT

## 2024-02-16 RX ORDER — ONDANSETRON 8 MG/1
4 TABLET, FILM COATED ORAL ONCE
Refills: 0 | Status: COMPLETED | OUTPATIENT
Start: 2024-02-16 | End: 2024-02-16

## 2024-02-16 RX ORDER — ZOLPIDEM TARTRATE 10 MG/1
5 TABLET ORAL AT BEDTIME
Refills: 0 | Status: DISCONTINUED | OUTPATIENT
Start: 2024-02-16 | End: 2024-02-20

## 2024-02-16 RX ORDER — METRONIDAZOLE 500 MG
500 TABLET ORAL EVERY 8 HOURS
Refills: 0 | Status: DISCONTINUED | OUTPATIENT
Start: 2024-02-16 | End: 2024-02-20

## 2024-02-16 RX ORDER — PANTOPRAZOLE SODIUM 20 MG/1
40 TABLET, DELAYED RELEASE ORAL EVERY 24 HOURS
Refills: 0 | Status: DISCONTINUED | OUTPATIENT
Start: 2024-02-16 | End: 2024-02-20

## 2024-02-16 RX ORDER — ONDANSETRON 8 MG/1
4 TABLET, FILM COATED ORAL EVERY 8 HOURS
Refills: 0 | Status: DISCONTINUED | OUTPATIENT
Start: 2024-02-16 | End: 2024-02-20

## 2024-02-16 RX ORDER — MORPHINE SULFATE 50 MG/1
4 CAPSULE, EXTENDED RELEASE ORAL ONCE
Refills: 0 | Status: DISCONTINUED | OUTPATIENT
Start: 2024-02-16 | End: 2024-02-16

## 2024-02-16 RX ORDER — ENOXAPARIN SODIUM 100 MG/ML
40 INJECTION SUBCUTANEOUS EVERY 24 HOURS
Refills: 0 | Status: DISCONTINUED | OUTPATIENT
Start: 2024-02-16 | End: 2024-02-20

## 2024-02-16 RX ORDER — FAMOTIDINE 10 MG/ML
20 INJECTION INTRAVENOUS ONCE
Refills: 0 | Status: COMPLETED | OUTPATIENT
Start: 2024-02-16 | End: 2024-02-16

## 2024-02-16 RX ORDER — ACETAMINOPHEN 500 MG
650 TABLET ORAL EVERY 6 HOURS
Refills: 0 | Status: DISCONTINUED | OUTPATIENT
Start: 2024-02-16 | End: 2024-02-20

## 2024-02-16 RX ORDER — SODIUM CHLORIDE 9 MG/ML
1000 INJECTION INTRAMUSCULAR; INTRAVENOUS; SUBCUTANEOUS
Refills: 0 | Status: DISCONTINUED | OUTPATIENT
Start: 2024-02-16 | End: 2024-02-19

## 2024-02-16 RX ORDER — SODIUM CHLORIDE 9 MG/ML
2000 INJECTION INTRAMUSCULAR; INTRAVENOUS; SUBCUTANEOUS ONCE
Refills: 0 | Status: COMPLETED | OUTPATIENT
Start: 2024-02-16 | End: 2024-02-16

## 2024-02-16 RX ORDER — METRONIDAZOLE 500 MG
500 TABLET ORAL ONCE
Refills: 0 | Status: COMPLETED | OUTPATIENT
Start: 2024-02-16 | End: 2024-02-16

## 2024-02-16 RX ORDER — CIPROFLOXACIN LACTATE 400MG/40ML
400 VIAL (ML) INTRAVENOUS ONCE
Refills: 0 | Status: COMPLETED | OUTPATIENT
Start: 2024-02-16 | End: 2024-02-16

## 2024-02-16 RX ORDER — CIPROFLOXACIN LACTATE 400MG/40ML
400 VIAL (ML) INTRAVENOUS EVERY 12 HOURS
Refills: 0 | Status: DISCONTINUED | OUTPATIENT
Start: 2024-02-16 | End: 2024-02-16

## 2024-02-16 RX ORDER — MORPHINE SULFATE 50 MG/1
2 CAPSULE, EXTENDED RELEASE ORAL EVERY 4 HOURS
Refills: 0 | Status: DISCONTINUED | OUTPATIENT
Start: 2024-02-16 | End: 2024-02-20

## 2024-02-16 RX ORDER — ONDANSETRON 8 MG/1
1 TABLET, FILM COATED ORAL
Qty: 1 | Refills: 0
Start: 2024-02-16 | End: 2024-02-20

## 2024-02-16 RX ADMIN — MORPHINE SULFATE 2 MILLIGRAM(S): 50 CAPSULE, EXTENDED RELEASE ORAL at 20:10

## 2024-02-16 RX ADMIN — PANTOPRAZOLE SODIUM 40 MILLIGRAM(S): 20 TABLET, DELAYED RELEASE ORAL at 18:19

## 2024-02-16 RX ADMIN — ONDANSETRON 4 MILLIGRAM(S): 8 TABLET, FILM COATED ORAL at 10:22

## 2024-02-16 RX ADMIN — SODIUM CHLORIDE 125 MILLILITER(S): 9 INJECTION INTRAMUSCULAR; INTRAVENOUS; SUBCUTANEOUS at 16:34

## 2024-02-16 RX ADMIN — Medication 100 MILLIGRAM(S): at 11:26

## 2024-02-16 RX ADMIN — FAMOTIDINE 20 MILLIGRAM(S): 10 INJECTION INTRAVENOUS at 10:22

## 2024-02-16 RX ADMIN — ENOXAPARIN SODIUM 40 MILLIGRAM(S): 100 INJECTION SUBCUTANEOUS at 21:55

## 2024-02-16 RX ADMIN — Medication 200 MILLIGRAM(S): at 12:07

## 2024-02-16 RX ADMIN — MORPHINE SULFATE 2 MILLIGRAM(S): 50 CAPSULE, EXTENDED RELEASE ORAL at 20:22

## 2024-02-16 RX ADMIN — Medication 100 MILLIGRAM(S): at 21:54

## 2024-02-16 RX ADMIN — SODIUM CHLORIDE 2000 MILLILITER(S): 9 INJECTION INTRAMUSCULAR; INTRAVENOUS; SUBCUTANEOUS at 10:21

## 2024-02-16 RX ADMIN — MORPHINE SULFATE 4 MILLIGRAM(S): 50 CAPSULE, EXTENDED RELEASE ORAL at 15:25

## 2024-02-16 NOTE — H&P ADULT - NSHPPHYSICALEXAM_GEN_ALL_CORE
Vital Signs Last 24 Hrs  T(C): 36.9 (16 Feb 2024 15:32), Max: 36.9 (16 Feb 2024 15:32)  T(F): 98.5 (16 Feb 2024 15:32), Max: 98.5 (16 Feb 2024 15:32)  HR: 96 (16 Feb 2024 15:32) (96 - 117)  RR: 20 (16 Feb 2024 15:32) (20 - 20)  SpO2: 100% (16 Feb 2024 15:32) (100% - 100%)    Parameters below as of 16 Feb 2024 15:32  Patient On (Oxygen Delivery Method): room air      GENERAL:  52y/o Male NAD  HEAD:  Atraumatic, Normocephalic  EYES: EOMI, conjunctiva and sclera clear  CHEST/LUNG: Clear to auscultation bilaterally; No wheeze, rhonchi, or rales  HEART: S1&S2  ABDOMEN: Soft, LUQ/LLQ/Epigastric ttp, ND, +BS, no signs of peritonitis/guarding/rebound tenderness  EXTREMITIES:   No clubbing, no cyanosis, or no edema, no calf tenderness  PSYCH: AAOx3, cooperative, appropriate

## 2024-02-16 NOTE — ED ADULT TRIAGE NOTE - CHIEF COMPLAINT QUOTE
admitted during the weekend for diverticulosis on oral antibiotic discharged monday; "im dehydrated"

## 2024-02-16 NOTE — ED ADULT NURSE NOTE - HISTORY OF COVID-19 VACCINATION
[FreeTextEntry1] : Is a 89 has numbness in the legs weakness in the legs and difficulty walking such as paresthesias in both legs feeling a cephalexin giving out she feels numbness in both lower extremities tingling in paresthesias to the point where she uses a cane and feels as though she's conformal she denies any bowel or any bladder difficulties. He has no history of any head trauma she denies any problems referable to that of her upper extremities. Vaccine status unknown

## 2024-02-16 NOTE — H&P ADULT - HISTORY OF PRESENT ILLNESS
Patient is a 54yo m w/ psxhx of hernia repair, pmhx of diverticulosis, recently admitted for acute diverticulitis and discharged on 02/12 on oral abx presenting back w/ complaints of nausea since yesterday around 1500 w/ loss of apetitie and two episodes of NBNB vomiting. Reports intermittent left sided generalzied  abdominal pain 2-7/10. Last BM this am w/ diarrhea, two normal BMs yesterday, Reports + Flatus. Also endorses intermittent episodes of chills and sweating.   Last colonoscopy on 01/11/24 w/ finding of diverticulosis  Denies fever, chest pain, sob, urinary sxs.

## 2024-02-16 NOTE — ED PROVIDER NOTE - OBJECTIVE STATEMENT
Patient is a 53-year-old male recently discharged from the hospital after being admitted for acute diverticulitis with phlegmonous change here for evaluation of nausea since being discharged.  Patient had 2 episodes of vomiting this morning which prompted visit.  Patient states abdominal pain has improved overall and is currently on oral antibiotics.  Patient denies dysuria, hematuria, fever, chills

## 2024-02-16 NOTE — PATIENT PROFILE ADULT - FALL HARM RISK - UNIVERSAL INTERVENTIONS
Bed in lowest position, wheels locked, appropriate side rails in place/Call bell, personal items and telephone in reach/Instruct patient to call for assistance before getting out of bed or chair/Non-slip footwear when patient is out of bed/[place] to call system/Physically safe environment - no spills, clutter or unnecessary equipment/Purposeful Proactive Rounding/Room/bathroom lighting operational, light cord in reach

## 2024-02-16 NOTE — H&P ADULT - NSHPLABSRESULTS_GEN_ALL_CORE
14.2   13.00 )-----------( 597      ( 16 Feb 2024 10:00 )             39.5       02-16    130<L>  |  92<L>  |  7<L>  ----------------------------<  134<H>  4.4   |  28  |  1.0    Ca    9.7      16 Feb 2024 10:00    TPro  7.0  /  Alb  4.0  /  TBili  0.7  /  DBili  0.2  /  AST  20  /  ALT  24  /  AlkPhos  65  02-16              Urinalysis Basic - ( 16 Feb 2024 10:00 )    Color: x / Appearance: x / SG: x / pH: x  Gluc: 134 mg/dL / Ketone: x  / Bili: x / Urobili: x   Blood: x / Protein: x / Nitrite: x   Leuk Esterase: x / RBC: x / WBC x   Sq Epi: x / Non Sq Epi: x / Bacteria: x          CAPILLARY BLOOD GLUCOSE        Culture Results:   No growth (02-10 @ 16:30)    < from: CT Abdomen and Pelvis w/ IV Cont (02.16.24 @ 13:46) >  IMPRESSION:  Since2/10/2024,  6.3 x 4.4 cm bilocular pericolonic abscess seen abutting the inflamed   sigmoid colon.  Increased free fluid in the right lower quadrant.  Proximal small bowel dilation with air-fluid levels may be secondary to   reactive ileus or developing obstruction due to mass effect from the   abscess. Recommend interval follow-up with oral contrast.  --- End of Report ---

## 2024-02-16 NOTE — ED PROVIDER NOTE - CLINICAL SUMMARY MEDICAL DECISION MAKING FREE TEXT BOX
In my opinion, in patient treatment is medically justifiable and appropriate.  See attending note for documentation of medical decision making. Patient requires admission for n.p.o., IV antibiotics, likely operative intervention.

## 2024-02-16 NOTE — H&P ADULT - NSHPADDITIONALINFOADULT_GEN_ALL_CORE
I saw and examined the patient. I  agree with above assessment plan. I I have reviewed the labs and the CT findings at length. He has persistent abscess with diverticulitis which is increased in size to about a 6cm dumbell shaped lesion which is located posteriorly.  IR is not able to drain it without a window.  I did contact the advanced GI service who may be able to coordinate an ultrasound guided colonoscopic stent/drain in approximately a week's time.  I will have the patient admitted and treated with Levaquin 750 mg IV daily and Flagyl 500 mg IV every 6 hours.  Clears in AM if no N/V. I will plan to repeat the scan in a few days and likely plan for outpatient IV antibiotics as he improves.

## 2024-02-16 NOTE — H&P ADULT - ASSESSMENT
Patient is a 54yo m w/ psxhx of hernia repair, pmhx of diverticulosis, recently admitted for acute diverticulitis and discharged on 02/12 on oral abx presenting back w/ complaints of nausea since yesterday around 1500 w/ loss of apetitie and two episodes of NBNB vomiting. Reports intermittent left sided generalzied  abdominal pain 2-7/10. Last BM this am w/ diarrhea, two normal BMs yesterday, Reports + Flatus. Also endorses intermittent episodes of chills and sweating.   Last colonoscopy on 01/11/24 w/ finding of diverticulosis  Denies fever, chest pain, sob, urinary sxs.     #Acute Diverticulitis  #Pericolonic abscess  - WBC 13, , Afebrile  - CT AP: 6.3 x 4.4 cm bilocular pericolonic abscess seen abutting the inflamed   sigmoid colon.  Increased free fluid in the right lower quadrant.  Proximal small bowel dilation with air-fluid levels may be secondary to   reactive ileus or developing obstruction due to mass effect from the   abscess. Recommend interval follow-up with oral contrast.  - Admit to surgery  - NPO/IVF  - IV abx - Zosyn   - GI ppx   - Pain control prn  - Zofran prn for nausea   - Trend WBC, Monitor for fevers   - IR Consult     Above discussed w/ Dr. Rodas  Patient is a 54yo m w/ psxhx of hernia repair, pmhx of diverticulosis, recently admitted for acute diverticulitis and discharged on 02/12 on oral abx presenting back w/ complaints of nausea since yesterday around 1500 w/ loss of apetitie and two episodes of NBNB vomiting. Reports intermittent left sided generalzied  abdominal pain 2-7/10. Last BM this am w/ diarrhea, two normal BMs yesterday, Reports + Flatus. Also endorses intermittent episodes of chills and sweating.   Last colonoscopy on 01/11/24 w/ finding of diverticulosis  Denies fever, chest pain, sob, urinary sxs.     #Acute Diverticulitis  #Pericolonic abscess  - WBC 13, , Afebrile  - CT AP: 6.3 x 4.4 cm bilocular pericolonic abscess seen abutting the inflamed   sigmoid colon.  Increased free fluid in the right lower quadrant.  Proximal small bowel dilation with air-fluid levels may be secondary to   reactive ileus or developing obstruction due to mass effect from the   abscess. Recommend interval follow-up with oral contrast.  - Admit to surgery  - NPO/IVF  - IV abx - Zosyn   - GI ppx   - Pain control prn  - Zofran prn for nausea   - Trend WBC, Monitor for fevers   - IR Consult     Diet: NPO   Activity: AAT  VTE PPX: Lovenox  GI PPX: PPI  Dispo: acute, IR, clinical improvement         Above discussed w/ Dr. Rodas  Patient is a 52yo m w/ psxhx of hernia repair, pmhx of diverticulosis, recently admitted for acute diverticulitis and discharged on 02/12 on oral abx presenting back w/ complaints of nausea since yesterday around 1500 w/ loss of apetitie and two episodes of NBNB vomiting. Reports intermittent left sided generalzied  abdominal pain 2-7/10. Last BM this am w/ diarrhea, two normal BMs yesterday, Reports + Flatus. Also endorses intermittent episodes of chills and sweating.   Last colonoscopy on 01/11/24 w/ finding of diverticulosis  Denies fever, chest pain, sob, urinary sxs.     #Acute Diverticulitis  #Pericolonic abscess  - WBC 13, , Afebrile  - CT AP: 6.3 x 4.4 cm bilocular pericolonic abscess seen abutting the inflamed   sigmoid colon.  Increased free fluid in the right lower quadrant.  Proximal small bowel dilation with air-fluid levels may be secondary to   reactive ileus or developing obstruction due to mass effect from the   abscess. Recommend interval follow-up with oral contrast.  - Admit to surgery  - NPO/IVF  - IV abx - Cipro/Flagyl    - GI ppx   - Pain control prn  - Zofran prn for nausea   - Trend WBC, Monitor for fevers   - IR Consult     Diet: NPO   Activity: AAT  VTE PPX: Lovenox  GI PPX: PPI  Dispo: acute, IR, clinical improvement         Above discussed w/ Dr. Rodas

## 2024-02-16 NOTE — ED PROVIDER NOTE - ATTENDING APP SHARED VISIT CONTRIBUTION OF CARE
Patient complains of lower abdominal pain.  Is associated with nausea, vomiting and occasional diarrhea.  He was recently admitted for diverticulitis and treated with IV antibiotics.  CT at that time suggested early abscess formation.  He clinically improved and was discharged home on oral antibiotics.  Today he reports bloating.  Vital signs noted.  There is tenderness over the sigmoid colon with mild guarding.  Repeat CT shows abscess has formed.  Surgery consulted.  Patient will be admitted for IV antibiotics and in anticipation of operative intervention.

## 2024-02-16 NOTE — ED ADULT NURSE NOTE - NSFALLUNIVINTERV_ED_ALL_ED
Bed/Stretcher in lowest position, wheels locked, appropriate side rails in place/Call bell, personal items and telephone in reach/Instruct patient to call for assistance before getting out of bed/chair/stretcher/Non-slip footwear applied when patient is off stretcher/[place] to call system/Physically safe environment - no spills, clutter or unnecessary equipment/Purposeful proactive rounding/Room/bathroom lighting operational, light cord in reach

## 2024-02-16 NOTE — ED PROVIDER NOTE - INPATIENT RECORD SUMMARY
Details of prior admission for diverticulitis reviewed.  CT at that time showed "early abscess formation".  Today CT compared to the CT done at that admission.  Preliminarily this seems to be a significant change and possibly consistent with abscess.

## 2024-02-17 LAB
ANION GAP SERPL CALC-SCNC: 10 MMOL/L — SIGNIFICANT CHANGE UP (ref 7–14)
BUN SERPL-MCNC: 6 MG/DL — LOW (ref 10–20)
CALCIUM SERPL-MCNC: 8.7 MG/DL — SIGNIFICANT CHANGE UP (ref 8.4–10.5)
CHLORIDE SERPL-SCNC: 98 MMOL/L — SIGNIFICANT CHANGE UP (ref 98–110)
CO2 SERPL-SCNC: 25 MMOL/L — SIGNIFICANT CHANGE UP (ref 17–32)
CREAT SERPL-MCNC: 0.7 MG/DL — SIGNIFICANT CHANGE UP (ref 0.7–1.5)
EGFR: 111 ML/MIN/1.73M2 — SIGNIFICANT CHANGE UP
GLUCOSE SERPL-MCNC: 99 MG/DL — SIGNIFICANT CHANGE UP (ref 70–99)
HCT VFR BLD CALC: 36.3 % — LOW (ref 42–52)
HGB BLD-MCNC: 13.1 G/DL — LOW (ref 14–18)
MAGNESIUM SERPL-MCNC: 1.9 MG/DL — SIGNIFICANT CHANGE UP (ref 1.8–2.4)
MCHC RBC-ENTMCNC: 32.5 PG — HIGH (ref 27–31)
MCHC RBC-ENTMCNC: 36.1 G/DL — SIGNIFICANT CHANGE UP (ref 32–37)
MCV RBC AUTO: 90.1 FL — SIGNIFICANT CHANGE UP (ref 80–94)
NRBC # BLD: 0 /100 WBCS — SIGNIFICANT CHANGE UP (ref 0–0)
PLATELET # BLD AUTO: 581 K/UL — HIGH (ref 130–400)
PMV BLD: 8.2 FL — SIGNIFICANT CHANGE UP (ref 7.4–10.4)
POTASSIUM SERPL-MCNC: 4.6 MMOL/L — SIGNIFICANT CHANGE UP (ref 3.5–5)
POTASSIUM SERPL-SCNC: 4.6 MMOL/L — SIGNIFICANT CHANGE UP (ref 3.5–5)
RBC # BLD: 4.03 M/UL — LOW (ref 4.7–6.1)
RBC # FLD: 11.3 % — LOW (ref 11.5–14.5)
SODIUM SERPL-SCNC: 133 MMOL/L — LOW (ref 135–146)
WBC # BLD: 10.87 K/UL — HIGH (ref 4.8–10.8)
WBC # FLD AUTO: 10.87 K/UL — HIGH (ref 4.8–10.8)

## 2024-02-17 PROCEDURE — 99232 SBSQ HOSP IP/OBS MODERATE 35: CPT

## 2024-02-17 RX ADMIN — Medication 650 MILLIGRAM(S): at 21:26

## 2024-02-17 RX ADMIN — ZOLPIDEM TARTRATE 5 MILLIGRAM(S): 10 TABLET ORAL at 02:36

## 2024-02-17 RX ADMIN — Medication 650 MILLIGRAM(S): at 22:07

## 2024-02-17 RX ADMIN — Medication 100 MILLIGRAM(S): at 05:33

## 2024-02-17 RX ADMIN — SODIUM CHLORIDE 125 MILLILITER(S): 9 INJECTION INTRAMUSCULAR; INTRAVENOUS; SUBCUTANEOUS at 00:30

## 2024-02-17 RX ADMIN — Medication 100 MILLIGRAM(S): at 21:26

## 2024-02-17 RX ADMIN — Medication 100 MILLIGRAM(S): at 15:03

## 2024-02-17 RX ADMIN — SODIUM CHLORIDE 125 MILLILITER(S): 9 INJECTION INTRAMUSCULAR; INTRAVENOUS; SUBCUTANEOUS at 10:18

## 2024-02-17 RX ADMIN — MORPHINE SULFATE 2 MILLIGRAM(S): 50 CAPSULE, EXTENDED RELEASE ORAL at 22:56

## 2024-02-17 RX ADMIN — MORPHINE SULFATE 2 MILLIGRAM(S): 50 CAPSULE, EXTENDED RELEASE ORAL at 01:00

## 2024-02-17 RX ADMIN — PANTOPRAZOLE SODIUM 40 MILLIGRAM(S): 20 TABLET, DELAYED RELEASE ORAL at 17:52

## 2024-02-17 NOTE — PROGRESS NOTE ADULT - SUBJECTIVE AND OBJECTIVE BOX
GENERAL SURGERY PROGRESS NOTE     LAVERNE ALMANZA  53y  Male  Hospital day :1d  POD:  Procedure:   OVERNIGHT EVENTS: No acute events overnight. Patient state is feeling better. Afebrile,  Hemodynamically stable, breathing on room air. NPO , denies any new episode of nausea, vomiting or diarrhea, had a bowel movement yesterday overnight, patient state abdominal pain is well controlled. Voiding spontaneously. IR was consulted , but not window for IR procedure. Continue on IV antibiotics , WBC downtrending.     T(F): 97.2 (02-17-24 @ 04:37), Max: 98.5 (02-16-24 @ 15:32)  HR: 92 (02-17-24 @ 04:37) (63 - 97)  BP: 136/77 (02-17-24 @ 04:37) (100/65 - 156/74)  ABP: --  ABP(mean): --  RR: 18 (02-17-24 @ 04:37) (18 - 20)  SpO2: 95% (02-17-24 @ 04:37) (93% - 100%)    DIET/FLUIDS: sodium chloride 0.9%. 1000 milliLiter(s) IV Continuous <Continuous>    NG:                                                                              DRAINS:   BM:   EMESIS:   URINE:    GI proph:  pantoprazole  Injectable 40 milliGRAM(s) IV Push every 24 hours    AC/ proph: enoxaparin Injectable 40 milliGRAM(s) SubCutaneous every 24 hours    ABx: levoFLOXacin IVPB 750 milliGRAM(s) IV Intermittent every 24 hours  metroNIDAZOLE  IVPB 500 milliGRAM(s) IV Intermittent every 8 hours      PHYSICAL EXAM:  GENERAL: NAD, well-appearing  CHEST/LUNG: Clear to auscultation bilaterally  HEART: Regular rate and rhythm  ABDOMEN: Soft, Nontender, Nondistended;   EXTREMITIES:  No clubbing, cyanosis, or edema      LABS  Labs:  CAPILLARY BLOOD GLUCOSE                              13.1   10.87 )-----------( 581      ( 17 Feb 2024 08:42 )             36.3         02-17    133<L>  |  98  |  6<L>  ----------------------------<  99  4.6   |  25  |  0.7      Calcium: 8.7 mg/dL (02-17-24 @ 08:42)      LFTs:             7.0  | 0.7  | 20       ------------------[65      ( 16 Feb 2024 10:00 )  4.0  | 0.2  | 24          Lipase:24     Amylase:x             Coags:     17.50  ----< 1.53    ( 16 Feb 2024 19:09 )     32.7                Urinalysis Basic - ( 17 Feb 2024 08:42 )    Color: x / Appearance: x / SG: x / pH: x  Gluc: 99 mg/dL / Ketone: x  / Bili: x / Urobili: x   Blood: x / Protein: x / Nitrite: x   Leuk Esterase: x / RBC: x / WBC x   Sq Epi: x / Non Sq Epi: x / Bacteria: x           GENERAL SURGERY PROGRESS NOTE     LAVERNE ALMANZA  53y  Male  Hospital day :1d  POD:  Procedure:   OVERNIGHT EVENTS: No acute events overnight. Patient state is feeling better. Afebrile,  Hemodynamically stable, breathing on room air. NPO , denies any new episode of nausea, vomiting or diarrhea, had a bowel movement yesterday overnight, patient state abdominal pain is well controlled. Voiding spontaneously. IR was consulted , but not window for IR procedure. Continue on IV antibiotics , WBC downtrending.     T(F): 97.2 (02-17-24 @ 04:37), Max: 98.5 (02-16-24 @ 15:32)  HR: 92 (02-17-24 @ 04:37) (63 - 97)  BP: 136/77 (02-17-24 @ 04:37) (100/65 - 156/74)  ABP: --  ABP(mean): --  RR: 18 (02-17-24 @ 04:37) (18 - 20)  SpO2: 95% (02-17-24 @ 04:37) (93% - 100%)    DIET/FLUIDS: sodium chloride 0.9%. 1000 milliLiter(s) IV Continuous <Continuous>    NG:                                                                              DRAINS:   BM:   EMESIS:   URINE:    GI proph:  pantoprazole  Injectable 40 milliGRAM(s) IV Push every 24 hours    AC/ proph: enoxaparin Injectable 40 milliGRAM(s) SubCutaneous every 24 hours    ABx: levoFLOXacin IVPB 750 milliGRAM(s) IV Intermittent every 24 hours  metroNIDAZOLE  IVPB 500 milliGRAM(s) IV Intermittent every 8 hours      PHYSICAL EXAM:  GENERAL: NAD, well-appearing  CHEST/LUNG: Clear to auscultation bilaterally  HEART: Regular rate and rhythm  ABDOMEN: Soft, mildly tenderness lower abdomen, Nondistended;   EXTREMITIES:  No clubbing, cyanosis, or edema      LABS  Labs:  CAPILLARY BLOOD GLUCOSE                              13.1   10.87 )-----------( 581      ( 17 Feb 2024 08:42 )             36.3         02-17    133<L>  |  98  |  6<L>  ----------------------------<  99  4.6   |  25  |  0.7      Calcium: 8.7 mg/dL (02-17-24 @ 08:42)      LFTs:             7.0  | 0.7  | 20       ------------------[65      ( 16 Feb 2024 10:00 )  4.0  | 0.2  | 24          Lipase:24     Amylase:x             Coags:     17.50  ----< 1.53    ( 16 Feb 2024 19:09 )     32.7                Urinalysis Basic - ( 17 Feb 2024 08:42 )    Color: x / Appearance: x / SG: x / pH: x  Gluc: 99 mg/dL / Ketone: x  / Bili: x / Urobili: x   Blood: x / Protein: x / Nitrite: x   Leuk Esterase: x / RBC: x / WBC x   Sq Epi: x / Non Sq Epi: x / Bacteria: x

## 2024-02-17 NOTE — CONSULT NOTE ADULT - SUBJECTIVE AND OBJECTIVE BOX
INTERVENTIONAL RADIOLOGY CONSULT:     Procedure Requested: Pericolonic abscess drainage    HPI:  Patient is a 52yo m w/ psxhx of hernia repair, pmhx of diverticulosis, recently admitted for acute diverticulitis and discharged on 02/12 on oral abx presenting back w/ complaints of nausea since yesterday around 1500 w/ loss of apetitie and two episodes of NBNB vomiting. Reports intermittent left sided generalzied  abdominal pain 2-7/10. Last BM this am w/ diarrhea, two normal BMs yesterday, Reports + Flatus. Also endorses intermittent episodes of chills and sweating.   Last colonoscopy on 01/11/24 w/ finding of diverticulosis  Denies fever, chest pain, sob, urinary sxs.    (16 Feb 2024 15:35)      PAST MEDICAL & SURGICAL HISTORY:  Insomnia      Diverticulosis          MEDICATIONS  (STANDING):  ciprofloxacin   IVPB 400 milliGRAM(s) IV Intermittent every 12 hours  enoxaparin Injectable 40 milliGRAM(s) SubCutaneous every 24 hours  metroNIDAZOLE  IVPB 500 milliGRAM(s) IV Intermittent every 8 hours  pantoprazole  Injectable 40 milliGRAM(s) IV Push every 24 hours  sodium chloride 0.9%. 1000 milliLiter(s) (125 mL/Hr) IV Continuous <Continuous>    MEDICATIONS  (PRN):  acetaminophen     Tablet .. 650 milliGRAM(s) Oral every 6 hours PRN Temp greater or equal to 38C (100.4F), Mild Pain (1 - 3)  aluminum hydroxide/magnesium hydroxide/simethicone Suspension 30 milliLiter(s) Oral every 4 hours PRN Dyspepsia  morphine  - Injectable 2 milliGRAM(s) IV Push every 4 hours PRN Moderate Pain (4 - 6)  ondansetron Injectable 4 milliGRAM(s) IV Push every 8 hours PRN Nausea and/or Vomiting  zolpidem 5 milliGRAM(s) Oral at bedtime PRN Insomnia      Allergies    penicillin (Unknown)    Intolerances          FAMILY HISTORY:      Physical Exam:   Vital Signs Last 24 Hrs  T(C): 35.7 (16 Feb 2024 16:37), Max: 36.9 (16 Feb 2024 15:32)  T(F): 96.3 (16 Feb 2024 16:37), Max: 98.5 (16 Feb 2024 15:32)  HR: 97 (16 Feb 2024 16:37) (96 - 117)  BP: 156/74 (16 Feb 2024 16:37) (141/87 - 156/74)  BP(mean): --  RR: 18 (16 Feb 2024 16:37) (18 - 20)  SpO2: 97% (16 Feb 2024 16:37) (97% - 100%)    Parameters below as of 16 Feb 2024 16:37  Patient On (Oxygen Delivery Method): room air          Labs:                         14.2   13.00 )-----------( 597      ( 16 Feb 2024 10:00 )             39.5     02-16    130<L>  |  92<L>  |  7<L>  ----------------------------<  134<H>  4.4   |  28  |  1.0    Ca    9.7      16 Feb 2024 10:00    TPro  7.0  /  Alb  4.0  /  TBili  0.7  /  DBili  0.2  /  AST  20  /  ALT  24  /  AlkPhos  65  02-16        Pertinent labs:                      14.2   13.00 )-----------( 597      ( 16 Feb 2024 10:00 )             39.5       02-16    130<L>  |  92<L>  |  7<L>  ----------------------------<  134<H>  4.4   |  28  |  1.0    Ca    9.7      16 Feb 2024 10:00    TPro  7.0  /  Alb  4.0  /  TBili  0.7  /  DBili  0.2  /  AST  20  /  ALT  24  /  AlkPhos  65  02-16          Radiology & Additional Studies:     Radiology imaging reviewed.       ASSESSMENT AND PLAN:    52yo m w/ psxhx of hernia repair, pmhx of diverticulosis, recently admitted for acute diverticulitis and discharged on 02/12 on oral abx presenting back w/ complaints of nausea, loss of appetite, NBNB vomiting and intermittent left sided generalized abdominal pain found to have a pericolonic abscess. IR consulted for abscess drainage.       -No percutaneous window for drainage of abscess  -No acute IR intervention at this time.  -Discussed with primary team.     Thank you for the courtesy of this consult, please call [phone] with any further questions.   
CAMILAVERNE  53y, Male  Allergies    penicillin (Unknown)    Intolerances    LOS  1d    HPI  HPI:  Patient is a 52yo m w/ psxhx of hernia repair, pmhx of diverticulosis, recently admitted for acute diverticulitis and discharged on 02/12 on oral abx presenting back w/ complaints of nausea since yesterday around 1500 w/ loss of apetitie and two episodes of NBNB vomiting. Reports intermittent left sided generalzied  abdominal pain 2-7/10. Last BM this am w/ diarrhea, two normal BMs yesterday, Reports + Flatus. Also endorses intermittent episodes of chills and sweating.   Last colonoscopy on 01/11/24 w/ finding of diverticulosis  Denies fever, chest pain, sob, urinary sxs.    (16 Feb 2024 15:35)      INFECTIOUS DISEASE HISTORY:  Hospital course-  ID consulted for antimicrobial recommendations.     Prior hospital charts reviewed [Yes]  Primary team notes reviewed [Yes]  Other consultant notes reviewed [Yes]    REVIEW OF SYSTEMS:  CONSTITUTIONAL: No fever or chills  HEENT: No sore throat  RESPIRATORY: No cough, no shortness of breath  CARDIOVASCULAR: No chest pain or palpitations  GASTROINTESTINAL: No abdominal or epigastric pain  GENITOURINARY: No dysuria  NEUROLOGICAL: No headache/dizziness  MSK: No joint pain, erythema, or swelling; no back pain  SKIN: No itching, rashes  All other ROS negative except noted above    PAST MEDICAL & SURGICAL HISTORY:  Insomnia      Diverticulosis    SOCIAL HISTORY:  - No recent travel    FAMILY HISTORY: No pertinent PMH for first degree relatives.     ANTIMICROBIALS:  levoFLOXacin IVPB 750 every 24 hours  metroNIDAZOLE  IVPB 500 every 8 hours      ANTIMICROBIALS (past 90 days):  MEDICATIONS  (STANDING):    ciprofloxacin   IVPB   200 mL/Hr IV Intermittent (02-16-24 @ 12:07)    levoFLOXacin IVPB   100 mL/Hr IV Intermittent (02-17-24 @ 11:29)    metroNIDAZOLE  IVPB   100 mL/Hr IV Intermittent (02-16-24 @ 11:26)    metroNIDAZOLE  IVPB   100 mL/Hr IV Intermittent (02-17-24 @ 15:03)   100 mL/Hr IV Intermittent (02-17-24 @ 05:33)   100 mL/Hr IV Intermittent (02-16-24 @ 21:54)    OTHER MEDS:   MEDICATIONS  (STANDING):  acetaminophen     Tablet .. 650 every 6 hours PRN  aluminum hydroxide/magnesium hydroxide/simethicone Suspension 30 every 4 hours PRN  enoxaparin Injectable 40 every 24 hours  morphine  - Injectable 2 every 4 hours PRN  ondansetron Injectable 4 every 8 hours PRN  pantoprazole  Injectable 40 every 24 hours  zolpidem 5 at bedtime PRN      VITALS:  Vital Signs Last 24 Hrs  T(F): 98 (02-17-24 @ 14:12), Max: 98.6 (02-12-24 @ 14:10)    Vital Signs Last 24 Hrs  HR: 79 (02-17-24 @ 14:12) (63 - 97)  BP: 132/72 (02-17-24 @ 14:12) (100/65 - 156/74)  RR: 18 (02-17-24 @ 14:12)  SpO2: 95% (02-17-24 @ 04:37) (93% - 97%)  Wt(kg): --    EXAM:  GENERAL: NAD, lying in bed  HEAD: No head lesions  NECK: Supple, nontender to palpation; no JVD  CHEST/LUNG: Clear to auscultation bilaterally  HEART: S1 S2  ABDOMEN: Soft, nontender  EXTREMITIES: No clubbing, cyanosis, or petal edema  NERVOUS SYSTEM: Alert and oriented to person, time, place and situation  MSK: No joint erythema, swelling or pain  SKIN: No rashes or lesions, no superficial thrombophlebitis    Labs:                        13.1   10.87 )-----------( 581      ( 17 Feb 2024 08:42 )             36.3     02-17    133<L>  |  98  |  6<L>  ----------------------------<  99  4.6   |  25  |  0.7    Ca    8.7      17 Feb 2024 08:42  Mg     1.9     02-17    TPro  7.0  /  Alb  4.0  /  TBili  0.7  /  DBili  0.2  /  AST  20  /  ALT  24  /  AlkPhos  65  02-16      WBC Trend:  WBC Count: 10.87 (02-17-24 @ 08:42)  WBC Count: 13.00 (02-16-24 @ 10:00)      Auto Neutrophil #: 11.00 K/uL (02-16-24 @ 10:00)  Auto Neutrophil #: 7.36 K/uL (02-12-24 @ 06:00)  Auto Neutrophil #: 9.10 K/uL (02-11-24 @ 11:53)  Auto Neutrophil #: 13.49 K/uL (02-10-24 @ 15:40)      Creatine Trend:  Creatinine: 0.7 (02-17)  Creatinine: 1.0 (02-16)  Creatinine: 0.8 (02-12)  Creatinine: 0.8 (02-11)      Liver Biochemical Testing Trend:  Alanine Aminotransferase (ALT/SGPT): 24 (02-16)  Alanine Aminotransferase (ALT/SGPT): 13 (02-12)  Alanine Aminotransferase (ALT/SGPT): 14 (02-11)  Alanine Aminotransferase (ALT/SGPT): 19 (02-10)  Aspartate Aminotransferase (AST/SGOT): 20 (02-16-24 @ 10:00)  Aspartate Aminotransferase (AST/SGOT): 14 (02-12-24 @ 06:00)  Aspartate Aminotransferase (AST/SGOT): 14 (02-11-24 @ 11:53)  Aspartate Aminotransferase (AST/SGOT): 18 (02-10-24 @ 15:40)  Bilirubin Direct: 0.2 (02-16)  Bilirubin Total: 0.7 (02-16)  Bilirubin Total: 0.5 (02-12)  Bilirubin Total: 0.5 (02-11)  Bilirubin Total: 0.5 (02-10)      Trend LDH      Auto Eosinophil %: 0.0 % (02-16-24 @ 10:00)      Urinalysis Basic - ( 17 Feb 2024 08:42 )    Color: x / Appearance: x / SG: x / pH: x  Gluc: 99 mg/dL / Ketone: x  / Bili: x / Urobili: x   Blood: x / Protein: x / Nitrite: x   Leuk Esterase: x / RBC: x / WBC x   Sq Epi: x / Non Sq Epi: x / Bacteria: x        MICROBIOLOGY:    Male    INFLAMMATORY MARKERS      RADIOLOGY & ADDITIONAL TESTS:  I have personally reviewed the imagings.  CXR      CT  CT Abdomen and Pelvis w/ IV Cont:   ACC: [id number] EXAM:  CT ABDOMEN AND PELVIS IC   ORDERED BY: MARILIN LOPEZ     PROCEDURE DATE:  02/16/2024          INTERPRETATION:  CLINICAL STATEMENT: Diverticulitis    TECHNIQUE: Contiguous axial CT images were obtained from the lower chest  to the pubic symphysis following administration of intravenous contrast.    95 cc administered of Omnipaque 350 (5 cc discarded).  Oral contrast was   not administered.  Reformatted images in the coronal and sagittal planes   were acquired.    COMPARISON CT: 2/10/2024    OTHER STUDIES USED FOR CORRELATION: None.      FINDINGS:    LOWER CHEST: Unchanged.    HEPATOBILIARY: Unremarkable.    SPLEEN: Unremarkable.    PANCREAS: Unremarkable.    ADRENAL GLANDS: Unremarkable.    KIDNEYS: Unremarkable.    ABDOMINOPELVIC NODES: Unremarkable.    PELVIC ORGANS: Unremarkable.    PERITONEUM/MESENTERY/BOWEL: 6.3 x 4.4 cm bilocular abscess with more   well-defined margins since 6 days earlier seen abutting the inflamed   sigmoid colon.. Increased free fluid in the right lower quadrant.   Proximal small bowel dilation with air-fluid levels may be secondary to   reactive ileus or developing obstruction due to mass effect from the   abscess.    BONES/SOFT TISSUES: Unremarkable.        IMPRESSION:    Since2/10/2024,    6.3 x 4.4 cm bilocular pericolonic abscess seen abutting the inflamed   sigmoid colon.    Increased free fluid in the right lower quadrant.    Proximal small bowel dilation with air-fluid levels may be secondary to   reactive ileus or developing obstruction due to mass effect from the   abscess. Recommend interval follow-up with oral contrast.    --- End of Report ---    NIDHI GOULD MD; Attending Radiologist  This document has been electronically signed. Feb 16 2024  2:40PM (02-16-24 @ 13:46)      CARDIOLOGY TESTING  12 Lead ECG:   Ventricular Rate 85 BPM    Atrial Rate 85 BPM    P-R Interval 140 ms    QRS Duration 82 ms    Q-T Interval 360 ms    QTC Calculation(Bazett) 428 ms    P Axis 66 degrees    R Axis 89 degrees    T Axis 25 degrees    Diagnosis Line Normal sinus rhythm  Nonspecific T wave abnormality  Abnormal ECG    Confirmed by KATHY DOYLE MD ([id number]) on 2/12/2024 7:13:44 AM (02-11-24 @ 08:39)  12 Lead ECG:   Ventricular Rate 94 BPM    Atrial Rate 94 BPM    P-R Interval 140 ms    QRS Duration 82 ms    Q-T Interval 338 ms    QTC Calculation(Bazett) 422 ms    P Axis 54 degrees    R Axis 99 degrees    T Axis -8 degrees    Diagnosis Line Normal sinus rhythm  Rightward axis  T wave abnormality, consider inferior ischemia  Abnormal ECG    Confirmed by KAYLENE PIÑA MD ([id number]) on 2/11/2024 9:43:36 AM (02-10-24 @ 18:43)

## 2024-02-17 NOTE — CONSULT NOTE ADULT - ASSESSMENT
ASSESSMENT  This is a 53 year old male with PMH and PSH of hernia repair and diverticulosis, recently admitted for acute diverticulitis and discharged on 02/12 on oral abx presenting back w/ complaints of nausea.  IMPRESSION  #Intra-abdominal abscess- Sigmoidal colon.  #Diverticulitis   #History of hernia repair.  #Obesity BMI (kg/m2): 29.1, 26.9  Colonoscopy in 1/2024 in [place] revealing 5mm ascending colon polyp and left sided diverticulosis (report reviewed)    RECOMMENDATIONS  -Reviewed surgical and IR recommendations- No window for drainage.  -Ideally the abscess needs drainage as IV or PO antibiotics are not the optimal treatment and may fail.  -Can consider IV ertapenem 1 gram 24 hours via midline for 3 weeks from initiation. (aware of penicillin allergy, should be okay to use)  -Need to have repeat CT abdomen in 2 weeks to ensure the abscess is decreasing otherwise, surgical intervention may need to be considered again.  -Dietary precautions as per the GI and primary team.  - Weekly CBC, CMP, ESR/CRP  - ID follow-up with Dr. Rickie Watson for Telehealth. We will call the patient between 10:30-1:30      [id number]        [phone]       Fax [phone]    If any questions, please send a message or call on [employer] Teams  Please continue to update ID with any pertinent new laboratory or radiographic findings.    Jessie Peñaloza M.D  Infectious Diseases Attending/   [employer] at [facility]

## 2024-02-18 PROCEDURE — 99231 SBSQ HOSP IP/OBS SF/LOW 25: CPT

## 2024-02-18 RX ADMIN — Medication 100 MILLIGRAM(S): at 06:19

## 2024-02-18 RX ADMIN — Medication 100 MILLIGRAM(S): at 14:09

## 2024-02-18 RX ADMIN — MORPHINE SULFATE 2 MILLIGRAM(S): 50 CAPSULE, EXTENDED RELEASE ORAL at 21:52

## 2024-02-18 RX ADMIN — Medication 100 MILLIGRAM(S): at 21:54

## 2024-02-18 RX ADMIN — MORPHINE SULFATE 2 MILLIGRAM(S): 50 CAPSULE, EXTENDED RELEASE ORAL at 23:53

## 2024-02-18 RX ADMIN — ZOLPIDEM TARTRATE 5 MILLIGRAM(S): 10 TABLET ORAL at 02:53

## 2024-02-18 RX ADMIN — SODIUM CHLORIDE 125 MILLILITER(S): 9 INJECTION INTRAMUSCULAR; INTRAVENOUS; SUBCUTANEOUS at 18:34

## 2024-02-18 RX ADMIN — PANTOPRAZOLE SODIUM 40 MILLIGRAM(S): 20 TABLET, DELAYED RELEASE ORAL at 18:00

## 2024-02-18 NOTE — PROGRESS NOTE ADULT - ATTENDING COMMENTS
Pt seen and examined on am rounds with surgical PA. ID maria guadalupe noted.  He feels better overall, tolerates po fluids, cont's to have bowel fn.  Abd soft and not distended, much less lower abd tenderness, no rebound or guarding, no palpable masses  Labs noted.  Good progress overall, await repeat CT.,  A&P as noted above.
Pt seen examined on am rounds with surgical resident and PA, labs and imaging reviewed.  alert and stable, not anorexic, + bowel fn, voids well.  Abd soft and not distended or diffusely tender.  Some residual lower abd tenderness is much improved and w/o rebound.  No plan for surgical Rx at this time.  Will likely go home on longer-term IV axbx (awaitng ID eval), after repeat CT documents adequte initial response to current presentation.  Long discussion re: management decisions, attempt to avoid early intervention and a likely colostomy, but that he likely should have an elective interval sigmoidectomy to avoid future episodes and their sequelae, after re-eval as outpt by Surgery and GI. All questions answered and he understands and agrees.  Surgery will cont to follow as inpt.

## 2024-02-18 NOTE — PROGRESS NOTE ADULT - SUBJECTIVE AND OBJECTIVE BOX
.  Patient seen & examined with Dr. Grace.  No acute events noted overnight.  Patient reports symptoms improving and tolerated clear liquids without N/V or increased pain.   Patient reports (+) Flatus, (+) BM's.    Patient denies subjective fever, chills, tremors, N/V/D, CP or SOB.           MEDICATIONS  (STANDING):  enoxaparin Injectable 40 milliGRAM(s) SubCutaneous every 24 hours  levoFLOXacin IVPB 750 milliGRAM(s) IV Intermittent every 24 hours  metroNIDAZOLE  IVPB 500 milliGRAM(s) IV Intermittent every 8 hours  pantoprazole  Injectable 40 milliGRAM(s) IV Push every 24 hours  sodium chloride 0.9%. 1000 milliLiter(s) (125 mL/Hr) IV Continuous <Continuous>    MEDICATIONS  (PRN):  acetaminophen     Tablet .. 650 milliGRAM(s) Oral every 6 hours PRN Temp greater or equal to 38C (100.4F), Mild Pain (1 - 3)  aluminum hydroxide/magnesium hydroxide/simethicone Suspension 30 milliLiter(s) Oral every 4 hours PRN Dyspepsia  morphine  - Injectable 2 milliGRAM(s) IV Push every 4 hours PRN Moderate Pain (4 - 6)  ondansetron Injectable 4 milliGRAM(s) IV Push every 8 hours PRN Nausea and/or Vomiting  zolpidem 5 milliGRAM(s) Oral at bedtime PRN Insomnia        Vital Signs Last 24 Hrs  T(C): 36.6 (18 Feb 2024 05:26), Max: 36.7 (17 Feb 2024 14:12)  T(F): 97.8 (18 Feb 2024 05:26), Max: 98 (17 Feb 2024 14:12)  HR: 80 (18 Feb 2024 05:26) (79 - 88)  BP: 138/76 (18 Feb 2024 05:26) (132/72 - 150/73)  RR: 18 (18 Feb 2024 05:26) (18 - 18)          Physical Exam:  General:  WD, WN, male conversant in NAD.   Neck:  Supple, No JVD.  Chest:  Clear to auscultation bilaterally, Equal expansion bilaterally, equal breath sounds, No W/R/R.  CV:  S1 & S2, RRR, No M/R/G.   Abdomen:  + Bowel sounds, soft, no distention.  (+) Lower abdominal tenderness with palpation,  no rebound/guarding or peritoneal signs.    Extremities:  No C/C/E,  No calf tenderness B/L.    Neuro: AxAxOx4, no focal deficits.           LABS:                        13.1   10.87 )-----------( 581      ( 17 Feb 2024 08:42 )             36.3     02-17    133<L>  |  98  |  6<L>  ----------------------------<  99  4.6   |  25  |  0.7    Ca    8.7      17 Feb 2024 08:42  Mg     1.9     02-17      PT/INR - ( 16 Feb 2024 19:09 )   PT: 17.50 sec;   INR: 1.53 ratio    PTT - ( 16 Feb 2024 19:09 )  PTT:32.7 sec      Urinalysis Basic - ( 17 Feb 2024 08:42 )    Color: x / Appearance: x / SG: x / pH: x  Gluc: 99 mg/dL / Ketone: x  / Bili: x / Urobili: x   Blood: x / Protein: x / Nitrite: x   Leuk Esterase: x / RBC: x / WBC x   Sq Epi: x / Non Sq Epi: x / Bacteria: x  .

## 2024-02-19 PROCEDURE — 99232 SBSQ HOSP IP/OBS MODERATE 35: CPT

## 2024-02-19 PROCEDURE — ZZZZZ: CPT

## 2024-02-19 PROCEDURE — 74177 CT ABD & PELVIS W/CONTRAST: CPT | Mod: 26

## 2024-02-19 RX ORDER — IOHEXOL 300 MG/ML
30 INJECTION, SOLUTION INTRAVENOUS ONCE
Refills: 0 | Status: COMPLETED | OUTPATIENT
Start: 2024-02-19 | End: 2024-02-19

## 2024-02-19 RX ADMIN — Medication 100 MILLIGRAM(S): at 05:29

## 2024-02-19 RX ADMIN — Medication 100 MILLIGRAM(S): at 21:42

## 2024-02-19 RX ADMIN — ZOLPIDEM TARTRATE 5 MILLIGRAM(S): 10 TABLET ORAL at 02:48

## 2024-02-19 RX ADMIN — IOHEXOL 30 MILLILITER(S): 300 INJECTION, SOLUTION INTRAVENOUS at 12:50

## 2024-02-19 RX ADMIN — Medication 100 MILLIGRAM(S): at 13:25

## 2024-02-19 RX ADMIN — ONDANSETRON 4 MILLIGRAM(S): 8 TABLET, FILM COATED ORAL at 21:48

## 2024-02-19 NOTE — PROGRESS NOTE ADULT - SUBJECTIVE AND OBJECTIVE BOX
Patient seen at bedside. No acute complaints at time. No acute events overnight.   Reports no abdominal pain at time. + Flatus, +BMs. Tolerating CLD well. Denies n/v, fevers.     Vital Signs Last 24 Hrs  T(C): 35.6 (19 Feb 2024 05:04), Max: 36.7 (18 Feb 2024 12:43)  T(F): 96 (19 Feb 2024 05:04), Max: 98 (18 Feb 2024 12:43)  HR: 63 (19 Feb 2024 05:04) (61 - 79)  BP: 143/86 (19 Feb 2024 05:04) (139/78 - 164/92)  RR: 18 (19 Feb 2024 05:04) (18 - 18)    General Appearance: NAD  Chest: CTA B/L  CV: S1 S2  Abdomen: Soft, NT/ND,+bs,  no rebound/gaurding  Extremities: No edema BLE  CNS: A/O x 3    MEDICATIONS  (STANDING):  enoxaparin Injectable 40 milliGRAM(s) SubCutaneous every 24 hours  iohexol 300 mG (iodine)/mL Oral Solution 30 milliLiter(s) Oral once  levoFLOXacin IVPB 750 milliGRAM(s) IV Intermittent every 24 hours  metroNIDAZOLE  IVPB 500 milliGRAM(s) IV Intermittent every 8 hours  pantoprazole  Injectable 40 milliGRAM(s) IV Push every 24 hours  sodium chloride 0.9%. 1000 milliLiter(s) (125 mL/Hr) IV Continuous <Continuous>    MEDICATIONS  (PRN):  acetaminophen     Tablet .. 650 milliGRAM(s) Oral every 6 hours PRN Temp greater or equal to 38C (100.4F), Mild Pain (1 - 3)  aluminum hydroxide/magnesium hydroxide/simethicone Suspension 30 milliLiter(s) Oral every 4 hours PRN Dyspepsia  morphine  - Injectable 2 milliGRAM(s) IV Push every 4 hours PRN Moderate Pain (4 - 6)  ondansetron Injectable 4 milliGRAM(s) IV Push every 8 hours PRN Nausea and/or Vomiting  zolpidem 5 milliGRAM(s) Oral at bedtime PRN Insomnia      RADIOLOGY & ADDITIONAL STUDIES: Reviewed

## 2024-02-19 NOTE — PROGRESS NOTE ADULT - NSPROGADDITIONALINFOA_GEN_ALL_CORE
I have seen and examined the patient.  Agree with above assessment plan.  I will repeat the CT with IV and oral contrast today and DC planning for tomorrow with IV ertapenem.  As an outpatient the patient may warrant intervention by GI and I will clarify that after the CT is performed and as an outpatient in conjunction with the advanced GI team.

## 2024-02-20 ENCOUNTER — TRANSCRIPTION ENCOUNTER (OUTPATIENT)
Age: 54
End: 2024-02-20

## 2024-02-20 VITALS
DIASTOLIC BLOOD PRESSURE: 86 MMHG | SYSTOLIC BLOOD PRESSURE: 156 MMHG | HEART RATE: 70 BPM | RESPIRATION RATE: 18 BRPM | TEMPERATURE: 96 F

## 2024-02-20 LAB
ANION GAP SERPL CALC-SCNC: 9 MMOL/L — SIGNIFICANT CHANGE UP (ref 7–14)
BASOPHILS # BLD AUTO: 0.04 K/UL — SIGNIFICANT CHANGE UP (ref 0–0.2)
BASOPHILS NFR BLD AUTO: 0.8 % — SIGNIFICANT CHANGE UP (ref 0–1)
BUN SERPL-MCNC: 7 MG/DL — LOW (ref 10–20)
CALCIUM SERPL-MCNC: 9.1 MG/DL — SIGNIFICANT CHANGE UP (ref 8.4–10.5)
CHLORIDE SERPL-SCNC: 98 MMOL/L — SIGNIFICANT CHANGE UP (ref 98–110)
CO2 SERPL-SCNC: 27 MMOL/L — SIGNIFICANT CHANGE UP (ref 17–32)
CREAT SERPL-MCNC: 0.9 MG/DL — SIGNIFICANT CHANGE UP (ref 0.7–1.5)
EGFR: 103 ML/MIN/1.73M2 — SIGNIFICANT CHANGE UP
EOSINOPHIL # BLD AUTO: 0.12 K/UL — SIGNIFICANT CHANGE UP (ref 0–0.7)
EOSINOPHIL NFR BLD AUTO: 2.3 % — SIGNIFICANT CHANGE UP (ref 0–8)
GLUCOSE SERPL-MCNC: 96 MG/DL — SIGNIFICANT CHANGE UP (ref 70–99)
HCT VFR BLD CALC: 37.3 % — LOW (ref 42–52)
HGB BLD-MCNC: 13 G/DL — LOW (ref 14–18)
IMM GRANULOCYTES NFR BLD AUTO: 0.8 % — HIGH (ref 0.1–0.3)
LYMPHOCYTES # BLD AUTO: 1.4 K/UL — SIGNIFICANT CHANGE UP (ref 1.2–3.4)
LYMPHOCYTES # BLD AUTO: 26.4 % — SIGNIFICANT CHANGE UP (ref 20.5–51.1)
MAGNESIUM SERPL-MCNC: 2.2 MG/DL — SIGNIFICANT CHANGE UP (ref 1.8–2.4)
MCHC RBC-ENTMCNC: 31.9 PG — HIGH (ref 27–31)
MCHC RBC-ENTMCNC: 34.9 G/DL — SIGNIFICANT CHANGE UP (ref 32–37)
MCV RBC AUTO: 91.6 FL — SIGNIFICANT CHANGE UP (ref 80–94)
MONOCYTES # BLD AUTO: 0.59 K/UL — SIGNIFICANT CHANGE UP (ref 0.1–0.6)
MONOCYTES NFR BLD AUTO: 11.1 % — HIGH (ref 1.7–9.3)
NEUTROPHILS # BLD AUTO: 3.12 K/UL — SIGNIFICANT CHANGE UP (ref 1.4–6.5)
NEUTROPHILS NFR BLD AUTO: 58.6 % — SIGNIFICANT CHANGE UP (ref 42.2–75.2)
NRBC # BLD: 0 /100 WBCS — SIGNIFICANT CHANGE UP (ref 0–0)
PLATELET # BLD AUTO: 580 K/UL — HIGH (ref 130–400)
PMV BLD: 7.9 FL — SIGNIFICANT CHANGE UP (ref 7.4–10.4)
POTASSIUM SERPL-MCNC: 4.3 MMOL/L — SIGNIFICANT CHANGE UP (ref 3.5–5)
POTASSIUM SERPL-SCNC: 4.3 MMOL/L — SIGNIFICANT CHANGE UP (ref 3.5–5)
RBC # BLD: 4.07 M/UL — LOW (ref 4.7–6.1)
RBC # FLD: 11.3 % — LOW (ref 11.5–14.5)
SODIUM SERPL-SCNC: 134 MMOL/L — LOW (ref 135–146)
WBC # BLD: 5.31 K/UL — SIGNIFICANT CHANGE UP (ref 4.8–10.8)
WBC # FLD AUTO: 5.31 K/UL — SIGNIFICANT CHANGE UP (ref 4.8–10.8)

## 2024-02-20 PROCEDURE — 99239 HOSP IP/OBS DSCHRG MGMT >30: CPT

## 2024-02-20 RX ORDER — ERTAPENEM SODIUM 1 G/1
1000 INJECTION, POWDER, LYOPHILIZED, FOR SOLUTION INTRAMUSCULAR; INTRAVENOUS ONCE
Refills: 0 | Status: COMPLETED | OUTPATIENT
Start: 2024-02-20 | End: 2024-02-20

## 2024-02-20 RX ORDER — ERTAPENEM SODIUM 1 G/1
1 INJECTION, POWDER, LYOPHILIZED, FOR SOLUTION INTRAMUSCULAR; INTRAVENOUS
Qty: 0 | Refills: 0 | DISCHARGE
Start: 2024-02-20

## 2024-02-20 RX ORDER — PANTOPRAZOLE SODIUM 20 MG/1
40 TABLET, DELAYED RELEASE ORAL
Refills: 0 | Status: DISCONTINUED | OUTPATIENT
Start: 2024-02-20 | End: 2024-02-20

## 2024-02-20 RX ADMIN — MORPHINE SULFATE 2 MILLIGRAM(S): 50 CAPSULE, EXTENDED RELEASE ORAL at 01:27

## 2024-02-20 RX ADMIN — ZOLPIDEM TARTRATE 5 MILLIGRAM(S): 10 TABLET ORAL at 02:20

## 2024-02-20 RX ADMIN — Medication 100 MILLIGRAM(S): at 05:11

## 2024-02-20 RX ADMIN — ERTAPENEM SODIUM 120 MILLIGRAM(S): 1 INJECTION, POWDER, LYOPHILIZED, FOR SOLUTION INTRAMUSCULAR; INTRAVENOUS at 15:06

## 2024-02-20 NOTE — DISCHARGE NOTE PROVIDER - NSDCMRMEDTOKEN_GEN_ALL_CORE_FT
eszopiclone 3 mg oral tablet: 1 tab(s) orally once a day (at bedtime) as needed for  insomnia   ertapenem 1 g injection: 1 gram(s) intravenous once a day for 3 weeks  eszopiclone 3 mg oral tablet: 1 tab(s) orally once a day (at bedtime) as needed for  insomnia

## 2024-02-20 NOTE — PROGRESS NOTE ADULT - ASSESSMENT
Impression:  Patient is a 53 year old male now with complicated acute diverticulitis associated with abscess.  IR consulted and unable to drain secondary to no window for IR procedure.         Plan:  # Complicated acute diverticulitis associated with abscess.   - Clear liquids.  Patient tolerating well.  Will keep on clear liquids until results of his CT scan tomorrow.   - IV Hydration:  Will decrease his IVF's to 50 mL/hr.   - IV Antibiotics:  Levofloxacin 750 mg q 24 hours and Flagyl 500 mg IV q 8 hours.      - ID following and recommends:     1. Can consider IV Ertapenem 1 gram 24 hours via midline for 3 weeks from initiation. (aware of penicillin allergy, should be okay to use)     2. Needs repeat CT abdomen in 2 weeks to ensure the abscess is decreasing otherwise, surgical intervention may need to be considered again.     3. Weekly CBC, CMP, ESR/CRP.     4. ID follow-up with Dr. Rickie Watson for Telehealth.  We will call the patient between 10:30-1:30.    - Pain control as needed with Tylenol and Morphine.    - Nausea control with Zofran PRN.  - Incentive Spirometry 10 times every hour while awake.  - Encourage OOB to ambulation.  - Seen, examined and discussed with Dr. Grace this morning and recommends to decrease his IVF's to 50 mL/hr since tolerating clear liquids and recommends to repeat CT scan of abdomen & pelvis tomorrow to assess diverticulitis with abscess.          # GI prophylaxis:  - Protonix daily.  - Zofran PRN N/V.        # VTE prophylaxis:  - Lovenox 40 mg SQ daily.  - Intermittent compression devices to bilateral legs while in bed.   - Leg exercises and OOB to chair and ambulate with assistance. 
This is a 53 year old male with PMH and PSH of hernia repair and diverticulosis, recently admitted for acute diverticulitis and discharged on 02/12 on oral abx presenting back w/ complaints of nausea.  IMPRESSION  #Intra-abdominal abscess- Sigmoidal colon.  #Diverticulitis   #History of hernia repair.  #Obesity BMI (kg/m2): 29.1, 26.9  Colonoscopy in 1/2024 in [place] revealing 5mm ascending colon polyp and left sided diverticulosis (report reviewed)    RECOMMENDATIONS  -Noted CT abdomen with decreasing size of the collections.   -Ideally the abscess needs drainage as IV or PO antibiotics are not the optimal treatment and may fail.  - IV ertapenem 1 gram 24 hours via midline for 3 weeks from initiation. (aware of penicillin allergy, should be okay to use)  -Need to have repeat CT abdomen in 2 weeks to ensure the abscess is decreasing otherwise, surgical intervention may need to be considered again.  -Dietary precautions as per the GI and primary team.  - Weekly CBC, CMP, ESR/CRP  - ID follow-up with Dr. Rickie Watson for Telehealth. We will call the patient between 10:30-1:30      [id number]        [phone]       Fax [phone]  -Recall ID as needed    If any questions, please send a message or call on [employer] Teams  Please continue to update ID with any pertinent new laboratory or radiographic findings.    Jessie Peñaloza M.D  Infectious Diseases Attending/   [employer] at [facility]  
Patient is a 53 year old male now with complicated acute diverticulitis associated with abscess. IR consulted, not window for IR procedure.     #Acute Diverticulitis  #Pericolonic abscess  - Advance diet to CLD  - Serial abdominal examination  - Monitor vitals  - Continue IV abx - Levofloxacin/Flagyl (Start date 2/16) D2    - GI ppx   - Pain control prn  - Zofran prn for nausea   - DVT ppx: Lovenox  - Trend WBC, Monitor for fevers   -IR consulted: No percutaneous window for drainage of abscess  -ID consult to evaluate possible IV antibiotics on discharge.       Above discussed w/ Dr. Grace
Patient is a 53 year old male now with complicated acute diverticulitis associated with abscess.  IR consulted and unable to drain secondary to no window for IR procedure.     Plan:  # Complicated acute diverticulitis associated with abscess  - Repeat Abd/Pelvis w/ oral and IV contrast   - Tolerating CLD well, will advance to soft later today   - IV abx: Levofloxacin 750 mg q 24 hours and Flagyl 500 mg IV q 8 hours  - ID consult appreciated:     1. IV Ertapenem 1 gram 24 hours via midline for 3 weeks from initiation. (aware of penicillin allergy, should be okay to use)     2. Needs repeat CT abdomen in 2 weeks to ensure the abscess is decreasing otherwise, surgical intervention may need to be considered again.     3. Weekly CBC, CMP, ESR/CRP.     4. ID follow-up with Dr. Rickie Watson for Telehealth.  We will call the patient between 10:30-1:30.  - Pain control prn    - Nausea control with Zofran PRN.  - Encourage OOB to ambulation.    Diet: CLD  Activity: OOB/AAT  VTE PPX: Lovenox   GI PPX: PPI  Dispo: FU repeat CT, likely dc tomorrow w/ IV abx and outpatient FU     Patient seen and above discussed w/ Dr. Rodas

## 2024-02-20 NOTE — DISCHARGE NOTE PROVIDER - NSDCFUADDINST_GEN_ALL_CORE_FT
Per Infectious Disease:  -Recommend IV ertapenem 1 gram 24 hours via midline for 3 weeks from initiation (1st dose given 2/20/24)  -Need to have repeat CT abdomen in 2 weeks to ensure the abscess is decreasing otherwise, surgical intervention may need to be considered again.  -Dietary precautions as per the GI and primary team.  - Weekly CBC, CMP, ESR/CRP  - ID follow-up with Dr. Rickie Watson for Telehealth. We will call the patient between 10:30-1:30      [id number]       [phone]       Fax [phone]

## 2024-02-20 NOTE — DISCHARGE NOTE PROVIDER - PROVIDER TOKENS
PROVIDER:[TOKEN:[[id number]],FOLLOWUP:[1 week]] PROVIDER:[TOKEN:[[id number]],FOLLOWUP:[2 weeks]]

## 2024-02-20 NOTE — PROGRESS NOTE ADULT - SUBJECTIVE AND OBJECTIVE BOX
CANDE ALMANZAIAN  53y, Male    LOS  4d    INTERVAL EVENTS/HPI  - No acute events overnight  - T(F): , Max: 97 (02-20-24 @ 05:00)  - Denies any worsening symptoms  - Tolerating medication  - WBC Count: 5.31 (02-20-24 @ 07:45)  - Creatinine: 0.9 (02-20-24 @ 07:45)    REVIEW OF SYSTEMS:  CONSTITUTIONAL: No fever or chills  HEENT: No sore throat  RESPIRATORY: No cough, no shortness of breath  CARDIOVASCULAR: No chest pain or palpitations  GASTROINTESTINAL: No abdominal or epigastric pain  GENITOURINARY: No dysuria  NEUROLOGICAL: No headache/dizziness  MSK: No joint pain, erythema, or swelling; no back pain  SKIN: No itching, rashes  All other ROS negative except noted above    Prior hospital charts reviewed [Yes]  Primary team notes reviewed [Yes]  Other consultant notes reviewed [Yes]    ANTIMICROBIALS:   levoFLOXacin IVPB 750 every 24 hours  metroNIDAZOLE  IVPB 500 every 8 hours      OTHER MEDS: MEDICATIONS  (STANDING):  acetaminophen     Tablet .. 650 every 6 hours PRN  aluminum hydroxide/magnesium hydroxide/simethicone Suspension 30 every 4 hours PRN  enoxaparin Injectable 40 every 24 hours  morphine  - Injectable 2 every 4 hours PRN  ondansetron Injectable 4 every 8 hours PRN  pantoprazole  Injectable 40 every 24 hours  zolpidem 5 at bedtime PRN      Vital Signs Last 24 Hrs  T(F): 97 (02-20-24 @ 05:00), Max: 98.5 (02-16-24 @ 15:32)    Vital Signs Last 24 Hrs  HR: 62 (02-20-24 @ 05:00) (62 - 77)  BP: 125/75 (02-20-24 @ 05:00) (124/71 - 140/83)  RR: 18 (02-20-24 @ 05:00)  SpO2: 96% (02-20-24 @ 05:00) (96% - 96%)  Wt(kg): --    EXAM:  GENERAL: NAD, lying in bed  HEAD: No head lesions  NECK: Supple, nontender to palpation; no JVD  CHEST/LUNG: Clear to auscultation bilaterally  HEART: S1 S2  ABDOMEN: Soft, nontender  EXTREMITIES: No clubbing, cyanosis, or petal edema  NERVOUS SYSTEM: Alert and oriented to person, time, place and situation  MSK: No joint erythema, swelling or pain  SKIN: No rashes or lesions, no superficial thrombophlebitis    Labs:                        13.0   5.31  )-----------( 580      ( 20 Feb 2024 07:45 )             37.3     02-20    134<L>  |  98  |  7<L>  ----------------------------<  96  4.3   |  27  |  0.9    Ca    9.1      20 Feb 2024 07:45  Mg     2.2     02-20        WBC Trend:  WBC Count: 5.31 (02-20-24 @ 07:45)  WBC Count: 10.87 (02-17-24 @ 08:42)  WBC Count: 13.00 (02-16-24 @ 10:00)      Creatine Trend:  Creatinine: 0.9 (02-20)  Creatinine: 0.7 (02-17)  Creatinine: 1.0 (02-16)      Liver Biochemical Testing Trend:  Alanine Aminotransferase (ALT/SGPT): 24 (02-16)  Alanine Aminotransferase (ALT/SGPT): 13 (02-12)  Alanine Aminotransferase (ALT/SGPT): 14 (02-11)  Alanine Aminotransferase (ALT/SGPT): 19 (02-10)  Aspartate Aminotransferase (AST/SGOT): 20 (02-16-24 @ 10:00)  Aspartate Aminotransferase (AST/SGOT): 14 (02-12-24 @ 06:00)  Aspartate Aminotransferase (AST/SGOT): 14 (02-11-24 @ 11:53)  Aspartate Aminotransferase (AST/SGOT): 18 (02-10-24 @ 15:40)  Bilirubin Direct: 0.2 (02-16)  Bilirubin Total: 0.7 (02-16)  Bilirubin Total: 0.5 (02-12)  Bilirubin Total: 0.5 (02-11)  Bilirubin Total: 0.5 (02-10)      Trend LDH      Urinalysis Basic - ( 20 Feb 2024 07:45 )    Color: x / Appearance: x / SG: x / pH: x  Gluc: 96 mg/dL / Ketone: x  / Bili: x / Urobili: x   Blood: x / Protein: x / Nitrite: x   Leuk Esterase: x / RBC: x / WBC x   Sq Epi: x / Non Sq Epi: x / Bacteria: x        MICROBIOLOGY:    Male    Culture - Urine (collected 10 Feb 2024 16:30)  Source: Clean Catch Clean Catch (Midstream)  Final Report:    No growth      RADIOLOGY & ADDITIONAL TESTS:  I have personally reviewed the relevant images.   CXR      CT  CT Abdomen and Pelvis w/ Oral Cont and w/ IV Cont:   ACC: [id number] EXAM:  CT ABDOMEN AND PELVIS OC IC   ORDERED BY: ANASTASIA LOUIS     PROCEDURE DATE:  02/19/2024          INTERPRETATION:  CLINICAL STATEMENT: Diverticulitis    TECHNIQUE: Contiguous axial CT images were obtained from the lower chest   to the pubic symphysis following administration of intravenous contrast.    80 cc administered of Omnipaque 350 (20 cc discarded).  Oral contrast was   administered.  Reformatted images in the coronal and sagittal planes were   acquired.    COMPARISON CT: February 16, 2024    OTHER STUDIES USED FOR CORRELATION: None.      FINDINGS:    LOWER CHEST: Bibasilar atelectasis and subpleural nodularity..    HEPATOBILIARY: No suspicious parenchymal lesion or biliary ductal   dilatation.    SPLEEN: Unremarkable.    PANCREAS: Unremarkable.    ADRENAL GLANDS: Unremarkable.    KIDNEYS: Symmetric renal enhancement without hydronephrosis bilaterally.    ABDOMINOPELVIC NODES: Unremarkable.    PELVIC ORGANS: Unchanged.    PERITONEUM/MESENTERY/BOWEL: Intervalresolution of previously described   small bowel dilatation and air-fluid levels. Interval decrease in   inflammatory changes surrounding pelvic collection. Multiloculated pelvic   collection currently measures approximately 3.8 x 3.3 cm, previously 6.3   x 4.4 cm. Diffuse bowel wall thickening involving sigmoid and descending   colon, unchanged. No significant ascites..    BONES/SOFT TISSUES: Unchanged.      IMPRESSION:    Since February 16, 2024;    Interval decrease in size multiloculated pelvic collection/abscess   currently measuring 3.8 x 3.3 cm, previously 6.3 x 4.4 cm. Interval   decrease in surrounding inflammatory changes.    Interval resolution of previously described small bowel dilatation and   air-fluid levels.    --- End of Report ---    ELLIOT LANDAU MD; Attending Radiologist  This document has been electronically signed. Feb 19 2024  4:02PM (02-19-24 @ 15:53)        WEIGHT  Weight (kg): 92 (02-16-24 @ 16:37)  Creatinine: 0.9 mg/dL (02-20-24 @ 07:45)      All available historical records have been reviewed

## 2024-02-20 NOTE — DISCHARGE NOTE NURSING/CASE MANAGEMENT/SOCIAL WORK - PATIENT PORTAL LINK FT
You can access the FollowMyHealth Patient Portal offered by [facility] by registering at the following website: [email or web address]. By joining [employer]’s FollowMyHealth portal, you will also be able to view your health information using other applications (apps) compatible with our system.

## 2024-02-20 NOTE — DISCHARGE NOTE PROVIDER - CARE PROVIDER_API CALL
Juan Carlos Rodas  Surgery  [address] [place] [address], [address]  [place] [address]  Phone: [phone]  Fax: [phone]  Follow Up Time: 1 week   Juan Carlos Rodas  Surgery  [address] [place] [address], [address]  [place] [address]  Phone: [phone]  Fax: [phone]  Follow Up Time: 2 weeks

## 2024-02-20 NOTE — DISCHARGE NOTE PROVIDER - NSDCCPCAREPLAN_GEN_ALL_CORE_FT
PRINCIPAL DISCHARGE DIAGNOSIS  Diagnosis: Colonic diverticular abscess  Assessment and Plan of Treatment: Diverticulitis  Diverticulitis is inflammation or infection of small pouches in your colon that form when you have a condition called diverticulosis. This condition can range from mild to severe potentially leading to perforation or obstructions of your colon. Symptoms include abdominal pain, fever/chills, nausea, vomiting, diarrhea, constipation, or blood in your stool. If you were prescribed an antibiotic medicine, take it as told by your health care provider. Do not stop taking the antibiotic even if you start to feel better.  SEEK IMMEDIATE MEDICAL CARE IF YOU HAVE THE FOLLOWING SYMPTOMS: worsening abdominal pain, high fever, inability to hold down liquids or medication, black or bloody stools, inability to pass gas, lightheadedness/dizziness, or a change in mental status.

## 2024-02-20 NOTE — DISCHARGE NOTE PROVIDER - HOSPITAL COURSE
Patient is a 52yo m w/ psxhx of hernia repair, pmhx of diverticulosis, recently admitted for acute diverticulitis and discharged on 02/12 on oral abx presenting back w/ complaints of nausea since yesterday around 1500 w/ loss of apetitie and two episodes of NBNB vomiting. Reports intermittent left sided generalzied  abdominal pain 2-7/10. Last BM this am w/ diarrhea, two normal BMs yesterday, Reports + Flatus. Also endorses intermittent episodes of chills and sweating.   Last colonoscopy on 01/11/24 w/ finding of diverticulosis  Denies fever, chest pain, sob, urinary sxs.     # Complicated acute diverticulitis associated with abscess  - WBC 13, , Afebrile  - CT AP: 6.3 x 4.4 cm bilocular pericolonic abscess seen abutting the inflamed sigmoid colon. Increased free fluid in the right lower quadrant.Proximal small bowel dilation with air-fluid levels may be secondary to reactive ileus or developing obstruction due to mass effect from the abscess.  - Admit to surgery  - NPO/IVF  - IV abx: Levofloxacin 750 mg q 24 hours and Flagyl 500 mg IV q 8 hours  - GI ppx   - Pain control prn  - Zofran prn for nausea   -GI/DVT prophylaxis (protonix, SQ lovenox)  - WBC= 13 --10.8; remained afebrile  - Per IR: no window for drainage  -having flatus/BM's  - Repeat Abd/Pelvis w/ oral and IV contrast 2/19: Interval decrease in size multiloculated pelvic collection/abscess currently measuring 3.8 x 3.3 cm, previously 6.3 x 4.4 cm. Interval   decrease in surrounding inflammatory changes/small bowel dilatation and air-fluid levels.  - Tolerating CLD well, advanced to soft diet on 2/19 --TOLERATED   - ID consult appreciated:     1. IV Ertapenem 1 gram 24 hours via midline (placed 2/19) for 3 weeks from initiation. (aware of penicillin allergy, should be okay to use)     2. Needs repeat CT abdomen in 2 weeks to ensure the abscess is decreasing otherwise, surgical intervention may need to be considered again.     3. Weekly CBC, CMP, ESR/CRP.     4. ID follow-up with Dr. Rickie Watson for Telehealth.  We will call the patient between 10:30-1:30.     Patient is a 52yo m w/ psxhx of hernia repair, pmhx of diverticulosis, recently admitted for acute diverticulitis and discharged on 02/12 on oral abx presenting back w/ complaints of nausea since yesterday around 1500 w/ loss of apetitie and two episodes of NBNB vomiting. Reports intermittent left sided generalzied  abdominal pain 2-7/10. Last BM this am w/ diarrhea, two normal BMs yesterday, Reports + Flatus. Also endorses intermittent episodes of chills and sweating.   Last colonoscopy on 01/11/24 w/ finding of diverticulosis  Denies fever, chest pain, sob, urinary sxs.     # Complicated acute diverticulitis associated with abscess  - WBC 13, , Afebrile  - CT AP: 6.3 x 4.4 cm bilocular pericolonic abscess seen abutting the inflamed sigmoid colon. Increased free fluid in the right lower quadrant.Proximal small bowel dilation with air-fluid levels may be secondary to reactive ileus or developing obstruction due to mass effect from the abscess.  - Admit to surgery  - NPO/IVF  - IV abx: Levofloxacin 750 mg q 24 hours and Flagyl 500 mg IV q 8 hours  - GI ppx   - Pain control prn  - Zofran prn for nausea   -GI/DVT prophylaxis (protonix, SQ lovenox)  - WBC= 13 --10.8; remained afebrile with mild thrombocytosis.  - Per IR: no window for drainage  -having flatus/BM's  - Repeat Abd/Pelvis w/ oral and IV contrast 2/19: Interval decrease in size multiloculated pelvic collection/abscess currently measuring 3.8 x 3.3 cm, previously 6.3 x 4.4 cm. Interval   decrease in surrounding inflammatory changes/small bowel dilatation and air-fluid levels.  - Tolerating CLD well, advanced to soft diet on 2/19 --TOLERATED   - ID consult appreciated:     1. IV Ertapenem 1 gram 24 hours via midline (placed 2/19) for 3 weeks from initiation. (aware of penicillin allergy, should be okay to use)     2. Needs repeat CT abdomen in 2 weeks to ensure the abscess is decreasing otherwise, surgical intervention may need to be considered again.     3. Weekly CBC, CMP, ESR/CRP.     4. ID follow-up with Dr. Rickie Watson for Telehealth.  We will call the patient between 10:30-1:30.    F/U Dr. Rodas March 1 office. CT outpt has been previously scheduled.

## 2024-02-20 NOTE — DISCHARGE NOTE NURSING/CASE MANAGEMENT/SOCIAL WORK - NSDCPEFALRISK_GEN_ALL_CORE
For information on Fall & Injury Prevention, visit: [email or web address] OR  [email or web address] OR  https://www.cdc.gov/steadi/patient.html

## 2024-02-22 DIAGNOSIS — E87.1 HYPO-OSMOLALITY AND HYPONATREMIA: ICD-10-CM

## 2024-02-22 DIAGNOSIS — K57.20 DIVERTICULITIS OF LARGE INTESTINE WITH PERFORATION AND ABSCESS WITHOUT BLEEDING: ICD-10-CM

## 2024-02-22 DIAGNOSIS — Z88.0 ALLERGY STATUS TO PENICILLIN: ICD-10-CM

## 2024-02-22 DIAGNOSIS — G47.00 INSOMNIA, UNSPECIFIED: ICD-10-CM

## 2024-02-22 DIAGNOSIS — E87.8 OTHER DISORDERS OF ELECTROLYTE AND FLUID BALANCE, NOT ELSEWHERE CLASSIFIED: ICD-10-CM

## 2024-02-22 DIAGNOSIS — R03.0 ELEVATED BLOOD-PRESSURE READING, WITHOUT DIAGNOSIS OF HYPERTENSION: ICD-10-CM

## 2024-02-27 DIAGNOSIS — K57.20 DIVERTICULITIS OF LARGE INTESTINE WITH PERFORATION AND ABSCESS WITHOUT BLEEDING: ICD-10-CM

## 2024-02-27 DIAGNOSIS — D75.839 THROMBOCYTOSIS, UNSPECIFIED: ICD-10-CM

## 2024-02-27 DIAGNOSIS — E66.9 OBESITY, UNSPECIFIED: ICD-10-CM

## 2024-02-27 DIAGNOSIS — K56.7 ILEUS, UNSPECIFIED: ICD-10-CM

## 2024-03-01 ENCOUNTER — APPOINTMENT (OUTPATIENT)
Dept: SURGERY | Facility: CLINIC | Age: 54
End: 2024-03-01
Payer: COMMERCIAL

## 2024-03-01 PROCEDURE — 99213 OFFICE O/P EST LOW 20 MIN: CPT

## 2024-03-01 NOTE — PLAN
[FreeTextEntry1] : He continues to do well.  I reviewed the CT.  Antibiotics to continue as scheduled until Tuesday.  He may have the PICC line removed after that.  I will see him back March 15.  We will keep him out of work until March 18 with that reevaluation.  He is still with low energy levels and is working to regain his health.  We discussed briefly the role for surgery in this type of situation.  I would count this as 1 episode that was not resolved.  I think it may be reasonable for him to consider surgery if he has another episode of this nature or if he is very much bothered by it and would like to consider it.  Perhaps another opinion would be useful if he would like to have surgery considered.  All his questions were answered.  I will see him back in the office in 2 weeks and we will provide him with a note today.  Thank you

## 2024-03-01 NOTE — HISTORY OF PRESENT ILLNESS
[de-identified] : This is a pleasant 53-year-old male who had a perforated diverticulitis with a small abscess.  He was discharged from the hospital a week ago with IV antibiotics.  He had a repeat scan yesterday which apparently shows resolution of the abscess and no evidence of other major abnormality.  He is scheduled to continue the IV antibiotics until Tuesday.  He feels mostly normal at this time and is eating a normal diet passing normal bowel movements not having abdominal pain at all.  He has a bit of diminished energy levels however overall he feels much improved.

## 2024-03-01 NOTE — DATA REVIEWED
[FreeTextEntry1] : CT demonstrated no evidence of small bowel obstruction resolution of the pericolonic abscess and a small amount of streaking artifact reflective of inflammation.  A fistula could not be excluded there is no specific evidence clinically for that.

## 2024-03-11 ENCOUNTER — APPOINTMENT (OUTPATIENT)
Dept: GASTROENTEROLOGY | Facility: CLINIC | Age: 54
End: 2024-03-11
Payer: COMMERCIAL

## 2024-03-11 VITALS
WEIGHT: 216 LBS | BODY MASS INDEX: 30.92 KG/M2 | HEART RATE: 76 BPM | HEIGHT: 70 IN | SYSTOLIC BLOOD PRESSURE: 122 MMHG | DIASTOLIC BLOOD PRESSURE: 82 MMHG | OXYGEN SATURATION: 98 %

## 2024-03-11 DIAGNOSIS — Z78.9 OTHER SPECIFIED HEALTH STATUS: ICD-10-CM

## 2024-03-11 DIAGNOSIS — R10.13 EPIGASTRIC PAIN: ICD-10-CM

## 2024-03-11 PROCEDURE — 99213 OFFICE O/P EST LOW 20 MIN: CPT

## 2024-03-11 RX ORDER — FAMOTIDINE 40 MG/1
40 TABLET, FILM COATED ORAL
Qty: 1 | Refills: 6 | Status: ACTIVE | COMMUNITY
Start: 2024-03-11

## 2024-03-11 NOTE — PHYSICAL EXAM
[Alert] : alert [Normal Voice/Communication] : normal voice/communication [Well Developed] : well developed [No Acute Distress] : no acute distress [Obese (BMI >= 30)] : obese (BMI >= 30) [Sclera] : the sclera and conjunctiva were normal [Hearing Threshold Finger Rub Not Garr[place]] : hearing was normal [Normal Lips/Gums] : the lips and gums were normal [Normal Appearance] : the appearance of the neck was normal [No Respiratory Distress] : no respiratory distress [No Acc Muscle Use] : no accessory muscle use [Respiration, Rhythm And Depth] : normal respiratory rhythm and effort [Auscultation Breath Sounds / Voice Sounds] : lungs were clear to auscultation bilaterally [Heart Rate And Rhythm] : heart rate was normal and rhythm regular [Normal S1, S2] : normal S1 and S2 [Murmurs] : no murmurs [Bowel Sounds] : normal bowel sounds [Abdomen Tenderness] : non-tender [No Masses] : no abdominal mass palpated [Abdomen Soft] : soft [] : no hepatosplenomegaly [Abnormal Walk] : normal gait [Normal Color / Pigmentation] : normal skin color and pigmentation [Oriented To Time, Place, And Person] : oriented to person, place, and time

## 2024-03-15 ENCOUNTER — APPOINTMENT (OUTPATIENT)
Dept: SURGERY | Facility: CLINIC | Age: 54
End: 2024-03-15
Payer: COMMERCIAL

## 2024-03-15 DIAGNOSIS — K57.20 DIVERTICULITIS OF LARGE INTESTINE WITH PERFORATION AND ABSCESS W/OUT BLEEDING: ICD-10-CM

## 2024-03-15 PROCEDURE — 99213 OFFICE O/P EST LOW 20 MIN: CPT

## 2024-03-15 NOTE — PLAN
[FreeTextEntry1] : He may follow-up with me as needed.  GI notes were reviewed.  I agree with that plan.  The patient would like referral to discuss his case and care with our colorectal specialist.  I will add a referral to Dr. Teague so he can get into see him at some point and discuss surgical management of diverticulitis further.  All his questions were answered.  It is been a pleasure assisting in the care of this nice patient.

## 2024-03-15 NOTE — PHYSICAL EXAM
[Normal Breath Sounds] : Normal breath sounds [No Rash or Lesion] : No rash or lesion [Alert] : alert [Oriented to Place] : oriented to place [Oriented to Person] : oriented to person [Calm] : calm [de-identified] : NAD [de-identified] : No masses [de-identified] : SUPA BALES no CCE [de-identified] : Soft nontender nondistended

## 2024-03-29 ENCOUNTER — TRANSCRIPTION ENCOUNTER (OUTPATIENT)
Age: 54
End: 2024-03-29

## 2024-05-20 ENCOUNTER — NON-APPOINTMENT (OUTPATIENT)
Age: 54
End: 2024-05-20

## 2024-05-21 ENCOUNTER — APPOINTMENT (OUTPATIENT)
Dept: CARDIOLOGY | Facility: CLINIC | Age: 54
End: 2024-05-21
Payer: COMMERCIAL

## 2024-05-21 VITALS
DIASTOLIC BLOOD PRESSURE: 80 MMHG | HEIGHT: 70 IN | SYSTOLIC BLOOD PRESSURE: 124 MMHG | HEART RATE: 84 BPM | BODY MASS INDEX: 29.06 KG/M2 | WEIGHT: 203 LBS

## 2024-05-21 DIAGNOSIS — E78.5 HYPERLIPIDEMIA, UNSPECIFIED: ICD-10-CM

## 2024-05-21 DIAGNOSIS — Z00.00 ENCOUNTER FOR GENERAL ADULT MEDICAL EXAMINATION W/OUT ABNORMAL FINDINGS: ICD-10-CM

## 2024-05-21 PROCEDURE — 99214 OFFICE O/P EST MOD 30 MIN: CPT | Mod: 25

## 2024-05-21 PROCEDURE — 93000 ELECTROCARDIOGRAM COMPLETE: CPT

## 2024-05-21 RX ORDER — CIPROFLOXACIN HYDROCHLORIDE 500 MG/1
500 TABLET, FILM COATED ORAL TWICE DAILY
Qty: 28 | Refills: 1 | Status: DISCONTINUED | COMMUNITY
Start: 2024-03-11 | End: 2024-05-21

## 2024-05-21 RX ORDER — MELOXICAM 15 MG/1
15 TABLET ORAL
Refills: 0 | Status: ACTIVE | COMMUNITY

## 2024-05-21 RX ORDER — METRONIDAZOLE 500 MG/1
500 TABLET ORAL 4 TIMES DAILY
Qty: 56 | Refills: 0 | Status: DISCONTINUED | COMMUNITY
Start: 2024-03-11 | End: 2024-05-21

## 2024-05-21 NOTE — HISTORY OF PRESENT ILLNESS
[FreeTextEntry1] : Mr. Greenberg is a 53-year-old man with history of hyperlipidemia presenting for evaluation of an abnormal ECG and intermittent dyspnea/chest discomfort.  First visit: "At baseline patient is very physically active, exercising several times a week with occasional high intensity interval training. 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 Last week he had a cramping left upper abdominal discomfort that passed after eructating and has not returned since."  Was admitted for diverticulosis 2/2024, treated with antibiotics, no surgery planned. Has resumed exercising since then. No dyspnea or chest discomfort.  ECG today shows NSR, normal axis, normal intervals. ECG at an OSH from last year showed inferior and lateral TWI.  TTE 3/2023 - Normal biventricular function, no significant valvular disease  Labs: - , HDL 41, TG 80, , non- - Cr 0.88, K 4.3 - A1c 5.1%, pBNP <10, TSH 2.7, lp(a) 30, CRP 0.5 (<8)  Meds: - Lunesta - Famotidine - Meloxicam

## 2024-05-21 NOTE — ASSESSMENT
[FreeTextEntry1] : Mr. Greenberg is a 52-year-old man with history of hyperlipidemia presenting for evaluation of an abnormal ECG and intermittent dyspnea/chest discomfort.  Impression: (1) Dyspnea on exertion, though still has a high exercise tolerance. Chest discomfort is non-cardiac by description (non-exertional, not better with rest, predominantly LUQ) and unlikely to be cardiac in origin. (2) Abnormal ECG - prior ECG with nonspecific T wave abnormality. (3) Mild HLD, ASCVD 3.8%  Plan: - Discussed results from TTE and labs in detail. - Discussed the utility overall of CAC scoring. Patient is interested - will facilitate. - Repeat labs to reassess LDL - will follow up results from PCP. - Lifestyle discussed  RTC 6-12 months, sooner as needed

## 2024-06-21 NOTE — HISTORY OF PRESENT ILLNESS
[FreeTextEntry1] : 52 yo M w/ diverticular disease/diverticulitis presents with abdominal pain and diarrhea x 2-3 days with CT imaging showing acute sigmoid diverticulitis with possible phlegmon/developing abscess. He was sent home on 2/1/24 but was readmitted on 2/16/24 due to still not feeling well and repeat CT scan of Abdomen showed a 6.3 x 4.4 cm bilocular abscess abutting the inflamed sigmoid colon. He was therefore followed closely by Surgery and ID and was D/Cd home on 2/20/24 on IV antibiotics. IR drainage could not be done due to no drainable window at that time.  Repeat CT scan of Abdomen done 2/19/24 showed a decrease in size of the abscess down to 3.8 x 3.3 cm with diffuse bowel wall thickening of the sigmoid and ascending colon. Repeat CT scan done 2/29/24  showed no further abscess. He saw Surgeon Dr. Rodas on 3/1/24; IV antibiotics were discontinued on the night of 3/6/24 and PICC line was removed on 3/8/24.  He denied recent abdominal pain, diarrhea, fevers, vomiting, dysphagia, or blood in the stool. He has a H/O epigastric discomfort for which he takes Famotidine 40 mg at hs with good response. He takes a probiotic daily.

## 2024-06-21 NOTE — ASSESSMENT
[FreeTextEntry1] : 52 yo M w/ diverticular disease/diverticulitis presents with abdominal pain and diarrhea x 2-3 days with CT imaging showing acute sigmoid diverticulitis with possible phlegmon/developing abscess. He was sent home on 2/1/24 but was readmitted on 2/16/24 due to still not feeling well and repeat CT scan of Abdomen showed a 6.3 x 4.4 cm bilocular abscess abutting the inflamed sigmoid colon. He was therefore followed closely by Surgery and ID and was D/Cd home on 2/20/24 on IV antibiotics. IR drainage could not be done due to no drainable window at that time.  Repeat CT scan of Abdomen done 2/19/24 showed a decrease in size of the abscess down to 3.8 x 3.3 cm with diffuse bowel wall thickening of the sigmoid and ascending colon. Repeat CT scan done 2/29/24  showed no further abscess. He saw Surgeon Dr. Rodas on 3/1/24; IV antibiotics were discontinued on the night of 3/6/24 and PICC line was removed on 3/8/24.  He denied recent abdominal pain, diarrhea, fevers, vomiting, dysphagia, or blood in the stool. He has a H/O epigastric discomfort for which he takes Famotidine 40 mg at hs with good response. He takes a probiotic daily.    Sigmoid Colon Perforated diverticulitis with Abscess - CT scans of Abdomen reviewed including most recent one done on 2/29/24 which showed resolution of the abscess - Will schedule a colonoscopy later this year (about 9/23) unless he has another flare-up of diverticulitis before then - Also discussed with patient the recommendation for surgery if he has another episode of diverticulitis - Diet also discussed at length - He was given a 2 week course of Cipro and Flagyl to take with him when he travels to [place] in 3 weeks in case he develops symptoms again  - F/U with Surgeon Dr. Rodas

## 2024-06-21 NOTE — REVIEW OF SYSTEMS
[As Noted in HPI] : as noted in HPI [Negative] : Endocrine [Abdominal Pain] : no abdominal pain [Vomiting] : no vomiting [Constipation] : no constipation [Diarrhea] : no diarrhea [Heartburn] : no heartburn [Melena (black stool)] : no melena [Bleeding] : no bleeding [Fecal Incontinence (soiling)] : no fecal incontinence [Bloating (gassiness)] : no bloating

## 2024-06-25 ENCOUNTER — APPOINTMENT (OUTPATIENT)
Dept: SURGERY | Facility: CLINIC | Age: 54
End: 2024-06-25

## 2024-06-25 VITALS
SYSTOLIC BLOOD PRESSURE: 128 MMHG | WEIGHT: 208 LBS | DIASTOLIC BLOOD PRESSURE: 86 MMHG | OXYGEN SATURATION: 97 % | BODY MASS INDEX: 29.78 KG/M2 | TEMPERATURE: 97.1 F | HEART RATE: 84 BPM | HEIGHT: 70 IN

## 2024-06-25 DIAGNOSIS — Z86.39 PERSONAL HISTORY OF OTHER ENDOCRINE, NUTRITIONAL AND METABOLIC DISEASE: ICD-10-CM

## 2024-06-25 PROCEDURE — 99204 OFFICE O/P NEW MOD 45 MIN: CPT

## 2024-07-01 PROBLEM — Z86.39 HISTORY OF HYPERLIPIDEMIA: Status: RESOLVED | Noted: 2024-07-01 | Resolved: 2024-07-01

## 2024-07-05 ENCOUNTER — EMERGENCY (EMERGENCY)
Facility: HOSPITAL | Age: 54
LOS: 0 days | Discharge: HOME | End: 2024-07-05
Attending: STUDENT IN AN ORGANIZED HEALTH CARE EDUCATION/TRAINING PROGRAM
Payer: COMMERCIAL

## 2024-07-05 VITALS
TEMPERATURE: 99 F | OXYGEN SATURATION: 95 % | SYSTOLIC BLOOD PRESSURE: 143 MMHG | RESPIRATION RATE: 18 BRPM | DIASTOLIC BLOOD PRESSURE: 72 MMHG

## 2024-07-05 VITALS
OXYGEN SATURATION: 97 % | DIASTOLIC BLOOD PRESSURE: 88 MMHG | SYSTOLIC BLOOD PRESSURE: 159 MMHG | TEMPERATURE: 98 F | RESPIRATION RATE: 103 BRPM

## 2024-07-05 DIAGNOSIS — R10.9 UNSPECIFIED ABDOMINAL PAIN: ICD-10-CM

## 2024-07-05 DIAGNOSIS — Z88.0 ALLERGY STATUS TO PENICILLIN: ICD-10-CM

## 2024-07-05 DIAGNOSIS — R11.0 NAUSEA: ICD-10-CM

## 2024-07-05 DIAGNOSIS — K57.32 DIVERTICULITIS OF LARGE INTESTINE WITHOUT PERFORATION OR ABSCESS WITHOUT BLEEDING: ICD-10-CM

## 2024-07-05 LAB
ALBUMIN SERPL ELPH-MCNC: 4.3 G/DL — SIGNIFICANT CHANGE UP (ref 3.5–5.2)
ALP SERPL-CCNC: 66 U/L — SIGNIFICANT CHANGE UP (ref 30–115)
ALT FLD-CCNC: 33 U/L — SIGNIFICANT CHANGE UP (ref 0–41)
ANION GAP SERPL CALC-SCNC: 13 MMOL/L — SIGNIFICANT CHANGE UP (ref 7–14)
APPEARANCE UR: CLEAR — SIGNIFICANT CHANGE UP
AST SERPL-CCNC: 28 U/L — SIGNIFICANT CHANGE UP (ref 0–41)
BASOPHILS # BLD AUTO: 0.03 K/UL — SIGNIFICANT CHANGE UP (ref 0–0.2)
BASOPHILS NFR BLD AUTO: 0.5 % — SIGNIFICANT CHANGE UP (ref 0–1)
BILIRUB SERPL-MCNC: 0.5 MG/DL — SIGNIFICANT CHANGE UP (ref 0.2–1.2)
BILIRUB UR-MCNC: NEGATIVE — SIGNIFICANT CHANGE UP
BUN SERPL-MCNC: 7 MG/DL — LOW (ref 10–20)
CALCIUM SERPL-MCNC: 9.5 MG/DL — SIGNIFICANT CHANGE UP (ref 8.4–10.5)
CHLORIDE SERPL-SCNC: 93 MMOL/L — LOW (ref 98–110)
CO2 SERPL-SCNC: 24 MMOL/L — SIGNIFICANT CHANGE UP (ref 17–32)
COLOR SPEC: YELLOW — SIGNIFICANT CHANGE UP
CREAT SERPL-MCNC: 0.9 MG/DL — SIGNIFICANT CHANGE UP (ref 0.7–1.5)
DIFF PNL FLD: NEGATIVE — SIGNIFICANT CHANGE UP
EGFR: 103 ML/MIN/1.73M2 — SIGNIFICANT CHANGE UP
EOSINOPHIL # BLD AUTO: 0.01 K/UL — SIGNIFICANT CHANGE UP (ref 0–0.7)
EOSINOPHIL NFR BLD AUTO: 0.2 % — SIGNIFICANT CHANGE UP (ref 0–8)
GLUCOSE SERPL-MCNC: 93 MG/DL — SIGNIFICANT CHANGE UP (ref 70–99)
GLUCOSE UR QL: NEGATIVE MG/DL — SIGNIFICANT CHANGE UP
HCT VFR BLD CALC: 44 % — SIGNIFICANT CHANGE UP (ref 42–52)
HGB BLD-MCNC: 15.7 G/DL — SIGNIFICANT CHANGE UP (ref 14–18)
IMM GRANULOCYTES NFR BLD AUTO: 0.3 % — SIGNIFICANT CHANGE UP (ref 0.1–0.3)
KETONES UR-MCNC: NEGATIVE MG/DL — SIGNIFICANT CHANGE UP
LACTATE SERPL-SCNC: 1.7 MMOL/L — SIGNIFICANT CHANGE UP (ref 0.7–2)
LEUKOCYTE ESTERASE UR-ACNC: NEGATIVE — SIGNIFICANT CHANGE UP
LYMPHOCYTES # BLD AUTO: 1.26 K/UL — SIGNIFICANT CHANGE UP (ref 1.2–3.4)
LYMPHOCYTES # BLD AUTO: 20.7 % — SIGNIFICANT CHANGE UP (ref 20.5–51.1)
MCHC RBC-ENTMCNC: 32 PG — HIGH (ref 27–31)
MCHC RBC-ENTMCNC: 35.7 G/DL — SIGNIFICANT CHANGE UP (ref 32–37)
MCV RBC AUTO: 89.8 FL — SIGNIFICANT CHANGE UP (ref 80–94)
MONOCYTES # BLD AUTO: 0.49 K/UL — SIGNIFICANT CHANGE UP (ref 0.1–0.6)
MONOCYTES NFR BLD AUTO: 8 % — SIGNIFICANT CHANGE UP (ref 1.7–9.3)
NEUTROPHILS # BLD AUTO: 4.29 K/UL — SIGNIFICANT CHANGE UP (ref 1.4–6.5)
NEUTROPHILS NFR BLD AUTO: 70.3 % — SIGNIFICANT CHANGE UP (ref 42.2–75.2)
NITRITE UR-MCNC: NEGATIVE — SIGNIFICANT CHANGE UP
NRBC # BLD: 0 /100 WBCS — SIGNIFICANT CHANGE UP (ref 0–0)
PH UR: 7 — SIGNIFICANT CHANGE UP (ref 5–8)
PLATELET # BLD AUTO: 408 K/UL — HIGH (ref 130–400)
PMV BLD: 8.4 FL — SIGNIFICANT CHANGE UP (ref 7.4–10.4)
POTASSIUM SERPL-MCNC: 4.2 MMOL/L — SIGNIFICANT CHANGE UP (ref 3.5–5)
POTASSIUM SERPL-SCNC: 4.2 MMOL/L — SIGNIFICANT CHANGE UP (ref 3.5–5)
PROT SERPL-MCNC: 7 G/DL — SIGNIFICANT CHANGE UP (ref 6–8)
PROT UR-MCNC: NEGATIVE MG/DL — SIGNIFICANT CHANGE UP
RBC # BLD: 4.9 M/UL — SIGNIFICANT CHANGE UP (ref 4.7–6.1)
RBC # FLD: 11.1 % — LOW (ref 11.5–14.5)
SODIUM SERPL-SCNC: 130 MMOL/L — LOW (ref 135–146)
SP GR SPEC: 1.01 — SIGNIFICANT CHANGE UP (ref 1–1.03)
UROBILINOGEN FLD QL: 0.2 MG/DL — SIGNIFICANT CHANGE UP (ref 0.2–1)
WBC # BLD: 6.1 K/UL — SIGNIFICANT CHANGE UP (ref 4.8–10.8)
WBC # FLD AUTO: 6.1 K/UL — SIGNIFICANT CHANGE UP (ref 4.8–10.8)

## 2024-07-05 PROCEDURE — 81003 URINALYSIS AUTO W/O SCOPE: CPT

## 2024-07-05 PROCEDURE — 99285 EMERGENCY DEPT VISIT HI MDM: CPT

## 2024-07-05 PROCEDURE — 74177 CT ABD & PELVIS W/CONTRAST: CPT | Mod: 26,MC

## 2024-07-05 PROCEDURE — 83605 ASSAY OF LACTIC ACID: CPT

## 2024-07-05 PROCEDURE — 96374 THER/PROPH/DIAG INJ IV PUSH: CPT | Mod: XU

## 2024-07-05 PROCEDURE — 96375 TX/PRO/DX INJ NEW DRUG ADDON: CPT

## 2024-07-05 PROCEDURE — 74177 CT ABD & PELVIS W/CONTRAST: CPT | Mod: MC

## 2024-07-05 PROCEDURE — 99284 EMERGENCY DEPT VISIT MOD MDM: CPT | Mod: 25

## 2024-07-05 PROCEDURE — 80053 COMPREHEN METABOLIC PANEL: CPT

## 2024-07-05 PROCEDURE — 85025 COMPLETE CBC W/AUTO DIFF WBC: CPT

## 2024-07-05 RX ORDER — METRONIDAZOLE 500 MG/1
500 TABLET ORAL ONCE
Refills: 0 | Status: COMPLETED | OUTPATIENT
Start: 2024-07-05 | End: 2024-07-05

## 2024-07-05 RX ORDER — CIPROFLOXACIN HCL 500 MG
400 TABLET ORAL ONCE
Refills: 0 | Status: COMPLETED | OUTPATIENT
Start: 2024-07-05 | End: 2024-07-05

## 2024-07-05 RX ORDER — KETOROLAC TROMETHAMINE 30 MG/ML
30 INJECTION, SOLUTION INTRAMUSCULAR ONCE
Refills: 0 | Status: DISCONTINUED | OUTPATIENT
Start: 2024-07-05 | End: 2024-07-05

## 2024-07-05 RX ORDER — SODIUM CHLORIDE 0.9 % (FLUSH) 0.9 %
1000 SYRINGE (ML) INJECTION ONCE
Refills: 0 | Status: COMPLETED | OUTPATIENT
Start: 2024-07-05 | End: 2024-07-05

## 2024-07-05 RX ADMIN — KETOROLAC TROMETHAMINE 30 MILLIGRAM(S): 30 INJECTION, SOLUTION INTRAMUSCULAR at 15:09

## 2024-07-05 RX ADMIN — METRONIDAZOLE 100 MILLIGRAM(S): 500 TABLET ORAL at 12:23

## 2024-07-05 RX ADMIN — Medication 1000 MILLILITER(S): at 12:23

## 2024-07-05 RX ADMIN — Medication 200 MILLIGRAM(S): at 12:24

## 2024-07-08 PROBLEM — Z86.39 HISTORY OF HYPERLIPIDEMIA: Status: RESOLVED | Noted: 2024-07-01 | Resolved: 2024-07-08

## 2024-07-12 ENCOUNTER — APPOINTMENT (OUTPATIENT)
Dept: SURGERY | Facility: CLINIC | Age: 54
End: 2024-07-12
Payer: COMMERCIAL

## 2024-07-12 DIAGNOSIS — K57.20 DIVERTICULITIS OF LARGE INTESTINE WITH PERFORATION AND ABSCESS W/OUT BLEEDING: ICD-10-CM

## 2024-07-12 PROCEDURE — 99214 OFFICE O/P EST MOD 30 MIN: CPT

## 2024-08-21 ENCOUNTER — INPATIENT (INPATIENT)
Facility: HOSPITAL | Age: 54
LOS: 7 days | Discharge: HOME HEALTH | DRG: 395 | End: 2024-08-29
Attending: SURGERY | Admitting: SURGERY
Payer: COMMERCIAL

## 2024-08-21 VITALS
DIASTOLIC BLOOD PRESSURE: 84 MMHG | HEART RATE: 122 BPM | WEIGHT: 194.01 LBS | SYSTOLIC BLOOD PRESSURE: 156 MMHG | RESPIRATION RATE: 18 BRPM | OXYGEN SATURATION: 95 % | TEMPERATURE: 99 F | HEIGHT: 70 IN

## 2024-08-21 LAB
BASOPHILS # BLD AUTO: 0.03 K/UL — SIGNIFICANT CHANGE UP (ref 0–0.2)
BASOPHILS NFR BLD AUTO: 0.2 % — SIGNIFICANT CHANGE UP (ref 0–1)
EOSINOPHIL # BLD AUTO: 0 K/UL — SIGNIFICANT CHANGE UP (ref 0–0.7)
EOSINOPHIL NFR BLD AUTO: 0 % — SIGNIFICANT CHANGE UP (ref 0–8)
HCT VFR BLD CALC: 38.9 % — LOW (ref 42–52)
HGB BLD-MCNC: 14.3 G/DL — SIGNIFICANT CHANGE UP (ref 14–18)
IMM GRANULOCYTES NFR BLD AUTO: 0.4 % — HIGH (ref 0.1–0.3)
LYMPHOCYTES # BLD AUTO: 0.84 K/UL — LOW (ref 1.2–3.4)
LYMPHOCYTES # BLD AUTO: 4.6 % — LOW (ref 20.5–51.1)
MCHC RBC-ENTMCNC: 32.8 PG — HIGH (ref 27–31)
MCHC RBC-ENTMCNC: 36.8 G/DL — SIGNIFICANT CHANGE UP (ref 32–37)
MCV RBC AUTO: 89.2 FL — SIGNIFICANT CHANGE UP (ref 80–94)
MONOCYTES # BLD AUTO: 0.55 K/UL — SIGNIFICANT CHANGE UP (ref 0.1–0.6)
MONOCYTES NFR BLD AUTO: 3 % — SIGNIFICANT CHANGE UP (ref 1.7–9.3)
NEUTROPHILS # BLD AUTO: 16.97 K/UL — HIGH (ref 1.4–6.5)
NEUTROPHILS NFR BLD AUTO: 91.8 % — HIGH (ref 42.2–75.2)
NRBC # BLD: 0 /100 WBCS — SIGNIFICANT CHANGE UP (ref 0–0)
PLATELET # BLD AUTO: 340 K/UL — SIGNIFICANT CHANGE UP (ref 130–400)
PMV BLD: 8.3 FL — SIGNIFICANT CHANGE UP (ref 7.4–10.4)
RBC # BLD: 4.36 M/UL — LOW (ref 4.7–6.1)
RBC # FLD: 11.1 % — LOW (ref 11.5–14.5)
WBC # BLD: 18.46 K/UL — HIGH (ref 4.8–10.8)
WBC # FLD AUTO: 18.46 K/UL — HIGH (ref 4.8–10.8)

## 2024-08-21 PROCEDURE — 99285 EMERGENCY DEPT VISIT HI MDM: CPT

## 2024-08-21 RX ORDER — METRONIDAZOLE 250 MG
TABLET ORAL
Refills: 0 | Status: COMPLETED | OUTPATIENT
Start: 2024-08-22 | End: 2024-08-22

## 2024-08-21 RX ORDER — METRONIDAZOLE 250 MG
500 TABLET ORAL ONCE
Refills: 0 | Status: COMPLETED | OUTPATIENT
Start: 2024-08-21 | End: 2024-08-22

## 2024-08-21 RX ORDER — SODIUM CHLORIDE 9 MG/ML
2700 INJECTION INTRAMUSCULAR; INTRAVENOUS; SUBCUTANEOUS ONCE
Refills: 0 | Status: COMPLETED | OUTPATIENT
Start: 2024-08-21 | End: 2024-08-21

## 2024-08-21 RX ORDER — SODIUM CHLORIDE 9 MG/ML
1000 INJECTION INTRAMUSCULAR; INTRAVENOUS; SUBCUTANEOUS ONCE
Refills: 0 | Status: COMPLETED | OUTPATIENT
Start: 2024-08-21 | End: 2024-08-21

## 2024-08-21 RX ORDER — IOHEXOL 350 MG/ML
30 INJECTION, SOLUTION INTRAVENOUS ONCE
Refills: 0 | Status: COMPLETED | OUTPATIENT
Start: 2024-08-21 | End: 2024-08-21

## 2024-08-21 RX ORDER — METRONIDAZOLE 250 MG
500 TABLET ORAL EVERY 8 HOURS
Refills: 0 | Status: COMPLETED | OUTPATIENT
Start: 2024-08-22 | End: 2024-08-22

## 2024-08-21 RX ADMIN — IOHEXOL 30 MILLILITER(S): 350 INJECTION, SOLUTION INTRAVENOUS at 23:54

## 2024-08-21 RX ADMIN — Medication 4 MILLIGRAM(S): at 23:32

## 2024-08-21 NOTE — ED ADULT NURSE NOTE - NS ED PATIENT SAFETY CONCERN
[Mother] : mother [Breast milk] : breast milk [Normal] : Normal [In Bassinet/Crib] : sleeps in bassinet/crib [On back] : sleeps on back [No] : No cigarette smoke exposure No [Water heater temperature set at <120 degrees F] : Water heater temperature set at <120 degrees F [Rear facing car seat in back seat] : Rear facing car seat in back seat [Carbon Monoxide Detectors] : Carbon monoxide detectors at home [Smoke Detectors] : Smoke detectors at home. [Co-sleeping] : no co-sleeping [Gun in Home] : No gun in home [At risk for exposure to TB] : Not at risk for exposure to Tuberculosis

## 2024-08-22 DIAGNOSIS — K63.1 PERFORATION OF INTESTINE (NONTRAUMATIC): ICD-10-CM

## 2024-08-22 LAB
ALBUMIN SERPL ELPH-MCNC: 4.2 G/DL — SIGNIFICANT CHANGE UP (ref 3.5–5.2)
ALP SERPL-CCNC: 69 U/L — SIGNIFICANT CHANGE UP (ref 30–115)
ALT FLD-CCNC: 23 U/L — SIGNIFICANT CHANGE UP (ref 0–41)
ANION GAP SERPL CALC-SCNC: 10 MMOL/L — SIGNIFICANT CHANGE UP (ref 7–14)
ANION GAP SERPL CALC-SCNC: 15 MMOL/L — HIGH (ref 7–14)
APPEARANCE UR: CLEAR — SIGNIFICANT CHANGE UP
AST SERPL-CCNC: 26 U/L — SIGNIFICANT CHANGE UP (ref 0–41)
BACTERIA # UR AUTO: NEGATIVE /HPF — SIGNIFICANT CHANGE UP
BILIRUB SERPL-MCNC: 0.9 MG/DL — SIGNIFICANT CHANGE UP (ref 0.2–1.2)
BILIRUB UR-MCNC: NEGATIVE — SIGNIFICANT CHANGE UP
BUN SERPL-MCNC: 6 MG/DL — LOW (ref 10–20)
BUN SERPL-MCNC: 8 MG/DL — LOW (ref 10–20)
CALCIUM SERPL-MCNC: 8.7 MG/DL — SIGNIFICANT CHANGE UP (ref 8.4–10.5)
CALCIUM SERPL-MCNC: 9.2 MG/DL — SIGNIFICANT CHANGE UP (ref 8.4–10.5)
CHLORIDE SERPL-SCNC: 89 MMOL/L — LOW (ref 98–110)
CHLORIDE SERPL-SCNC: 99 MMOL/L — SIGNIFICANT CHANGE UP (ref 98–110)
CO2 SERPL-SCNC: 21 MMOL/L — SIGNIFICANT CHANGE UP (ref 17–32)
CO2 SERPL-SCNC: 23 MMOL/L — SIGNIFICANT CHANGE UP (ref 17–32)
COLOR SPEC: ABNORMAL
CREAT SERPL-MCNC: 0.7 MG/DL — SIGNIFICANT CHANGE UP (ref 0.7–1.5)
CREAT SERPL-MCNC: 0.9 MG/DL — SIGNIFICANT CHANGE UP (ref 0.7–1.5)
DIFF PNL FLD: NEGATIVE — SIGNIFICANT CHANGE UP
EGFR: 103 ML/MIN/1.73M2 — SIGNIFICANT CHANGE UP
EGFR: 111 ML/MIN/1.73M2 — SIGNIFICANT CHANGE UP
EPI CELLS # UR: SIGNIFICANT CHANGE UP
GLUCOSE SERPL-MCNC: 131 MG/DL — HIGH (ref 70–99)
GLUCOSE SERPL-MCNC: 98 MG/DL — SIGNIFICANT CHANGE UP (ref 70–99)
GLUCOSE UR QL: NEGATIVE MG/DL — SIGNIFICANT CHANGE UP
HCT VFR BLD CALC: 36.6 % — LOW (ref 42–52)
HGB BLD-MCNC: 13.5 G/DL — LOW (ref 14–18)
KETONES UR-MCNC: NEGATIVE MG/DL — SIGNIFICANT CHANGE UP
LACTATE SERPL-SCNC: 1.2 MMOL/L — SIGNIFICANT CHANGE UP (ref 0.7–2)
LACTATE SERPL-SCNC: 2.2 MMOL/L — HIGH (ref 0.7–2)
LEUKOCYTE ESTERASE UR-ACNC: NEGATIVE — SIGNIFICANT CHANGE UP
LIDOCAIN IGE QN: 16 U/L — SIGNIFICANT CHANGE UP (ref 7–60)
MCHC RBC-ENTMCNC: 33.3 PG — HIGH (ref 27–31)
MCHC RBC-ENTMCNC: 36.9 G/DL — SIGNIFICANT CHANGE UP (ref 32–37)
MCV RBC AUTO: 90.4 FL — SIGNIFICANT CHANGE UP (ref 80–94)
NITRITE UR-MCNC: NEGATIVE — SIGNIFICANT CHANGE UP
NRBC # BLD: 0 /100 WBCS — SIGNIFICANT CHANGE UP (ref 0–0)
PH UR: 8 — SIGNIFICANT CHANGE UP (ref 5–8)
PLATELET # BLD AUTO: 339 K/UL — SIGNIFICANT CHANGE UP (ref 130–400)
PMV BLD: 8.4 FL — SIGNIFICANT CHANGE UP (ref 7.4–10.4)
POTASSIUM SERPL-MCNC: 4 MMOL/L — SIGNIFICANT CHANGE UP (ref 3.5–5)
POTASSIUM SERPL-MCNC: 4.1 MMOL/L — SIGNIFICANT CHANGE UP (ref 3.5–5)
POTASSIUM SERPL-SCNC: 4 MMOL/L — SIGNIFICANT CHANGE UP (ref 3.5–5)
POTASSIUM SERPL-SCNC: 4.1 MMOL/L — SIGNIFICANT CHANGE UP (ref 3.5–5)
PROT SERPL-MCNC: 6.5 G/DL — SIGNIFICANT CHANGE UP (ref 6–8)
PROT UR-MCNC: NEGATIVE MG/DL — SIGNIFICANT CHANGE UP
RBC # BLD: 4.05 M/UL — LOW (ref 4.7–6.1)
RBC # FLD: 11.4 % — LOW (ref 11.5–14.5)
RBC CASTS # UR COMP ASSIST: 2 /HPF — SIGNIFICANT CHANGE UP (ref 0–4)
SODIUM SERPL-SCNC: 125 MMOL/L — LOW (ref 135–146)
SODIUM SERPL-SCNC: 132 MMOL/L — LOW (ref 135–146)
SP GR SPEC: 1.01 — SIGNIFICANT CHANGE UP (ref 1–1.03)
UROBILINOGEN FLD QL: 0.2 MG/DL — SIGNIFICANT CHANGE UP (ref 0.2–1)
WBC # BLD: 13.56 K/UL — HIGH (ref 4.8–10.8)
WBC # FLD AUTO: 13.56 K/UL — HIGH (ref 4.8–10.8)
WBC UR QL: 1 /HPF — SIGNIFICANT CHANGE UP (ref 0–5)

## 2024-08-22 PROCEDURE — 85027 COMPLETE CBC AUTOMATED: CPT

## 2024-08-22 PROCEDURE — 84100 ASSAY OF PHOSPHORUS: CPT

## 2024-08-22 PROCEDURE — 36415 COLL VENOUS BLD VENIPUNCTURE: CPT

## 2024-08-22 PROCEDURE — 80048 BASIC METABOLIC PNL TOTAL CA: CPT

## 2024-08-22 PROCEDURE — 83735 ASSAY OF MAGNESIUM: CPT

## 2024-08-22 PROCEDURE — 74177 CT ABD & PELVIS W/CONTRAST: CPT | Mod: 26,MC

## 2024-08-22 PROCEDURE — 74177 CT ABD & PELVIS W/CONTRAST: CPT | Mod: MC

## 2024-08-22 PROCEDURE — 85025 COMPLETE CBC W/AUTO DIFF WBC: CPT

## 2024-08-22 PROCEDURE — 80053 COMPREHEN METABOLIC PANEL: CPT

## 2024-08-22 PROCEDURE — 93010 ELECTROCARDIOGRAM REPORT: CPT

## 2024-08-22 RX ORDER — ONDANSETRON 2 MG/ML
4 INJECTION, SOLUTION INTRAMUSCULAR; INTRAVENOUS EVERY 8 HOURS
Refills: 0 | Status: DISCONTINUED | OUTPATIENT
Start: 2024-08-22 | End: 2024-08-29

## 2024-08-22 RX ORDER — CHLORHEXIDINE GLUCONATE 40 MG/ML
1 SOLUTION TOPICAL
Refills: 0 | Status: DISCONTINUED | OUTPATIENT
Start: 2024-08-22 | End: 2024-08-29

## 2024-08-22 RX ORDER — METRONIDAZOLE 250 MG
500 TABLET ORAL EVERY 12 HOURS
Refills: 0 | Status: DISCONTINUED | OUTPATIENT
Start: 2024-08-22 | End: 2024-08-22

## 2024-08-22 RX ORDER — ERTAPENEM SODIUM 1 G/1
1000 INJECTION, POWDER, LYOPHILIZED, FOR SOLUTION INTRAMUSCULAR; INTRAVENOUS EVERY 24 HOURS
Refills: 0 | Status: DISCONTINUED | OUTPATIENT
Start: 2024-08-22 | End: 2024-08-29

## 2024-08-22 RX ORDER — SODIUM CHLORIDE 9 MG/ML
1000 INJECTION INTRAMUSCULAR; INTRAVENOUS; SUBCUTANEOUS
Refills: 0 | Status: DISCONTINUED | OUTPATIENT
Start: 2024-08-22 | End: 2024-08-22

## 2024-08-22 RX ORDER — ACETAMINOPHEN 325 MG/1
650 TABLET ORAL EVERY 6 HOURS
Refills: 0 | Status: DISCONTINUED | OUTPATIENT
Start: 2024-08-22 | End: 2024-08-29

## 2024-08-22 RX ORDER — PANTOPRAZOLE SODIUM 40 MG
40 TABLET, DELAYED RELEASE (ENTERIC COATED) ORAL EVERY 24 HOURS
Refills: 0 | Status: DISCONTINUED | OUTPATIENT
Start: 2024-08-22 | End: 2024-08-29

## 2024-08-22 RX ORDER — ZOLPIDEM TARTRATE 5 MG/1
5 TABLET, FILM COATED ORAL AT BEDTIME
Refills: 0 | Status: DISCONTINUED | OUTPATIENT
Start: 2024-08-22 | End: 2024-08-29

## 2024-08-22 RX ORDER — MAGNESIUM, ALUMINUM HYDROXIDE 200-225/5
30 SUSPENSION, ORAL (FINAL DOSE FORM) ORAL EVERY 4 HOURS
Refills: 0 | Status: DISCONTINUED | OUTPATIENT
Start: 2024-08-22 | End: 2024-08-29

## 2024-08-22 RX ORDER — ENOXAPARIN SODIUM 100 MG/ML
40 INJECTION SUBCUTANEOUS EVERY 24 HOURS
Refills: 0 | Status: DISCONTINUED | OUTPATIENT
Start: 2024-08-22 | End: 2024-08-29

## 2024-08-22 RX ORDER — METRONIDAZOLE 250 MG
500 TABLET ORAL EVERY 8 HOURS
Refills: 0 | Status: DISCONTINUED | OUTPATIENT
Start: 2024-08-22 | End: 2024-08-22

## 2024-08-22 RX ADMIN — Medication 100 MILLIGRAM(S): at 08:54

## 2024-08-22 RX ADMIN — Medication 200 MILLIGRAM(S): at 08:55

## 2024-08-22 RX ADMIN — Medication 2 MILLIGRAM(S): at 09:30

## 2024-08-22 RX ADMIN — Medication 100 MILLIGRAM(S): at 13:18

## 2024-08-22 RX ADMIN — ZOLPIDEM TARTRATE 5 MILLIGRAM(S): 5 TABLET, FILM COATED ORAL at 04:32

## 2024-08-22 RX ADMIN — Medication 40 MILLIGRAM(S): at 06:19

## 2024-08-22 RX ADMIN — Medication 125 MILLILITER(S): at 20:46

## 2024-08-22 RX ADMIN — SODIUM CHLORIDE 2700 MILLILITER(S): 9 INJECTION INTRAMUSCULAR; INTRAVENOUS; SUBCUTANEOUS at 00:06

## 2024-08-22 RX ADMIN — Medication 200 MILLIGRAM(S): at 01:02

## 2024-08-22 RX ADMIN — Medication 100 MILLIGRAM(S): at 00:09

## 2024-08-22 RX ADMIN — Medication 2 MILLIGRAM(S): at 15:45

## 2024-08-22 RX ADMIN — ERTAPENEM SODIUM 120 MILLIGRAM(S): 1 INJECTION, POWDER, LYOPHILIZED, FOR SOLUTION INTRAMUSCULAR; INTRAVENOUS at 20:52

## 2024-08-22 RX ADMIN — Medication 2 MILLIGRAM(S): at 22:29

## 2024-08-22 RX ADMIN — Medication 2 MILLIGRAM(S): at 19:01

## 2024-08-22 RX ADMIN — Medication 2 MILLIGRAM(S): at 16:11

## 2024-08-22 RX ADMIN — SODIUM CHLORIDE 2700 MILLILITER(S): 9 INJECTION INTRAMUSCULAR; INTRAVENOUS; SUBCUTANEOUS at 02:44

## 2024-08-22 RX ADMIN — Medication 4 MILLIGRAM(S): at 02:55

## 2024-08-22 RX ADMIN — Medication 2 MILLIGRAM(S): at 08:59

## 2024-08-22 NOTE — H&P ADULT - HISTORY OF PRESENT ILLNESS
Patient is a 54yo male w/ pmhx of diverticulitis, surgical hx of b/l inguinal hernia repair 30 years ago presenting w/ LLQ abdominal pain. Patient reports has had intermittent LLQ discomfort for the past two months which got better on its own last week however today afternoon he suddenly developed worsening LLQ abdominal pain associated with nausea but no vomiting. Reports this is the 4th episode of diverticulitis with in the year. Last colonoscopy 01/11 w/ finding of diverticulosis. Reports last BM yesterday afternoon, is passing flatus but less than his baseline. Denies fevers, chills, vomiting, chest pain, SOB, diarrhea, hematochezia, melena, or any urinary sxs.

## 2024-08-22 NOTE — ED PROVIDER NOTE - PROGRESS NOTE DETAILS
SG: Per radiology, perforated diverticulitis noted.  Surgical consult placed.  Surgery recommended patient to be given IV antibiotics, n.p.o., and admitted to medicine with surgery following. Will continue to monitor and reassess.

## 2024-08-22 NOTE — PROGRESS NOTE ADULT - ASSESSMENT
Patient is a 52yo male w/ pmhx of diverticulitis, surgical hx of b/l inguinal hernia repair 30 years ago presenting w/ LLQ abdominal pain.  Reports this is the 4th episode of diverticulitis with in the year    Pt seen by Dr. Bynum:    #Sepsis 2/2 Perforated Sigmoid Diverticulitis   - cont NPO/IVF  - cont Cipro & Flagyl  - F/U ID  - serial abdominal exams  -f/u labs, lactate today  - GI PPX   - Pain control prn   - FU BCx    #Insomnia  - c/w home Lunesta prn for insomnia - zolpidem inpatient

## 2024-08-22 NOTE — H&P ADULT - ASSESSMENT
Patient is a 54yo male w/ pmhx of diverticulitis, surgical hx of b/l inguinal hernia repair 30 years ago presenting w/ LLQ abdominal pain. Patient reports has had intermittent LLQ discomfort for the past two months which got better on its own last week however today afternoon he suddenly developed worsening LLQ abdominal pain associated with nausea but no vomiting. Reports this is the 4th episode of diverticulitis with in the year. Last colonoscopy 01/11 w/ finding of diverticulosis. Reports last BM yesterday afternoon, is passing flatus but less than his baseline. Denies fevers, chills, vomiting, chest pain, SOB, diarrhea, hematochezia, melena, or any urinary sxs.     #Sepsis 2/2 Perforated Sigmoid Diverticulitis   - Temp 99.4, , WBC 18.46, Lactate 2.2, Na 125  - CT AP w/ oral and IV contrast:  Pneumoperitoneum is present. Multiple distal locules of free air noted;   for example, along the liver, left upper quadrant, and left psoas muscle  Perforated sigmoid diverticulitis.  - NPO/IVF  - IV abx - IV Cipro & Flagyl   - GI PPX   - Pain control prn   - Repeat lactate, BMP   - FU BCx  - Tylenol prn for fever  - Trend WBC, Monitor for fevers  - ID Consult     #Insomnia  - c/w home Lunesta prn for insomnia - zolpidem inpatient     Diet: NPO   Activity: AAT   VTE PPX: Lovenox   GI PPX: PPI      Plan Discussed and approved by Dr. Bynum

## 2024-08-22 NOTE — ED PROVIDER NOTE - CARE PLAN
Principal Discharge DX:	Perforated sigmoid colon  Secondary Diagnosis:	Pneumoperitoneum  Secondary Diagnosis:	Sepsis   1

## 2024-08-22 NOTE — H&P ADULT - ATTENDING COMMENTS
above noted discussed case with surgical resident and patient abdomen soft no distension tender LLQ only ct scan and labs noted on IV AB

## 2024-08-22 NOTE — CONSULT NOTE ADULT - ASSESSMENT
ID is consulted for perforated diverticulitis  Afebrile, WBC 18.46  On Room air  Multiple episodes of diverticulitis in this past 6 months  BCx pending    CT A/P 8/22  Perforated sigmoid diverticulitis.  Pneumoperitoneum is present. Multiple distal locules of free air noted;   for example, along the liver, left upper quadrant, and left psoas muscle.    Antibiotics:  Cipro 8/22  Flagyl 8/22      IMPRESSION:  Perforated diverticulitis  Pneumoperitoneum  Clinical peritonitis  Leukocytosis  Drug allergy    RECOMMENDATIONS:  - D/C Cipro and Flagyl  - Start IV ertapenem 1g q24hrs  - On discharge, plan to send home with IV ertapenem x 14 days via midline  - Surgery following  - Offloading and frequent position changes, aspiration precaution  - Trend WBC, fever curve, transaminases, creatinine daily      Beverly Bray D.O.  Attending Physician  Division of Infectious Diseases  [facility]  Please contact me via [employer] Teams

## 2024-08-22 NOTE — H&P ADULT - NSHPPHYSICALEXAM_GEN_ALL_CORE
Vital Signs Last 24 Hrs  T(C): 37.1 (22 Aug 2024 00:45), Max: 37.4 (21 Aug 2024 22:36)  T(F): 98.7 (22 Aug 2024 00:45), Max: 99.4 (21 Aug 2024 22:36)  HR: 102 (22 Aug 2024 03:20) (102 - 122)  BP: 138/71 (22 Aug 2024 03:20) (138/71 - 161/90)  RR: 18 (22 Aug 2024 03:20) (18 - 18)  SpO2: 97% (22 Aug 2024 03:20) (95% - 97%)    Parameters below as of 22 Aug 2024 03:20  Patient On (Oxygen Delivery Method): room air      GENERAL:  54y/o Male NAD, resting comfortably.  EYES: EOMI, conjunctiva and sclera clear  CHEST/LUNG: Clear to auscultation bilaterally; No wheeze, rhonchi, or rales  HEART: Regular rate and rhythm; S1&S2  ABDOMEN: mildly distended, +LLQ ttp, no rebound/guarding   EXTREMITIES:   Peripheral Pulses Present, No clubbing, no cyanosis, or no edema, no calf tenderness  PSYCH: AAOx3, cooperative, appropriate  SKIN: WNL

## 2024-08-22 NOTE — CONSULT NOTE ADULT - SUBJECTIVE AND OBJECTIVE BOX
INFECTIOUS DISEASE CONSULT NOTE    Patient is a 53y old  Male who presents with a chief complaint of   HPI:  Patient is a 52yo male w/ pmhx of diverticulitis, surgical hx of b/l inguinal hernia repair 30 years ago presenting w/ LLQ abdominal pain. Patient reports has had intermittent LLQ discomfort for the past two months which got better on its own last week however today afternoon he suddenly developed worsening LLQ abdominal pain associated with nausea but no vomiting. Reports this is the 4th episode of diverticulitis with in the year. Last colonoscopy  w/ finding of diverticulosis. Reports last BM yesterday afternoon, is passing flatus but less than his baseline. Denies fevers, chills, vomiting, chest pain, SOB, diarrhea, hematochezia, melena, or any urinary sxs.  (22 Aug 2024 03:29)         Prior hospital charts reviewed [Yes]  Primary team notes reviewed [Yes]  Other consultant notes reviewed [Yes]    REVIEW OF SYSTEMS:      PAST MEDICAL & SURGICAL HISTORY:  Insomnia      Diverticulosis          SOCIAL HISTORY:  - Born in [place], migrated [place] in   - Currently working as / Retired  - Lives with _____; no pets  - No recent travel  - Denies tobacco use  - Denies alcohol use  - Denies illicit drug use  - Currently sexually active, has one male/female sexual partner    FAMILY HISTORY:      Allergies:  penicillin (Unknown)      ANTIMICROBIALS:  ciprofloxacin   IVPB 400 every 12 hours  ciprofloxacin   IVPB 400 every 12 hours  ciprofloxacin   IVPB        ANTIMICROBIALS (past 90 days):  MEDICATIONS  (STANDING):    ciprofloxacin   IVPB   200 mL/Hr IV Intermittent (24 @ 08:55)    ciprofloxacin   IVPB   200 mL/Hr IV Intermittent (24 @ 01:02)    metroNIDAZOLE  IVPB   100 mL/Hr IV Intermittent (24 @ 08:54)    metroNIDAZOLE  IVPB   100 mL/Hr IV Intermittent (24 @ 00:09)        OTHER MEDS:   MEDICATIONS  (STANDING):  acetaminophen     Tablet .. 650 every 6 hours PRN  aluminum hydroxide/magnesium hydroxide/simethicone Suspension 30 every 4 hours PRN  enoxaparin Injectable 40 every 24 hours  melatonin 3 at bedtime PRN  morphine  - Injectable 2 every 6 hours PRN  ondansetron Injectable 4 every 8 hours PRN  pantoprazole  Injectable 40 every 24 hours  zolpidem 5 at bedtime PRN      VITALS:  Vital Signs Last 24 Hrs  T(F): 98.7 (24 @ 00:45), Max: 99.4 (24 @ 22:36)    Vital Signs Last 24 Hrs  HR: 102 (24 @ 03:20) (102 - 122)  BP: 138/71 (24 @ 03:20) (138/71 - 161/90)  RR: 18 (24 @ 03:20)  SpO2: 97% (24 @ 03:20) (95% - 97%)  Wt(kg): --    EXAM:    Labs:                        14.3   18.46 )-----------( 340      ( 21 Aug 2024 23:36 )             38.9         125<L>  |  89<L>  |  8<L>  ----------------------------<  131<H>  4.0   |  21  |  0.9    Ca    9.2      21 Aug 2024 23:36    TPro  6.5  /  Alb  4.2  /  TBili  0.9  /  DBili  x   /  AST  26  /  ALT  23  /  AlkPhos  69        WBC Trend:  WBC Count: 18.46 (24 @ 23:36)      Auto Neutrophil #: 16.97 K/uL (24 @ 23:36)  Auto Neutrophil #: 4.29 K/uL (24 @ 12:39)  Auto Neutrophil #: 3.12 K/uL (24 @ 07:45)  Auto Neutrophil #: 11.00 K/uL (24 @ 10:00)  Auto Neutrophil #: 7.36 K/uL (24 @ 06:00)      Creatine Trend:  Creatinine: 0.9 ()      Liver Biochemical Testing Trend:  Alanine Aminotransferase (ALT/SGPT): 23 ()  Alanine Aminotransferase (ALT/SGPT): 33 ()  Alanine Aminotransferase (ALT/SGPT): 24 ()  Alanine Aminotransferase (ALT/SGPT): 13 ()  Alanine Aminotransferase (ALT/SGPT): 14 ()  Aspartate Aminotransferase (AST/SGOT): 26 (24 @ 23:36)  Aspartate Aminotransferase (AST/SGOT): 28 (24 @ 12:39)  Aspartate Aminotransferase (AST/SGOT): 20 (24 @ 10:00)  Aspartate Aminotransferase (AST/SGOT): 14 (24 @ 06:00)  Aspartate Aminotransferase (AST/SGOT): 14 (24 @ 11:53)  Bilirubin Total: 0.9 ()  Bilirubin Total: 0.5 ()  Bilirubin Direct: 0.2 ()  Bilirubin Total: 0.7 ()  Bilirubin Total: 0.5 ()      Trend LDH      Auto Eosinophil %: 0.0 % (24 @ 23:36)      Urinalysis Basic - ( 22 Aug 2024 00:06 )    Color: Orange / Appearance: Clear / S.012 / pH: x  Gluc: x / Ketone: Negative mg/dL  / Bili: Negative / Urobili: 0.2 mg/dL   Blood: x / Protein: Negative mg/dL / Nitrite: Negative   Leuk Esterase: Negative / RBC: 2 /HPF / WBC 1 /HPF   Sq Epi: x / Non Sq Epi: x / Bacteria: Negative /HPF          MICROBIOLOGY:        Urinalysis with Rflx Culture (collected 22 Aug 2024 00:06)      Lactate, Blood: 1.2 ( @ 03:20)  Lactate, Blood: 2.2 ( @ 00:56)      RADIOLOGY:  imaging below personally reviewed   INFECTIOUS DISEASE CONSULT NOTE    Patient is a 53y old  Male who presents with a chief complaint of abdominal pain  HPI:  Patient is a 54yo male w/ pmhx of diverticulitis, surgical hx of b/l inguinal hernia repair 30 years ago presenting w/ LLQ abdominal pain. Patient reports has had intermittent LLQ discomfort for the past two months which got better on its own last week however today afternoon he suddenly developed worsening LLQ abdominal pain associated with nausea but no vomiting. Reports this is the 4th episode of diverticulitis with in the year. Last colonoscopy  w/ finding of diverticulosis. Reports last BM yesterday afternoon, is passing flatus but less than his baseline. Denies fevers, chills, vomiting, chest pain, SOB, diarrhea, hematochezia, melena, or any urinary sxs.  (22 Aug 2024 03:29)     Prior hospital charts reviewed [Yes]  Primary team notes reviewed [Yes]  Other consultant notes reviewed [Yes]    REVIEW OF SYSTEMS:  CONSTITUTIONAL: No fever or chills  HEAD: No lesion on scalp  EYES: No visual disturbance  ENT: No sore throat  RESPIRATORY: No cough, no shortness of breath  CARDIOVASCULAR: No chest pain or palpitations  GASTROINTESTINAL: + diffuse abdominal pain  GENITOURINARY: No dysuria  NEUROLOGICAL: No headache/dizziness  MUSCULOSKELETAL: No joint pain, erythema, or swelling; no back pain  SKIN: No itching, rashes  All other ROS negative except noted above      PAST MEDICAL & SURGICAL HISTORY:  Insomnia      Diverticulosis      SOCIAL HISTORY:  - No recent travel  - Active tobacco use  - Denies alcohol use  - Denies illicit drug use    FAMILY HISTORY:  No family history of diverticulitis    Allergies:  penicillin (Unknown)      ANTIMICROBIALS:  ciprofloxacin   IVPB 400 every 12 hours  ciprofloxacin   IVPB 400 every 12 hours  ciprofloxacin   IVPB        ANTIMICROBIALS (past 90 days):  MEDICATIONS  (STANDING):    ciprofloxacin   IVPB   200 mL/Hr IV Intermittent (24 @ 08:55)    ciprofloxacin   IVPB   200 mL/Hr IV Intermittent (24 @ 01:02)    metroNIDAZOLE  IVPB   100 mL/Hr IV Intermittent (24 @ 08:54)    metroNIDAZOLE  IVPB   100 mL/Hr IV Intermittent (24 @ 00:09)        OTHER MEDS:   MEDICATIONS  (STANDING):  acetaminophen     Tablet .. 650 every 6 hours PRN  aluminum hydroxide/magnesium hydroxide/simethicone Suspension 30 every 4 hours PRN  enoxaparin Injectable 40 every 24 hours  melatonin 3 at bedtime PRN  morphine  - Injectable 2 every 6 hours PRN  ondansetron Injectable 4 every 8 hours PRN  pantoprazole  Injectable 40 every 24 hours  zolpidem 5 at bedtime PRN      VITALS:  Vital Signs Last 24 Hrs  T(F): 98.7 (24 @ 00:45), Max: 99.4 (24 @ 22:36)    Vital Signs Last 24 Hrs  HR: 102 (24 @ 03:20) (102 - 122)  BP: 138/71 (24 @ 03:20) (138/71 - 161/90)  RR: 18 (24 @ 03:20)  SpO2: 97% (24 @ 03:20) (95% - 97%)  Wt(kg): --    EXAM:  GENERAL: NAD, lying in bed  HEAD: No head lesions  EYES: Conjunctiva pink and cornea white  EAR, NOSE, MOUTH, THROAT: Normal external ears and nose, no discharges; moist mucous membranes  NECK: Supple, nontender to palpation; no JVD  RESPIRATORY: Clear to auscultation bilaterally  CARDIOVASCULAR: S1 S2  GASTROINTESTINAL: diffuse abdominal pain; normoactive bowel sounds  GENITOURINARY: No gore catheter, no CVA tenderness  EXTREMITIES: No clubbing, cyanosis, or petal edema  NERVOUS SYSTEM: Alert and oriented to person, time, place and situation, speech clear. No focal deficits   MUSCULOSKELETAL: No joint erythema, swelling or pain  SKIN: No rashes or lesions, no superficial thrombophlebitis  PSYCH: Normal affect      Labs:                        14.3   18.46 )-----------( 340      ( 21 Aug 2024 23:36 )             38.9         125<L>  |  89<L>  |  8<L>  ----------------------------<  131<H>  4.0   |  21  |  0.9    Ca    9.2      21 Aug 2024 23:36    TPro  6.5  /  Alb  4.2  /  TBili  0.9  /  DBili  x   /  AST  26  /  ALT  23  /  AlkPhos  69        WBC Trend:  WBC Count: 18.46 (24 @ 23:36)      Auto Neutrophil #: 16.97 K/uL (24 @ 23:36)  Auto Neutrophil #: 4.29 K/uL (24 @ 12:39)  Auto Neutrophil #: 3.12 K/uL (24 @ 07:45)  Auto Neutrophil #: 11.00 K/uL (24 @ 10:00)  Auto Neutrophil #: 7.36 K/uL (24 @ 06:00)      Creatine Trend:  Creatinine: 0.9 ()      Liver Biochemical Testing Trend:  Alanine Aminotransferase (ALT/SGPT): 23 ()  Alanine Aminotransferase (ALT/SGPT): 33 ()  Alanine Aminotransferase (ALT/SGPT): 24 ()  Alanine Aminotransferase (ALT/SGPT): 13 ()  Alanine Aminotransferase (ALT/SGPT): 14 ()  Aspartate Aminotransferase (AST/SGOT): 26 (24 @ 23:36)  Aspartate Aminotransferase (AST/SGOT): 28 (24 @ 12:39)  Aspartate Aminotransferase (AST/SGOT): 20 (24 @ 10:00)  Aspartate Aminotransferase (AST/SGOT): 14 (24 @ 06:00)  Aspartate Aminotransferase (AST/SGOT): 14 (24 @ 11:53)  Bilirubin Total: 0.9 ()  Bilirubin Total: 0.5 ()  Bilirubin Direct: 0.2 ()  Bilirubin Total: 0.7 ()  Bilirubin Total: 0.5 ()      Trend LDH      Auto Eosinophil %: 0.0 % (24 @ 23:36)      Urinalysis Basic - ( 22 Aug 2024 00:06 )    Color: Orange / Appearance: Clear / S.012 / pH: x  Gluc: x / Ketone: Negative mg/dL  / Bili: Negative / Urobili: 0.2 mg/dL   Blood: x / Protein: Negative mg/dL / Nitrite: Negative   Leuk Esterase: Negative / RBC: 2 /HPF / WBC 1 /HPF   Sq Epi: x / Non Sq Epi: x / Bacteria: Negative /HPF          MICROBIOLOGY:        Urinalysis with Rflx Culture (collected 22 Aug 2024 00:06)      Lactate, Blood: 1.2 ( @ 03:20)  Lactate, Blood: 2.2 ( @ 00:56)      RADIOLOGY:  imaging below personally reviewed    < from: CT Abdomen and Pelvis w/ Oral Cont and w/ IV Cont (24 @ 02:40) >  Perforated sigmoid diverticulitis.    Pneumoperitoneum is present. Multiple distal locules of free air noted;   for example, along the liver, left upper quadrant, and left psoas muscle.    < end of copied text >

## 2024-08-22 NOTE — PROGRESS NOTE ADULT - SUBJECTIVE AND OBJECTIVE BOX
S; Pt with mildly improved abdominal pain now 6/10; pan is still mostly LLQ but also with mild B/L upper abd pain. Denies N/V/F/C/Fl/BM.  Voiding well without dysuria.  O; Vital Signs Last 24 Hrs  T(C): 36.8 (22 Aug 2024 10:22), Max: 37.4 (21 Aug 2024 22:36)  T(F): 98.3 (22 Aug 2024 10:22), Max: 99.4 (21 Aug 2024 22:36)  HR: 90 (22 Aug 2024 10:22) (90 - 122)  BP: 128/69 (22 Aug 2024 10:22) (128/69 - 161/90)  BP(mean): --  RR: 16 (22 Aug 2024 10:22) (16 - 18)  SpO2: 97% (22 Aug 2024 03:20) (95% - 97%)    Parameters below as of 22 Aug 2024 03:20  Patient On (Oxygen Delivery Method): room air    MEDICATIONS  (STANDING):  ciprofloxacin   IVPB 400 milliGRAM(s) IV Intermittent every 12 hours  ciprofloxacin   IVPB 400 milliGRAM(s) IV Intermittent every 12 hours  ciprofloxacin   IVPB      enoxaparin Injectable 40 milliGRAM(s) SubCutaneous every 24 hours  pantoprazole  Injectable 40 milliGRAM(s) IV Push every 24 hours  sodium chloride 0.9%. 1000 milliLiter(s) (125 mL/Hr) IV Continuous <Continuous>    MEDICATIONS  (PRN):  acetaminophen     Tablet .. 650 milliGRAM(s) Oral every 6 hours PRN Temp greater or equal to 38C (100.4F), Mild Pain (1 - 3)  aluminum hydroxide/magnesium hydroxide/simethicone Suspension 30 milliLiter(s) Oral every 4 hours PRN Dyspepsia  melatonin 3 milliGRAM(s) Oral at bedtime PRN Insomnia  morphine  - Injectable 2 milliGRAM(s) IV Push every 6 hours PRN Severe Pain (7 - 10)  ondansetron Injectable 4 milliGRAM(s) IV Push every 8 hours PRN Nausea and/or Vomiting  zolpidem 5 milliGRAM(s) Oral at bedtime PRN Insomnia    EXAM:  GEN: NAD  lungs: cta  cvs: s1s2  abd: soft, mildly distended, mild B/L upper abdominalk pain, +LLQ tenderness w/local guarding    Labs: pending today  CAPILLARY BLOOD GLUCOSE                              14.3   18.46 )-----------( 340      ( 21 Aug 2024 23:36 )             38.9       Auto Immature Granulocyte %: 0.4 % (24 @ 23:36)  Auto Neutrophil %: 91.8 % (24 @ 23:36)        125<L>  |  89<L>  |  8<L>  ----------------------------<  131<H>  4.0   |  21  |  0.9      Calcium: 9.2 mg/dL (24 @ 23:36)      LFTs:             6.5  | 0.9  | 26       ------------------[69      ( 21 Aug 2024 23:36 )  4.2  | x    | 23          Lipase:16     Amylase:x         Lactate, Blood: 1.2 mmol/L (24 @ 03:20)  Lactate, Blood: 2.2 mmol/L (24 @ 00:56)      Coags:        Urinalysis Basic - ( 22 Aug 2024 00:06 )    Color: Orange / Appearance: Clear / S.012 / pH: x  Gluc: x / Ketone: Negative mg/dL  / Bili: Negative / Urobili: 0.2 mg/dL   Blood: x / Protein: Negative mg/dL / Nitrite: Negative   Leuk Esterase: Negative / RBC: 2 /HPF / WBC 1 /HPF   Sq Epi: x / Non Sq Epi: x / Bacteria: Negative /HPF        Urinalysis with Rflx Culture (collected 22 Aug 2024 00:06)

## 2024-08-22 NOTE — H&P ADULT - NSHPLABSRESULTS_GEN_ALL_CORE
14.3   18.46 )-----------( 340      ( 21 Aug 2024 23:36 )             38.9           125<L>  |  89<L>  |  8<L>  ----------------------------<  131<H>  4.0   |  21  |  0.9    Ca    9.2      21 Aug 2024 23:36    TPro  6.5  /  Alb  4.2  /  TBili  0.9  /  DBili  x   /  AST  26  /  ALT  23  /  AlkPhos  69                Urinalysis Basic - ( 22 Aug 2024 00:06 )    Color: Orange / Appearance: Clear / S.012 / pH: x  Gluc: x / Ketone: Negative mg/dL  / Bili: Negative / Urobili: 0.2 mg/dL   Blood: x / Protein: Negative mg/dL / Nitrite: Negative   Leuk Esterase: Negative / RBC: 2 /HPF / WBC 1 /HPF   Sq Epi: x / Non Sq Epi: x / Bacteria: Negative /HPF            Lactate Trend   @ 00:56 Lactate:2.2         < from: CT Abdomen and Pelvis w/ Oral Cont and w/ IV Cont (24 @ 02:40) >    *** ADDENDUM # 1 ***    Please note the following typographical error in the original body of the   report.    Pneumoperitoneum is present. Multiple distal locules of free air noted;   for example, along the liver, left upper quadrant, and left psoas muscle.  Spoke with Dr. Georges Lawrence.    --- End of Report ---    < from: CT Abdomen and Pelvis w/ Oral Cont and w/ IV Cont (24 @ 02:40) >    IMPRESSION:  Perforated sigmoid diverticulitis.    < end of copied text >

## 2024-08-22 NOTE — ED PROVIDER NOTE - OBJECTIVE STATEMENT
Patient is a 53-year-old male presents for evaluation of left lower quadrant abdominal pain moderate throbbing in nature patient has a history of diverticulitis in the past states that this feels similar to worsening abdominal pain consistent with diverticulitis in the past patient denies any fevers chills vomiting or diarrhea though he notes that his nausea denies any dysuria hesitancy or frequency denies any current testicular pain 53-year-old male presents for evaluation of left lower quadrant abdominal pain moderate throbbing in nature patient has a history of diverticulitis in the past states that this feels similar to worsening abdominal pain consistent with diverticulitis in the past. Patient reports that he once had an abscess that was diagnosed only secondary to one of his diverticulitis flares. patient denies any fevers chills vomiting or diarrhea though he notes that his nausea denies any dysuria hesitancy or frequency denies any current testicular pain.

## 2024-08-22 NOTE — CHART NOTE - NSCHARTNOTEFT_GEN_A_CORE
ER spoke with me about admission to medicine.  spoke with ER, radiology, and surgery.  it was deemed and agreed to admit patient to surgical service, since patient has no medical issues as per ER,  further care as per surgery  please contact us with questions or concerns

## 2024-08-22 NOTE — ED PROVIDER NOTE - ATTENDING CONTRIBUTION TO CARE
I have personally performed a history and physical exam on this patient and personally directed the management of the patient. Patient is a 53-year-old male presents for evaluation of left lower quadrant abdominal pain moderate throbbing in nature patient has a history of diverticulitis in the past states that this feels similar to worsening abdominal pain consistent with diverticulitis in the past patient denies any fevers chills vomiting or diarrhea though he notes that his nausea denies any dysuria hesitancy or frequency denies any current testicular pain    On physical exam patient is normocephalic atraumatic pupils equal round react light accommodation extraocular muscles intact oropharynx clear chest clear to auscultation bilaterally abdomen soft tender palpation left lower quadrant no guarding or rebound bowel sounds positive extremities full range of motion no focal deficits noted radial pulse 2+ pedal pulse 2+ no focal deficits noted    Assessment plan patient given IV fluids IV pain medicine we obtain labs considering this patient's history we obtained CT abdomen pelvis with both IV and p.o. contrast given this patient's tachycardia as well as elevation of white blood cell count we initiated sepsis protocols at 2342 patient given IV fluids at 30 cc/kg obtain blood cultures lactate ordered repeat lactate initiated IV antibiotics at that time given suspicion for infection

## 2024-08-22 NOTE — ED PROVIDER NOTE - CLINICAL SUMMARY MEDICAL DECISION MAKING FREE TEXT BOX
Assessment plan patient given IV fluids IV pain medicine we obtain labs considering this patient's history we obtained CT abdomen pelvis with both IV and p.o. contrast given this patient's tachycardia as well as elevation of white blood cell count we initiated sepsis protocols at 2342 patient given IV fluids at 30 cc/kg obtain blood cultures lactate ordered repeat lactate initiated IV antibiotics at that time given suspicion for infection Assessment plan patient given IV fluids IV pain medicine we obtain labs considering this patient's history we obtained CT abdomen pelvis with both IV and p.o. contrast given this patient's tachycardia as well as elevation of white blood cell count we initiated sepsis protocols at 2342 patient given IV fluids at 30 cc/kg obtain blood cultures lactate ordered repeat lactate initiated IV antibiotics at that time given suspicion for infection.

## 2024-08-22 NOTE — ED PROVIDER NOTE - PHYSICAL EXAMINATION
On physical exam patient is normocephalic atraumatic pupils equal round react light accommodation extraocular muscles intact oropharynx clear chest clear to auscultation bilaterally abdomen soft tender palpation left lower quadrant no guarding or rebound bowel sounds positive extremities full range of motion no focal deficits noted radial pulse 2+ pedal pulse 2+ no focal deficits noted

## 2024-08-23 LAB
ANION GAP SERPL CALC-SCNC: 8 MMOL/L — SIGNIFICANT CHANGE UP (ref 7–14)
BUN SERPL-MCNC: 6 MG/DL — LOW (ref 10–20)
CALCIUM SERPL-MCNC: 8.3 MG/DL — LOW (ref 8.4–10.5)
CHLORIDE SERPL-SCNC: 101 MMOL/L — SIGNIFICANT CHANGE UP (ref 98–110)
CO2 SERPL-SCNC: 25 MMOL/L — SIGNIFICANT CHANGE UP (ref 17–32)
CREAT SERPL-MCNC: 1 MG/DL — SIGNIFICANT CHANGE UP (ref 0.7–1.5)
EGFR: 91 ML/MIN/1.73M2 — SIGNIFICANT CHANGE UP
GLUCOSE SERPL-MCNC: 116 MG/DL — HIGH (ref 70–99)
HCT VFR BLD CALC: 33.8 % — LOW (ref 42–52)
HGB BLD-MCNC: 12.2 G/DL — LOW (ref 14–18)
MAGNESIUM SERPL-MCNC: 1.8 MG/DL — SIGNIFICANT CHANGE UP (ref 1.8–2.4)
MCHC RBC-ENTMCNC: 33.4 PG — HIGH (ref 27–31)
MCHC RBC-ENTMCNC: 36.1 G/DL — SIGNIFICANT CHANGE UP (ref 32–37)
MCV RBC AUTO: 92.6 FL — SIGNIFICANT CHANGE UP (ref 80–94)
NRBC # BLD: 0 /100 WBCS — SIGNIFICANT CHANGE UP (ref 0–0)
PHOSPHATE SERPL-MCNC: 2.3 MG/DL — SIGNIFICANT CHANGE UP (ref 2.1–4.9)
PLATELET # BLD AUTO: 281 K/UL — SIGNIFICANT CHANGE UP (ref 130–400)
PMV BLD: 8.4 FL — SIGNIFICANT CHANGE UP (ref 7.4–10.4)
POTASSIUM SERPL-MCNC: 4.1 MMOL/L — SIGNIFICANT CHANGE UP (ref 3.5–5)
POTASSIUM SERPL-SCNC: 4.1 MMOL/L — SIGNIFICANT CHANGE UP (ref 3.5–5)
RBC # BLD: 3.65 M/UL — LOW (ref 4.7–6.1)
RBC # FLD: 11.6 % — SIGNIFICANT CHANGE UP (ref 11.5–14.5)
SODIUM SERPL-SCNC: 134 MMOL/L — LOW (ref 135–146)
WBC # BLD: 9.32 K/UL — SIGNIFICANT CHANGE UP (ref 4.8–10.8)
WBC # FLD AUTO: 9.32 K/UL — SIGNIFICANT CHANGE UP (ref 4.8–10.8)

## 2024-08-23 RX ADMIN — Medication 2 MILLIGRAM(S): at 13:02

## 2024-08-23 RX ADMIN — ERTAPENEM SODIUM 120 MILLIGRAM(S): 1 INJECTION, POWDER, LYOPHILIZED, FOR SOLUTION INTRAMUSCULAR; INTRAVENOUS at 20:57

## 2024-08-23 RX ADMIN — Medication 40 MILLIGRAM(S): at 05:12

## 2024-08-23 RX ADMIN — Medication 2 MILLIGRAM(S): at 22:33

## 2024-08-23 RX ADMIN — Medication 30 MILLILITER(S): at 21:36

## 2024-08-23 RX ADMIN — CHLORHEXIDINE GLUCONATE 1 APPLICATION(S): 40 SOLUTION TOPICAL at 05:12

## 2024-08-23 RX ADMIN — ZOLPIDEM TARTRATE 5 MILLIGRAM(S): 5 TABLET, FILM COATED ORAL at 00:13

## 2024-08-23 RX ADMIN — Medication 2 MILLIGRAM(S): at 01:28

## 2024-08-23 RX ADMIN — Medication 2 MILLIGRAM(S): at 22:45

## 2024-08-23 NOTE — PROGRESS NOTE ADULT - SUBJECTIVE AND OBJECTIVE BOX
HPI: Patient seen and examined at bedside. Patient complains of lower abdominal pain but denies nausea and vomiting. Patient said he felt gurgling and movement in his bowels overnight and reported passing gas. Denies having any bowel movements     PAST MEDICAL & SURGICAL HISTORY:  Insomnia  Diverticulosis    Allergies:  penicillin (Unknown)    MEDICATIONS  (STANDING):  chlorhexidine 4% Liquid 1 Application(s) Topical <User Schedule>  dextrose 5% + sodium chloride 0.9%. 1000 milliLiter(s) (125 mL/Hr) IV Continuous <Continuous>  enoxaparin Injectable 40 milliGRAM(s) SubCutaneous every 24 hours  ertapenem  IVPB 1000 milliGRAM(s) IV Intermittent every 24 hours  pantoprazole  Injectable 40 milliGRAM(s) IV Push every 24 hours    MEDICATIONS  (PRN):  acetaminophen     Tablet .. 650 milliGRAM(s) Oral every 6 hours PRN Temp greater or equal to 38C (100.4F), Mild Pain (1 - 3)  aluminum hydroxide/magnesium hydroxide/simethicone Suspension 30 milliLiter(s) Oral every 4 hours PRN Dyspepsia  melatonin 3 milliGRAM(s) Oral at bedtime PRN Insomnia  morphine  - Injectable 2 milliGRAM(s) IV Push every 3 hours PRN Severe Pain (7 - 10)  ondansetron Injectable 4 milliGRAM(s) IV Push every 8 hours PRN Nausea and/or Vomiting  zolpidem 5 milliGRAM(s) Oral at bedtime PRN Insomnia    ROS:  General: no fever, weight loss,  chills  Skin: no rash, ulcers  Respiratory and Thorax: no cough, wheeze,  sob  Cardiovascular:	no chest pain, palpitations, dizziness  Gastrointestinal:	no nausea, vomiting, diarrhea, + abd pain       Vital Signs Last 24 Hrs:  T(F): 98.8 (23 Aug 2024 05:06), Max: 98.8 (23 Aug 2024 05:06)  HR: 80 (23 Aug 2024 05:06) (80 - 90)  BP: 113/67 (23 Aug 2024 05:06) (113/67 - 152/71)  RR: 18 (23 Aug 2024 05:06) (16 - 18)      PHYSICAL EXAM:  Constitutional: A&Ox4  Respiratory: cta b/l  Cardiovascular: s1 s2 rrr  Gastrointestinal: soft, +tenderness lower quadrants, nd, +bs, no rebound or guarding  Extremities: normal rom, no edema, calf tenderness                            12.2   9.32  )-----------( 281      ( 23 Aug 2024 07:11 )             33.8       08-23    134<L>  |  101  |  6<L>  ----------------------------<  116<H>  4.1   |  25  |  1.0    Ca    8.3<L>      23 Aug 2024 07:11  Phos  2.3     08-23  Mg     1.8     08-23    TPro  6.5  /  Alb  4.2  /  TBili  0.9  /  DBili  x   /  AST  26  /  ALT  23  /  AlkPhos  69  08-21          Urinalysis Basic - ( 23 Aug 2024 07:11 )    Color: x / Appearance: x / SG: x / pH: x  Gluc: 116 mg/dL / Ketone: x  / Bili: x / Urobili: x   Blood: x / Protein: x / Nitrite: x   Leuk Esterase: x / RBC: x / WBC x   Sq Epi: x / Non Sq Epi: x / Bacteria: x

## 2024-08-23 NOTE — PROGRESS NOTE ADULT - SUBJECTIVE AND OBJECTIVE BOX
INFECTIOUS DISEASE FOLLOW UP NOTE:    Interval History/ROS: Patient is a 53y old  Male who presents with a chief complaint of Abdominal pain (22 Aug 2024 09:53)    Overnight events: Improved abdominal pain. No diarrhea/constipation.    REVIEW OF SYSTEMS:  CONSTITUTIONAL: No fever or chills  HEAD: No lesion on scalp  EYES: No visual disturbance  ENT: No sore throat  RESPIRATORY: No cough, no shortness of breath  CARDIOVASCULAR: No chest pain or palpitations  GASTROINTESTINAL: + diffuse abdominal pain  GENITOURINARY: No dysuria  NEUROLOGICAL: No headache/dizziness  MUSCULOSKELETAL: No joint pain, erythema, or swelling; no back pain  SKIN: No itching, rashes  All other ROS negative except noted above      Prior hospital charts reviewed [Yes]  Primary team notes reviewed [Yes]  Other consultant notes reviewed [Yes]    Allergies:  penicillin (Unknown)      ANTIMICROBIALS:   ertapenem  IVPB 1000 every 24 hours      OTHER MEDS: MEDICATIONS  (STANDING):  acetaminophen     Tablet .. 650 every 6 hours PRN  aluminum hydroxide/magnesium hydroxide/simethicone Suspension 30 every 4 hours PRN  enoxaparin Injectable 40 every 24 hours  melatonin 3 at bedtime PRN  morphine  - Injectable 2 every 3 hours PRN  ondansetron Injectable 4 every 8 hours PRN  pantoprazole  Injectable 40 every 24 hours  zolpidem 5 at bedtime PRN      Vital Signs Last 24 Hrs  T(F): 97.1 (08-23-24 @ 13:36), Max: 99.4 (08-21-24 @ 22:36)    Vital Signs Last 24 Hrs  HR: 79 (08-23-24 @ 13:36) (79 - 87)  BP: 126/73 (08-23-24 @ 13:36) (113/67 - 152/71)  RR: 18 (08-23-24 @ 13:36)  SpO2: --  Wt(kg): --    EXAM:  GENERAL: NAD, lying in bed  HEAD: No head lesions  EYES: Conjunctiva pink and cornea white  EAR, NOSE, MOUTH, THROAT: Normal external ears and nose, no discharges; moist mucous membranes  NECK: Supple, nontender to palpation; no JVD  RESPIRATORY: Clear to auscultation bilaterally  CARDIOVASCULAR: S1 S2  GASTROINTESTINAL: diffuse abdominal pain; normoactive bowel sounds  GENITOURINARY: No gore catheter, no CVA tenderness  EXTREMITIES: No clubbing, cyanosis, or petal edema  NERVOUS SYSTEM: Alert and oriented to person, time, place and situation, speech clear. No focal deficits   MUSCULOSKELETAL: No joint erythema, swelling or pain  SKIN: No rashes or lesions, no superficial thrombophlebitis  PSYCH: Normal affect    Labs:                        12.2   9.32  )-----------( 281      ( 23 Aug 2024 07:11 )             33.8     08-23    134<L>  |  101  |  6<L>  ----------------------------<  116<H>  4.1   |  25  |  1.0    Ca    8.3<L>      23 Aug 2024 07:11  Phos  2.3     08-23  Mg     1.8     08-23    TPro  6.5  /  Alb  4.2  /  TBili  0.9  /  DBili  x   /  AST  26  /  ALT  23  /  AlkPhos  69  08-21      WBC Trend:  WBC Count: 9.32 (08-23-24 @ 07:11)  WBC Count: 13.56 (08-22-24 @ 11:28)  WBC Count: 18.46 (08-21-24 @ 23:36)      Creatine Trend:  Creatinine: 1.0 (08-23)  Creatinine: 0.7 (08-22)  Creatinine: 0.9 (08-21)      Liver Biochemical Testing Trend:  Alanine Aminotransferase (ALT/SGPT): 23 (08-21)  Alanine Aminotransferase (ALT/SGPT): 33 (07-05)  Alanine Aminotransferase (ALT/SGPT): 24 (02-16)  Alanine Aminotransferase (ALT/SGPT): 13 (02-12)  Alanine Aminotransferase (ALT/SGPT): 14 (02-11)  Aspartate Aminotransferase (AST/SGOT): 26 (08-21-24 @ 23:36)  Aspartate Aminotransferase (AST/SGOT): 28 (07-05-24 @ 12:39)  Aspartate Aminotransferase (AST/SGOT): 20 (02-16-24 @ 10:00)  Aspartate Aminotransferase (AST/SGOT): 14 (02-12-24 @ 06:00)  Aspartate Aminotransferase (AST/SGOT): 14 (02-11-24 @ 11:53)  Bilirubin Total: 0.9 (08-21)  Bilirubin Total: 0.5 (07-05)  Bilirubin Direct: 0.2 (02-16)  Bilirubin Total: 0.7 (02-16)  Bilirubin Total: 0.5 (02-12)      Trend LDH      Urinalysis Basic - ( 23 Aug 2024 07:11 )    Color: x / Appearance: x / SG: x / pH: x  Gluc: 116 mg/dL / Ketone: x  / Bili: x / Urobili: x   Blood: x / Protein: x / Nitrite: x   Leuk Esterase: x / RBC: x / WBC x   Sq Epi: x / Non Sq Epi: x / Bacteria: x        MICROBIOLOGY:        Urinalysis with Rflx Culture (collected 22 Aug 2024 00:06)    Urinalysis with Rflx Culture (collected 05 Jul 2024 13:48)    Culture - Urine (collected 10 Feb 2024 16:30)  Source: Clean Catch Clean Catch (Midstream)  Final Report:    No growth      Lactate, Blood: 1.2 (08-22 @ 03:20)  Lactate, Blood: 2.2 (08-22 @ 00:56)      RADIOLOGY:  imaging below personally reviewed      < from: CT Abdomen and Pelvis w/ Oral Cont and w/ IV Cont (08.22.24 @ 02:40) >  Perforated sigmoid diverticulitis.    Pneumoperitoneum is present. Multiple distal locules of free air noted;   for example, along the liver, left upper quadrant, and left psoas muscle.    < end of copied text >

## 2024-08-23 NOTE — PROGRESS NOTE ADULT - ASSESSMENT
ID is consulted for perforated diverticulitis  Afebrile, WBC 18.46  On Room air  Multiple episodes of diverticulitis in this past 6 months  BCx pending    CT A/P 8/22  Perforated sigmoid diverticulitis.  Pneumoperitoneum is present. Multiple distal locules of free air noted;   for example, along the liver, left upper quadrant, and left psoas muscle.    Antibiotics:  Cipro 8/22  Flagyl 8/22      IMPRESSION:  Perforated diverticulitis  Pneumoperitoneum  Clinical peritonitis  Leukocytosis  Drug allergy    RECOMMENDATIONS:  - Continue IV ertapenem 1g q24hrs  - On discharge, plan to send home with IV ertapenem x 14 days via midline (until   - Prior stopping abx, repeat CT A/P w contrast  - Surgery following  - Offloading and frequent position changes, aspiration precaution  - Trend WBC, fever curve, transaminases, creatinine daily    - Weekly CBC, CMP  - ID follow-up with Dr. Rickie Watson for Telehealth. We will call the patient between 10:30-1:30      [id number] [facility]       [phone]       Fax [phone]      Beverly Bray D.O.  Attending Physician  Division of Infectious Diseases  [facility]  Please contact me via [employer] Teams

## 2024-08-23 NOTE — PROGRESS NOTE ADULT - ASSESSMENT
Assessment and Plan:   · Assessment	  Patient is a 54yo male w/ pmhx of diverticulitis, surgical hx of b/l inguinal hernia repair 30 years ago presenting w/ LLQ abdominal pain.  Reports this is the 4th episode of diverticulitis with in the year      #Sepsis 2/2 Perforated Sigmoid Diverticulitis   - advance to clears  - Cipro & Flagyl discontinued  - serial abdominal exams  - labs ordered- results reviewed   - GI PPX   - Pain control prn   - blood cultures pending     #Insomnia  - c/w home Lunesta prn for insomnia - zolpidem inpatient Assessment and Plan:   · Assessment	  Patient is a 54yo male w/ pmhx of diverticulitis, surgical hx of b/l inguinal hernia repair 30 years ago presenting w/ LLQ abdominal pain.  Reports this is the 4th episode of diverticulitis with in the year      #Sepsis 2/2 Perforated Sigmoid Diverticulitis   - advance to clears  - Cipro & Flagyl discontinued  - serial abdominal exams  - labs ordered- results reviewed  - STAT lactate normal- 1.2   - GI PPX   - Pain control prn   - blood cultures pending     #Insomnia  - c/w home Lunesta prn for insomnia - zolpidem inpatient Assessment and Plan:   · Assessment	  Patient is a 52yo male w/ pmhx of diverticulitis, surgical hx of b/l inguinal hernia repair 30 years ago presenting w/ LLQ abdominal pain.  Reports this is the 4th episode of diverticulitis with in the year      #Sepsis 2/2 Perforated Sigmoid Diverticulitis   - advance to clears  - Cipro & Flagyl discontinued, started on ertapenem as per ID  - serial abdominal exams  - labs ordered- results reviewed  - GI PPX   - Pain control prn   - blood cultures pending   -lovenox for dvt prophylaxis    #Insomnia  - c/w home Lunesta prn for insomnia - zolpidem inpatient

## 2024-08-24 LAB
ALBUMIN SERPL ELPH-MCNC: 3.6 G/DL — SIGNIFICANT CHANGE UP (ref 3.5–5.2)
ALP SERPL-CCNC: 51 U/L — SIGNIFICANT CHANGE UP (ref 30–115)
ALT FLD-CCNC: 15 U/L — SIGNIFICANT CHANGE UP (ref 0–41)
ANION GAP SERPL CALC-SCNC: 8 MMOL/L — SIGNIFICANT CHANGE UP (ref 7–14)
AST SERPL-CCNC: 15 U/L — SIGNIFICANT CHANGE UP (ref 0–41)
BASOPHILS # BLD AUTO: 0.02 K/UL — SIGNIFICANT CHANGE UP (ref 0–0.2)
BASOPHILS NFR BLD AUTO: 0.3 % — SIGNIFICANT CHANGE UP (ref 0–1)
BILIRUB SERPL-MCNC: 0.7 MG/DL — SIGNIFICANT CHANGE UP (ref 0.2–1.2)
BUN SERPL-MCNC: 4 MG/DL — LOW (ref 10–20)
CALCIUM SERPL-MCNC: 8.5 MG/DL — SIGNIFICANT CHANGE UP (ref 8.4–10.5)
CHLORIDE SERPL-SCNC: 102 MMOL/L — SIGNIFICANT CHANGE UP (ref 98–110)
CO2 SERPL-SCNC: 25 MMOL/L — SIGNIFICANT CHANGE UP (ref 17–32)
CREAT SERPL-MCNC: 0.8 MG/DL — SIGNIFICANT CHANGE UP (ref 0.7–1.5)
EGFR: 106 ML/MIN/1.73M2 — SIGNIFICANT CHANGE UP
EOSINOPHIL # BLD AUTO: 0.14 K/UL — SIGNIFICANT CHANGE UP (ref 0–0.7)
EOSINOPHIL NFR BLD AUTO: 2 % — SIGNIFICANT CHANGE UP (ref 0–8)
GLUCOSE SERPL-MCNC: 118 MG/DL — HIGH (ref 70–99)
HCT VFR BLD CALC: 33 % — LOW (ref 42–52)
HGB BLD-MCNC: 11.4 G/DL — LOW (ref 14–18)
IMM GRANULOCYTES NFR BLD AUTO: 0.1 % — SIGNIFICANT CHANGE UP (ref 0.1–0.3)
LYMPHOCYTES # BLD AUTO: 1.34 K/UL — SIGNIFICANT CHANGE UP (ref 1.2–3.4)
LYMPHOCYTES # BLD AUTO: 19.1 % — LOW (ref 20.5–51.1)
MAGNESIUM SERPL-MCNC: 2 MG/DL — SIGNIFICANT CHANGE UP (ref 1.8–2.4)
MCHC RBC-ENTMCNC: 32.3 PG — HIGH (ref 27–31)
MCHC RBC-ENTMCNC: 34.5 G/DL — SIGNIFICANT CHANGE UP (ref 32–37)
MCV RBC AUTO: 93.5 FL — SIGNIFICANT CHANGE UP (ref 80–94)
MONOCYTES # BLD AUTO: 0.65 K/UL — HIGH (ref 0.1–0.6)
MONOCYTES NFR BLD AUTO: 9.3 % — SIGNIFICANT CHANGE UP (ref 1.7–9.3)
NEUTROPHILS # BLD AUTO: 4.84 K/UL — SIGNIFICANT CHANGE UP (ref 1.4–6.5)
NEUTROPHILS NFR BLD AUTO: 69.2 % — SIGNIFICANT CHANGE UP (ref 42.2–75.2)
NRBC # BLD: 0 /100 WBCS — SIGNIFICANT CHANGE UP (ref 0–0)
PHOSPHATE SERPL-MCNC: 2.4 MG/DL — SIGNIFICANT CHANGE UP (ref 2.1–4.9)
PLATELET # BLD AUTO: 275 K/UL — SIGNIFICANT CHANGE UP (ref 130–400)
PMV BLD: 8.6 FL — SIGNIFICANT CHANGE UP (ref 7.4–10.4)
POTASSIUM SERPL-MCNC: 3.8 MMOL/L — SIGNIFICANT CHANGE UP (ref 3.5–5)
POTASSIUM SERPL-SCNC: 3.8 MMOL/L — SIGNIFICANT CHANGE UP (ref 3.5–5)
PROT SERPL-MCNC: 5.5 G/DL — LOW (ref 6–8)
RBC # BLD: 3.53 M/UL — LOW (ref 4.7–6.1)
RBC # FLD: 11.6 % — SIGNIFICANT CHANGE UP (ref 11.5–14.5)
SODIUM SERPL-SCNC: 135 MMOL/L — SIGNIFICANT CHANGE UP (ref 135–146)
WBC # BLD: 7 K/UL — SIGNIFICANT CHANGE UP (ref 4.8–10.8)
WBC # FLD AUTO: 7 K/UL — SIGNIFICANT CHANGE UP (ref 4.8–10.8)

## 2024-08-24 RX ADMIN — ZOLPIDEM TARTRATE 5 MILLIGRAM(S): 5 TABLET, FILM COATED ORAL at 00:01

## 2024-08-24 RX ADMIN — Medication 40 MILLIGRAM(S): at 05:28

## 2024-08-24 RX ADMIN — Medication 2 MILLIGRAM(S): at 15:59

## 2024-08-24 RX ADMIN — Medication 2 MILLIGRAM(S): at 21:41

## 2024-08-24 RX ADMIN — Medication 2 MILLIGRAM(S): at 22:11

## 2024-08-24 RX ADMIN — Medication 2 MILLIGRAM(S): at 15:55

## 2024-08-24 RX ADMIN — ERTAPENEM SODIUM 120 MILLIGRAM(S): 1 INJECTION, POWDER, LYOPHILIZED, FOR SOLUTION INTRAMUSCULAR; INTRAVENOUS at 21:59

## 2024-08-24 NOTE — PROGRESS NOTE ADULT - ASSESSMENT
Patient is a 52yo male w/ pmhx of diverticulitis, surgical hx of b/l inguinal hernia repair 30 years ago presenting w/ LLQ abdominal pain.  Reports this is the 4th episode of diverticulitis with in the year    D/W Dr. Bynum:    #Sepsis 2/2 Perforated Sigmoid Diverticulitis   - cont to clears  - cont ertapenem x 14 days;   - needs midline & VNS for IV abx administration  - cont serial abdominal exams  -f/u labs  -needs rpt CT A/P w/PO & IV contrast on MOn 8/26 to r/o abscess formation    - GI PPX   - Pain control prn   - blood cultures; NGTD:  f/u final   -lovenox for dvt prophylaxis    #Insomnia  - c/w home Lunesta prn for insomnia - zolpidem inpatient

## 2024-08-24 NOTE — PROGRESS NOTE ADULT - SUBJECTIVE AND OBJECTIVE BOX
S:  Pt still with LLQ pain, 7/10 - unchanged from yeterday but overall feeling better. Feels a little more bloated today; passed a lot of flatus yesterday, only a little today. No BM yet. Tolerating clear liquids. Afebrile  O; Vital Signs Last 24 Hrs  T(C): 37.1 (24 Aug 2024 13:44), Max: 37.1 (24 Aug 2024 13:44)  T(F): 98.8 (24 Aug 2024 13:44), Max: 98.8 (24 Aug 2024 13:44)  HR: 69 (24 Aug 2024 13:44) (69 - 81)  BP: 118/78 (24 Aug 2024 13:44) (100/61 - 151/74)  BP(mean): --  RR: 18 (24 Aug 2024 05:02) (18 - 18)  SpO2: 96% (24 Aug 2024 05:02) (96% - 97%)    Parameters below as of 24 Aug 2024 05:02  Patient On (Oxygen Delivery Method): room air    MEDICATIONS  (STANDING):  chlorhexidine 4% Liquid 1 Application(s) Topical <User Schedule>  dextrose 5% + sodium chloride 0.9%. 1000 milliLiter(s) (125 mL/Hr) IV Continuous <Continuous>  enoxaparin Injectable 40 milliGRAM(s) SubCutaneous every 24 hours  ertapenem  IVPB 1000 milliGRAM(s) IV Intermittent every 24 hours  pantoprazole  Injectable 40 milliGRAM(s) IV Push every 24 hours    MEDICATIONS  (PRN):  acetaminophen     Tablet .. 650 milliGRAM(s) Oral every 6 hours PRN Temp greater or equal to 38C (100.4F), Mild Pain (1 - 3)  aluminum hydroxide/magnesium hydroxide/simethicone Suspension 30 milliLiter(s) Oral every 4 hours PRN Dyspepsia  melatonin 3 milliGRAM(s) Oral at bedtime PRN Insomnia  morphine  - Injectable 2 milliGRAM(s) IV Push every 3 hours PRN Severe Pain (7 - 10)  ondansetron Injectable 4 milliGRAM(s) IV Push every 8 hours PRN Nausea and/or Vomiting  zolpidem 5 milliGRAM(s) Oral at bedtime PRN Insomnia    EXAM:  GEN: well appearing  lungs: cta  CVS: S1S2  ABD: soft, +BS, mildly distended, +mild LLQ/suprapubic tenderness  ext: no LE edema noted    Labs:  CAPILLARY BLOOD GLUCOSE                              11.4   7.00  )-----------( 275      ( 24 Aug 2024 06:22 )             33.0       Auto Neutrophil %: 69.2 % (08-24-24 @ 06:22)  Auto Immature Granulocyte %: 0.1 % (08-24-24 @ 06:22)    08-24    135  |  102  |  4<L>  ----------------------------<  118<H>  3.8   |  25  |  0.8      Calcium: 8.5 mg/dL (08-24-24 @ 06:22)      LFTs:             5.5  | 0.7  | 15       ------------------[51      ( 24 Aug 2024 06:22 )  3.6  | x    | 15          Lipase:x      Amylase:x         Lactate, Blood: 1.2 mmol/L (08-22-24 @ 03:20)  Lactate, Blood: 2.2 mmol/L (08-22-24 @ 00:56)      Coags:        Urinalysis Basic - ( 24 Aug 2024 06:22 )    Color: x / Appearance: x / SG: x / pH: x  Gluc: 118 mg/dL / Ketone: x  / Bili: x / Urobili: x   Blood: x / Protein: x / Nitrite: x   Leuk Esterase: x / RBC: x / WBC x   Sq Epi: x / Non Sq Epi: x / Bacteria: x        Urinalysis with Rflx Culture (collected 22 Aug 2024 00:06)    Culture - Blood (collected 22 Aug 2024 00:06)  Source: Blood Blood-Peripheral  Preliminary Report (23 Aug 2024 22:01):    No growth at 24 hours    Culture - Blood (collected 22 Aug 2024 00:06)  Source: Blood Blood-Peripheral  Preliminary Report (23 Aug 2024 22:01):    No growth at 24 hours

## 2024-08-25 LAB
ANION GAP SERPL CALC-SCNC: 11 MMOL/L — SIGNIFICANT CHANGE UP (ref 7–14)
BUN SERPL-MCNC: 6 MG/DL — LOW (ref 10–20)
CALCIUM SERPL-MCNC: 8.8 MG/DL — SIGNIFICANT CHANGE UP (ref 8.4–10.5)
CHLORIDE SERPL-SCNC: 100 MMOL/L — SIGNIFICANT CHANGE UP (ref 98–110)
CO2 SERPL-SCNC: 26 MMOL/L — SIGNIFICANT CHANGE UP (ref 17–32)
CREAT SERPL-MCNC: 0.9 MG/DL — SIGNIFICANT CHANGE UP (ref 0.7–1.5)
EGFR: 103 ML/MIN/1.73M2 — SIGNIFICANT CHANGE UP
GLUCOSE SERPL-MCNC: 98 MG/DL — SIGNIFICANT CHANGE UP (ref 70–99)
HCT VFR BLD CALC: 36.8 % — LOW (ref 42–52)
HGB BLD-MCNC: 13.5 G/DL — LOW (ref 14–18)
MAGNESIUM SERPL-MCNC: 1.9 MG/DL — SIGNIFICANT CHANGE UP (ref 1.8–2.4)
MCHC RBC-ENTMCNC: 33.3 PG — HIGH (ref 27–31)
MCHC RBC-ENTMCNC: 36.7 G/DL — SIGNIFICANT CHANGE UP (ref 32–37)
MCV RBC AUTO: 90.6 FL — SIGNIFICANT CHANGE UP (ref 80–94)
NRBC # BLD: 0 /100 WBCS — SIGNIFICANT CHANGE UP (ref 0–0)
PHOSPHATE SERPL-MCNC: 3 MG/DL — SIGNIFICANT CHANGE UP (ref 2.1–4.9)
PLATELET # BLD AUTO: 364 K/UL — SIGNIFICANT CHANGE UP (ref 130–400)
PMV BLD: 8.2 FL — SIGNIFICANT CHANGE UP (ref 7.4–10.4)
POTASSIUM SERPL-MCNC: 4.6 MMOL/L — SIGNIFICANT CHANGE UP (ref 3.5–5)
POTASSIUM SERPL-SCNC: 4.6 MMOL/L — SIGNIFICANT CHANGE UP (ref 3.5–5)
RBC # BLD: 4.06 M/UL — LOW (ref 4.7–6.1)
RBC # FLD: 11.3 % — LOW (ref 11.5–14.5)
SODIUM SERPL-SCNC: 137 MMOL/L — SIGNIFICANT CHANGE UP (ref 135–146)
WBC # BLD: 5.49 K/UL — SIGNIFICANT CHANGE UP (ref 4.8–10.8)
WBC # FLD AUTO: 5.49 K/UL — SIGNIFICANT CHANGE UP (ref 4.8–10.8)

## 2024-08-25 RX ADMIN — Medication 2 MILLIGRAM(S): at 01:01

## 2024-08-25 RX ADMIN — ZOLPIDEM TARTRATE 5 MILLIGRAM(S): 5 TABLET, FILM COATED ORAL at 00:35

## 2024-08-25 RX ADMIN — ZOLPIDEM TARTRATE 5 MILLIGRAM(S): 5 TABLET, FILM COATED ORAL at 23:54

## 2024-08-25 RX ADMIN — ERTAPENEM SODIUM 120 MILLIGRAM(S): 1 INJECTION, POWDER, LYOPHILIZED, FOR SOLUTION INTRAMUSCULAR; INTRAVENOUS at 20:45

## 2024-08-25 RX ADMIN — Medication 2 MILLIGRAM(S): at 01:31

## 2024-08-25 RX ADMIN — Medication 40 MILLIGRAM(S): at 05:23

## 2024-08-25 NOTE — PROGRESS NOTE ADULT - SUBJECTIVE AND OBJECTIVE BOX
S; feeling well, LLQ slowly improving; tolerating clears, had solid BM yesterday, passing flatus. Afebrile  O; Vital Signs Last 24 Hrs  T(C): 36.7 (25 Aug 2024 14:00), Max: 36.7 (24 Aug 2024 21:00)  T(F): 98 (25 Aug 2024 14:00), Max: 98.1 (24 Aug 2024 21:00)  HR: 71 (25 Aug 2024 14:00) (63 - 87)  BP: 126/76 (25 Aug 2024 14:00) (109/58 - 138/82)  BP(mean): --  RR: 18 (25 Aug 2024 14:00) (18 - 18)  SpO2: 97% (25 Aug 2024 05:00) (97% - 97%)    Parameters below as of 25 Aug 2024 05:00  Patient On (Oxygen Delivery Method): room air    MEDICATIONS  (STANDING):  chlorhexidine 4% Liquid 1 Application(s) Topical <User Schedule>  dextrose 5% + sodium chloride 0.9%. 1000 milliLiter(s) (125 mL/Hr) IV Continuous <Continuous>  enoxaparin Injectable 40 milliGRAM(s) SubCutaneous every 24 hours  ertapenem  IVPB 1000 milliGRAM(s) IV Intermittent every 24 hours  pantoprazole  Injectable 40 milliGRAM(s) IV Push every 24 hours    MEDICATIONS  (PRN):  acetaminophen     Tablet .. 650 milliGRAM(s) Oral every 6 hours PRN Temp greater or equal to 38C (100.4F), Mild Pain (1 - 3)  aluminum hydroxide/magnesium hydroxide/simethicone Suspension 30 milliLiter(s) Oral every 4 hours PRN Dyspepsia  melatonin 3 milliGRAM(s) Oral at bedtime PRN Insomnia  morphine  - Injectable 2 milliGRAM(s) IV Push every 3 hours PRN Severe Pain (7 - 10)  ondansetron Injectable 4 milliGRAM(s) IV Push every 8 hours PRN Nausea and/or Vomiting  zolpidem 5 milliGRAM(s) Oral at bedtime PRN Insomnia    EXAM:  GEN: well appearing  lungs: cta  cvS: 1s2  abd: soft, mild LLQ tenderness  ext: no LE edema appreciated    Labs:  CAPILLARY BLOOD GLUCOSE                              13.5   5.49  )-----------( 364      ( 25 Aug 2024 12:10 )             36.8         08-25    137  |  100  |  6<L>  ----------------------------<  98  4.6   |  26  |  0.9      Calcium: 8.8 mg/dL (08-25-24 @ 12:10)      LFTs:             5.5  | 0.7  | 15       ------------------[51      ( 24 Aug 2024 06:22 )  3.6  | x    | 15          Lipase:x      Amylase:x             Coags:        Urinalysis Basic - ( 25 Aug 2024 12:10 )    Color: x / Appearance: x / SG: x / pH: x  Gluc: 98 mg/dL / Ketone: x  / Bili: x / Urobili: x   Blood: x / Protein: x / Nitrite: x   Leuk Esterase: x / RBC: x / WBC x   Sq Epi: x / Non Sq Epi: x / Bacteria: x        RECENT CULTURES:  08-22 @ 00:06 .Blood Blood-Peripheral     No growth at 48 Hours     RESPIRATORY CULTURES:   OTHER:

## 2024-08-25 NOTE — PROGRESS NOTE ADULT - ASSESSMENT
Patient is a 54yo male w/ pmhx of diverticulitis, surgical hx of b/l inguinal hernia repair 30 years ago presenting w/ LLQ abdominal pain.  Reports this is the 4th episode of diverticulitis with in the year      #Sepsis 2/2 Perforated Sigmoid Diverticulitis   - cont clears  - oleksandr obtain CT A/P w PO & IV contrast tomorrow to eval for abscess  - Per ID: ertapenem x 14 days (started on 8/22)  - needs midline  - GI PPX   - Pain control prn   - blood cultures: neg   -lovenox for dvt prophylaxis    #Insomnia  - c/w home Lunesta prn for insomnia - zolpidem inpatient

## 2024-08-26 LAB
ANION GAP SERPL CALC-SCNC: 12 MMOL/L — SIGNIFICANT CHANGE UP (ref 7–14)
BUN SERPL-MCNC: 6 MG/DL — LOW (ref 10–20)
CALCIUM SERPL-MCNC: 8.7 MG/DL — SIGNIFICANT CHANGE UP (ref 8.4–10.5)
CHLORIDE SERPL-SCNC: 100 MMOL/L — SIGNIFICANT CHANGE UP (ref 98–110)
CO2 SERPL-SCNC: 26 MMOL/L — SIGNIFICANT CHANGE UP (ref 17–32)
CREAT SERPL-MCNC: 0.9 MG/DL — SIGNIFICANT CHANGE UP (ref 0.7–1.5)
EGFR: 103 ML/MIN/1.73M2 — SIGNIFICANT CHANGE UP
GLUCOSE SERPL-MCNC: 88 MG/DL — SIGNIFICANT CHANGE UP (ref 70–99)
HCT VFR BLD CALC: 35 % — LOW (ref 42–52)
HGB BLD-MCNC: 12.7 G/DL — LOW (ref 14–18)
MAGNESIUM SERPL-MCNC: 2 MG/DL — SIGNIFICANT CHANGE UP (ref 1.8–2.4)
MCHC RBC-ENTMCNC: 33.4 PG — HIGH (ref 27–31)
MCHC RBC-ENTMCNC: 36.3 G/DL — SIGNIFICANT CHANGE UP (ref 32–37)
MCV RBC AUTO: 92.1 FL — SIGNIFICANT CHANGE UP (ref 80–94)
NRBC # BLD: 0 /100 WBCS — SIGNIFICANT CHANGE UP (ref 0–0)
PHOSPHATE SERPL-MCNC: 4.2 MG/DL — SIGNIFICANT CHANGE UP (ref 2.1–4.9)
PLATELET # BLD AUTO: 414 K/UL — HIGH (ref 130–400)
PMV BLD: 8.8 FL — SIGNIFICANT CHANGE UP (ref 7.4–10.4)
POTASSIUM SERPL-MCNC: 3.8 MMOL/L — SIGNIFICANT CHANGE UP (ref 3.5–5)
POTASSIUM SERPL-SCNC: 3.8 MMOL/L — SIGNIFICANT CHANGE UP (ref 3.5–5)
RBC # BLD: 3.8 M/UL — LOW (ref 4.7–6.1)
RBC # FLD: 11.4 % — LOW (ref 11.5–14.5)
SODIUM SERPL-SCNC: 138 MMOL/L — SIGNIFICANT CHANGE UP (ref 135–146)
WBC # BLD: 5.54 K/UL — SIGNIFICANT CHANGE UP (ref 4.8–10.8)
WBC # FLD AUTO: 5.54 K/UL — SIGNIFICANT CHANGE UP (ref 4.8–10.8)

## 2024-08-26 PROCEDURE — 74177 CT ABD & PELVIS W/CONTRAST: CPT | Mod: 26

## 2024-08-26 RX ORDER — IOHEXOL 350 MG/ML
30 INJECTION, SOLUTION INTRAVENOUS ONCE
Refills: 0 | Status: COMPLETED | OUTPATIENT
Start: 2024-08-26 | End: 2024-08-26

## 2024-08-26 RX ADMIN — ERTAPENEM SODIUM 120 MILLIGRAM(S): 1 INJECTION, POWDER, LYOPHILIZED, FOR SOLUTION INTRAMUSCULAR; INTRAVENOUS at 21:03

## 2024-08-26 RX ADMIN — IOHEXOL 30 MILLILITER(S): 350 INJECTION, SOLUTION INTRAVENOUS at 08:16

## 2024-08-26 NOTE — PROGRESS NOTE ADULT - SUBJECTIVE AND OBJECTIVE BOX
INFECTIOUS DISEASE FOLLOW UP NOTE:    Interval History/ROS: Patient is a 53y old  Male who presents with a chief complaint of Abdominal pain (23 Aug 2024 19:18)      Overnight events: No overnight event. Slightly improving abdominal pain.    REVIEW OF SYSTEMS:  CONSTITUTIONAL: No fever or chills  HEAD: No lesion on scalp  EYES: No visual disturbance  ENT: No sore throat  RESPIRATORY: No cough, no shortness of breath  CARDIOVASCULAR: No chest pain or palpitations  GASTROINTESTINAL: + improved abdominal pain  GENITOURINARY: No dysuria  NEUROLOGICAL: No headache/dizziness  MUSCULOSKELETAL: No joint pain, erythema, or swelling; no back pain  SKIN: No itching, rashes  All other ROS negative except noted above      Prior hospital charts reviewed [Yes]  Primary team notes reviewed [Yes]  Other consultant notes reviewed [Yes]    Allergies:  penicillin (Unknown)      ANTIMICROBIALS:   ertapenem  IVPB 1000 every 24 hours      OTHER MEDS: MEDICATIONS  (STANDING):  acetaminophen     Tablet .. 650 every 6 hours PRN  aluminum hydroxide/magnesium hydroxide/simethicone Suspension 30 every 4 hours PRN  enoxaparin Injectable 40 every 24 hours  melatonin 3 at bedtime PRN  morphine  - Injectable 2 every 3 hours PRN  ondansetron Injectable 4 every 8 hours PRN  pantoprazole  Injectable 40 every 24 hours  zolpidem 5 at bedtime PRN      Vital Signs Last 24 Hrs  T(F): 98.3 (08-26-24 @ 14:00), Max: 99.4 (08-21-24 @ 22:36)    Vital Signs Last 24 Hrs  HR: 76 (08-26-24 @ 14:00) (65 - 76)  BP: 120/77 (08-26-24 @ 14:00) (120/77 - 129/64)  RR: 18 (08-26-24 @ 14:00)  SpO2: 97% (08-26-24 @ 05:00) (97% - 97%)  Wt(kg): --    EXAM:  GENERAL: NAD, lying in bed  HEAD: No head lesions  EYES: Conjunctiva pink and cornea white  EAR, NOSE, MOUTH, THROAT: Normal external ears and nose, no discharges; moist mucous membranes  NECK: Supple, nontender to palpation; no JVD  RESPIRATORY: Clear to auscultation bilaterally  CARDIOVASCULAR: S1 S2  GASTROINTESTINAL: diffuse abdominal pain; normoactive bowel sounds  GENITOURINARY: No gore catheter, no CVA tenderness  EXTREMITIES: No clubbing, cyanosis, or petal edema  NERVOUS SYSTEM: Alert and oriented to person, time, place and situation, speech clear. No focal deficits   MUSCULOSKELETAL: No joint erythema, swelling or pain  SKIN: No rashes or lesions, no superficial thrombophlebitis  PSYCH: Normal affect    Labs:                        12.7   5.54  )-----------( 414      ( 26 Aug 2024 05:34 )             35.0     08-26    138  |  100  |  6<L>  ----------------------------<  88  3.8   |  26  |  0.9    Ca    8.7      26 Aug 2024 05:34  Phos  4.2     08-26  Mg     2.0     08-26        WBC Trend:  WBC Count: 5.54 (08-26-24 @ 05:34)  WBC Count: 5.49 (08-25-24 @ 12:10)  WBC Count: 7.00 (08-24-24 @ 06:22)  WBC Count: 9.32 (08-23-24 @ 07:11)      Creatine Trend:  Creatinine: 0.9 (08-26)  Creatinine: 0.9 (08-25)  Creatinine: 0.8 (08-24)  Creatinine: 1.0 (08-23)      Liver Biochemical Testing Trend:  Alanine Aminotransferase (ALT/SGPT): 15 (08-24)  Alanine Aminotransferase (ALT/SGPT): 23 (08-21)  Alanine Aminotransferase (ALT/SGPT): 33 (07-05)  Alanine Aminotransferase (ALT/SGPT): 24 (02-16)  Alanine Aminotransferase (ALT/SGPT): 13 (02-12)  Aspartate Aminotransferase (AST/SGOT): 15 (08-24-24 @ 06:22)  Aspartate Aminotransferase (AST/SGOT): 26 (08-21-24 @ 23:36)  Aspartate Aminotransferase (AST/SGOT): 28 (07-05-24 @ 12:39)  Aspartate Aminotransferase (AST/SGOT): 20 (02-16-24 @ 10:00)  Aspartate Aminotransferase (AST/SGOT): 14 (02-12-24 @ 06:00)  Bilirubin Total: 0.7 (08-24)  Bilirubin Total: 0.9 (08-21)  Bilirubin Total: 0.5 (07-05)  Bilirubin Direct: 0.2 (02-16)  Bilirubin Total: 0.7 (02-16)      Trend LDH      Urinalysis Basic - ( 26 Aug 2024 05:34 )    Color: x / Appearance: x / SG: x / pH: x  Gluc: 88 mg/dL / Ketone: x  / Bili: x / Urobili: x   Blood: x / Protein: x / Nitrite: x   Leuk Esterase: x / RBC: x / WBC x   Sq Epi: x / Non Sq Epi: x / Bacteria: x        MICROBIOLOGY:        Urinalysis with Rflx Culture (collected 22 Aug 2024 00:06)    Culture - Blood (collected 22 Aug 2024 00:06)  Source: .Blood Blood-Peripheral  Preliminary Report:    No growth at 72 Hours    Culture - Blood (collected 22 Aug 2024 00:06)  Source: .Blood Blood-Peripheral  Preliminary Report:    No growth at 72 Hours    Urinalysis with Rflx Culture (collected 05 Jul 2024 13:48)    Culture - Urine (collected 10 Feb 2024 16:30)  Source: Clean Catch Clean Catch (Midstream)  Final Report:    No growth        RADIOLOGY:  imaging below personally reviewed        < from: CT Abdomen and Pelvis w/ Oral Cont and w/ IV Cont (08.22.24 @ 02:40) >  Perforated sigmoid diverticulitis.    Pneumoperitoneum is present. Multiple distal locules of free air noted;   for example, along the liver, left upper quadrant, and left psoas muscle.    < end of copied text >

## 2024-08-26 NOTE — MEDICAL STUDENT PROGRESS NOTE(EDUCATION) - ASSESSMENT
Assessment and Plan:   · Assessment	  Patient is a 54yo male w/ pmhx of diverticulitis, surgical hx of b/l inguinal hernia repair 30 years ago presenting w/ LLQ abdominal pain.  Reports this is the 4th episode of diverticulitis with in the year      #Sepsis 2/2 Perforated Sigmoid Diverticulitis   - cont clears  - wilkl obtain CT A/P w PO & IV contrast to eval for abscess results pending   - Per ID: ertapenem x 14 days (started on 8/22)  - needs midline  - GI PPX   - Pain control prn   - blood cultures: neg   -lovenox for dvt prophylaxis    #Insomnia  - c/w home Lunesta prn for insomnia - zolpidem inpatient

## 2024-08-26 NOTE — PROGRESS NOTE ADULT - ASSESSMENT
52yo male w/ pmhx of diverticulitis, surgical hx of b/l inguinal hernia repair 30 years ago presenting w/ LLQ abdominal pain.    ID is following for perforated diverticulitis  Afebrile, WBC 18.46 > 5.54  On Room air  Multiple episodes of diverticulitis in this past 6 months  BCx pending    CT A/P 8/22  Perforated sigmoid diverticulitis.  Pneumoperitoneum is present. Multiple distal locules of free air noted;   for example, along the liver, left upper quadrant, and left psoas muscle.    Antibiotics:  Cipro 8/22 - 8/23  Flagyl 8/22 - 8/23  Ertapenem 8/23 ->      IMPRESSION:  Perforated diverticulitis  Pneumoperitoneum  Drug allergy    RECOMMENDATIONS:  - Continue IV ertapenem 1g q24hrs  - On discharge, plan to send home with IV ertapenem x 14 days via midline (until 9/5)  - Prior stopping abx, repeat CT A/P w contrast  - Surgery following  - Offloading and frequent position changes, aspiration precaution  - Trend WBC, fever curve, transaminases, creatinine daily    - Weekly CBC, CMP  - ID follow-up with Dr. Rickie Watson for Telehealth. We will call the patient between 10:30-1:30      [id number]        [phone]       Fax [phone]      Beverly Bray D.O.  Attending Physician  Division of Infectious Diseases  [facility]  Please contact me via Microsoft Teams

## 2024-08-26 NOTE — PROGRESS NOTE ADULT - SUBJECTIVE AND OBJECTIVE BOX
Patient seen & examined.  No acute events noted overnight.  Patient tolerates diet well.  * Flatus, * BM.  Patient denies subjective fever, chills, tremors, N/V/D, CP or SOB.     Giancarlo:  BRIANAT:  SAUL:    I&O's Detail      MEDICATIONS  (STANDING):  chlorhexidine 4% Liquid 1 Application(s) Topical <User Schedule>  dextrose 5% + sodium chloride 0.9%. 1000 milliLiter(s) (125 mL/Hr) IV Continuous <Continuous>  enoxaparin Injectable 40 milliGRAM(s) SubCutaneous every 24 hours  ertapenem  IVPB 1000 milliGRAM(s) IV Intermittent every 24 hours  pantoprazole  Injectable 40 milliGRAM(s) IV Push every 24 hours    MEDICATIONS  (PRN):  acetaminophen     Tablet .. 650 milliGRAM(s) Oral every 6 hours PRN Temp greater or equal to 38C (100.4F), Mild Pain (1 - 3)  aluminum hydroxide/magnesium hydroxide/simethicone Suspension 30 milliLiter(s) Oral every 4 hours PRN Dyspepsia  melatonin 3 milliGRAM(s) Oral at bedtime PRN Insomnia  morphine  - Injectable 2 milliGRAM(s) IV Push every 3 hours PRN Severe Pain (7 - 10)  ondansetron Injectable 4 milliGRAM(s) IV Push every 8 hours PRN Nausea and/or Vomiting  zolpidem 5 milliGRAM(s) Oral at bedtime PRN Insomnia        Vital Signs Last 24 Hrs  T(C): 36.3 (26 Aug 2024 21:25), Max: 37.1 (26 Aug 2024 05:00)  T(F): 97.4 (26 Aug 2024 21:25), Max: 98.7 (26 Aug 2024 05:00)  HR: 72 (26 Aug 2024 21:25) (65 - 76)  BP: 122/75 (26 Aug 2024 21:25) (120/77 - 129/64)  BP(mean): --  RR: 18 (26 Aug 2024 21:25) (18 - 18)  SpO2: 98% (26 Aug 2024 21:25) (97% - 98%)    Parameters below as of 26 Aug 2024 21:25  Patient On (Oxygen Delivery Method): room air          Physical Exam:  General:  WD, WN, conversant in NAD.   Neckle: supple  Chest:  CTA BL  CV:  S1 & S2  Abdomen:  + Bowel sounds, soft, no distention.  Non-tender,  no rebound/guarding or peritoneal signs.    Extremities:  No LE swelling, no calf ttp        LABS:                        12.7   5.54  )-----------( 414      ( 26 Aug 2024 05:34 )             35.0     08-26    138  |  100  |  6<L>  ----------------------------<  88  3.8   |  26  |  0.9    Ca    8.7      26 Aug 2024 05:34  Phos  4.2     08-26  Mg     2.0     08-26        Urinalysis Basic - ( 26 Aug 2024 05:34 )    Color: x / Appearance: x / SG: x / pH: x  Gluc: 88 mg/dL / Ketone: x  / Bili: x / Urobili: x   Blood: x / Protein: x / Nitrite: x   Leuk Esterase: x / RBC: x / WBC x   Sq Epi: x / Non Sq Epi: x / Bacteria: x      .        Radiology: imaging reviewed

## 2024-08-26 NOTE — MEDICAL STUDENT PROGRESS NOTE(EDUCATION) - SUBJECTIVE AND OBJECTIVE BOX
HPI:    PAST MEDICAL & SURGICAL HISTORY:  Insomnia    Diverticulosis        Allergies    penicillin (Unknown)    Intolerances        MEDICATIONS  (STANDING):  chlorhexidine 4% Liquid 1 Application(s) Topical <User Schedule>  dextrose 5% + sodium chloride 0.9%. 1000 milliLiter(s) (125 mL/Hr) IV Continuous <Continuous>  enoxaparin Injectable 40 milliGRAM(s) SubCutaneous every 24 hours  ertapenem  IVPB 1000 milliGRAM(s) IV Intermittent every 24 hours  pantoprazole  Injectable 40 milliGRAM(s) IV Push every 24 hours    MEDICATIONS  (PRN):   acetaminophen     Tablet .. 650 milliGRAM(s) Oral every 6 hours PRN Temp greater or equal to 38C (100.4F), Mild Pain (1 - 3)  aluminum hydroxide/magnesium hydroxide/simethicone Suspension 30 milliLiter(s) Oral every 4 hours PRN Dyspepsia  melatonin 3 milliGRAM(s) Oral at bedtime PRN Insomnia  morphine  - Injectable 2 milliGRAM(s) IV Push every 3 hours PRN Severe Pain (7 - 10)  ondansetron Injectable 4 milliGRAM(s) IV Push every 8 hours PRN Nausea and/or Vomiting  zolpidem 5 milliGRAM(s) Oral at bedtime PRN Insomnia      General:	no fever, weight loss,  chills  Skin: no rash, ulcers  Ophthalmologic: no visual changes  ENMT:	no sore throat  Respiratory and Thorax: no cough, wheeze,  sob  Cardiovascular:	no chest pain, palpitations, dizziness  Gastrointestinal:	no nausea, vomiting, diarrhea, abd pain  Genitourinary:	no dysuria, hematuria  Musculoskeletal:	no joint pains  Neurological:	 no speech disturbance, focal weakness, numbness  Psychiatric:	no depression, anxiety, psychosis  Hematology/Lymphatics:	no anemia  Endocrine:	no polyuria, polydipsia        Vital Signs Last 24 Hrs  T(C): 36.8 (26 Aug 2024 14:00), Max: 37.1 (26 Aug 2024 05:00)  T(F): 98.3 (26 Aug 2024 14:00), Max: 98.7 (26 Aug 2024 05:00)  HR: 76 (26 Aug 2024 14:00) (65 - 76)  BP: 120/77 (26 Aug 2024 14:00) (120/77 - 135/84)  BP(mean): --  RR: 18 (26 Aug 2024 14:00) (18 - 18)  SpO2: 97% (26 Aug 2024 05:00) (97% - 98%)    PHYSICAL EXAM:      Constitutional: A&Ox4  Respiratory: cta b/l  Cardiovascular: s1 s2 rrr  Gastrointestinal: soft nt  nd + bs no rebound or guarding  Genitourinary: no cva tenderness  Extremities: normal rom, no edema, calf tenderness  Neurological:no focal deficits  Skin: no rash                            12.7   5.54  )-----------( 414      ( 26 Aug 2024 05:34 )             35.0       08-26    138  |  100  |  6<L>  ----------------------------<  88  3.8   |  26  |  0.9    Ca    8.7      26 Aug 2024 05:34  Phos  4.2     08-26  Mg     2.0     08-26            Urinalysis Basic - ( 26 Aug 2024 05:34 )    Color: x / Appearance: x / SG: x / pH: x  Gluc: 88 mg/dL / Ketone: x  / Bili: x / Urobili: x   Blood: x / Protein: x / Nitrite: x   Leuk Esterase: x / RBC: x / WBC x   Sq Epi: x / Non Sq Epi: x / Bacteria: x        CULTURE RESULTS:                08-22-24 @ 00:06  Specimen Source: --  Method Type: --  Gram Stain - RRL: --  Gram Stain - Wound: --  Bacteria: Negative  Culture Results:   No growth at 72 Hours      Specimen Source:   Method Type:   Gram Stain:   Culture Results: Culture Results:   No growth at 72 Hours (08-22-24 @ 00:06)  Culture Results:   No growth at 72 Hours (08-22-24 @ 00:06)    Bacteria: Bacteria: Negative /HPF (08-22-24 @ 00:06)           HPI:     PAST MEDICAL & SURGICAL HISTORY:  Insomnia    Diverticulosis        Allergies    penicillin (Unknown)        MEDICATIONS  (STANDING):  chlorhexidine 4% Liquid 1 Application(s) Topical <User Schedule>  dextrose 5% + sodium chloride 0.9%. 1000 milliLiter(s) (125 mL/Hr) IV Continuous <Continuous>  enoxaparin Injectable 40 milliGRAM(s) SubCutaneous every 24 hours  ertapenem  IVPB 1000 milliGRAM(s) IV Intermittent every 24 hours  pantoprazole  Injectable 40 milliGRAM(s) IV Push every 24 hours    MEDICATIONS  (PRN):   acetaminophen     Tablet .. 650 milliGRAM(s) Oral every 6 hours PRN Temp greater or equal to 38C (100.4F), Mild Pain (1 - 3)  aluminum hydroxide/magnesium hydroxide/simethicone Suspension 30 milliLiter(s) Oral every 4 hours PRN Dyspepsia  melatonin 3 milliGRAM(s) Oral at bedtime PRN Insomnia  morphine  - Injectable 2 milliGRAM(s) IV Push every 3 hours PRN Severe Pain (7 - 10)  ondansetron Injectable 4 milliGRAM(s) IV Push every 8 hours PRN Nausea and/or Vomiting  zolpidem 5 milliGRAM(s) Oral at bedtime PRN Insomnia      General:	no fever, weight loss,  chills  Skin: no rash, ulcers  Ophthalmologic: no visual changes  ENMT:	no sore throat  Respiratory and Thorax: no cough, wheeze,  sob  Cardiovascular:	no chest pain, palpitations, dizziness  Gastrointestinal:	no nausea, vomiting, diarrhea, abd pain  Genitourinary:	no dysuria, hematuria  Musculoskeletal:	no joint pains  Neurological:	 no speech disturbance, focal weakness, numbness  Psychiatric:	no depression, anxiety, psychosis  Hematology/Lymphatics:	no anemia  Endocrine:	no polyuria, polydipsia        Vital Signs Last 24 Hrs  T(C): 36.8 (26 Aug 2024 14:00), Max: 37.1 (26 Aug 2024 05:00)  T(F): 98.3 (26 Aug 2024 14:00), Max: 98.7 (26 Aug 2024 05:00)  HR: 76 (26 Aug 2024 14:00) (65 - 76)  BP: 120/77 (26 Aug 2024 14:00) (120/77 - 135/84)  BP(mean): --  RR: 18 (26 Aug 2024 14:00) (18 - 18)  SpO2: 97% (26 Aug 2024 05:00) (97% - 98%)    PHYSICAL EXAM:      Constitutional: A&Ox4  Respiratory: cta b/l  Cardiovascular: s1 s2 rrr  Gastrointestinal: soft nt  nd + bs no rebound or guarding  Genitourinary: no cva tenderness  Extremities: normal rom, no edema, calf tenderness  Neurological:no focal deficits  Skin: no rash                            12.7   5.54  )-----------( 414      ( 26 Aug 2024 05:34 )             35.0       08-26    138  |  100  |  6<L>  ----------------------------<  88  3.8   |  26  |  0.9    Ca    8.7      26 Aug 2024 05:34  Phos  4.2     08-26  Mg     2.0     08-26            Urinalysis Basic - ( 26 Aug 2024 05:34 )    Color: x / Appearance: x / SG: x / pH: x  Gluc: 88 mg/dL / Ketone: x  / Bili: x / Urobili: x   Blood: x / Protein: x / Nitrite: x   Leuk Esterase: x / RBC: x / WBC x   Sq Epi: x / Non Sq Epi: x / Bacteria: x        CULTURE RESULTS:                08-22-24 @ 00:06  Specimen Source: --  Method Type: --  Gram Stain - RRL: --  Gram Stain - Wound: --  Bacteria: Negative  Culture Results:   No growth at 72 Hours      Specimen Source:   Method Type:   Gram Stain:   Culture Results: Culture Results:   No growth at 72 Hours (08-22-24 @ 00:06)  Culture Results:   No growth at 72 Hours (08-22-24 @ 00:06)    Bacteria: Bacteria: Negative /HPF (08-22-24 @ 00:06)

## 2024-08-27 LAB
CULTURE RESULTS: SIGNIFICANT CHANGE UP
CULTURE RESULTS: SIGNIFICANT CHANGE UP
SPECIMEN SOURCE: SIGNIFICANT CHANGE UP
SPECIMEN SOURCE: SIGNIFICANT CHANGE UP

## 2024-08-27 RX ADMIN — ERTAPENEM SODIUM 120 MILLIGRAM(S): 1 INJECTION, POWDER, LYOPHILIZED, FOR SOLUTION INTRAMUSCULAR; INTRAVENOUS at 21:27

## 2024-08-27 NOTE — PROGRESS NOTE ADULT - SUBJECTIVE AND OBJECTIVE BOX
Subjective: 52yo male             Vital Signs Last 24 Hrs  T(C): 36.6 (27 Aug 2024 04:48), Max: 36.8 (26 Aug 2024 14:00)  T(F): 97.9 (27 Aug 2024 04:48), Max: 98.3 (26 Aug 2024 14:00)  HR: 52 (27 Aug 2024 04:48) (52 - 76)  BP: 129/77 (27 Aug 2024 04:48) (120/77 - 129/77)  BP(mean): --  RR: 18 (27 Aug 2024 04:48) (18 - 18)  SpO2: 97% (27 Aug 2024 04:48) (97% - 98%)    Parameters below as of 27 Aug 2024 04:48  Patient On (Oxygen Delivery Method): room air        General Appearance: Appears well, NAD  Neck: Supple  Chest: Equal expansion bilaterally, equal breath sounds  CV: Pulse regular presently  Abdomen: Soft, NT/ND,+bs,  no rebound/gaurding  Extremities: Grossly symmetric, no calf tend b/l    Mondragon:  NGT:  SAUL:    I&O's Summary    I&O's Detail      MEDICATIONS  (STANDING):  chlorhexidine 4% Liquid 1 Application(s) Topical <User Schedule>  dextrose 5% + sodium chloride 0.9%. 1000 milliLiter(s) (125 mL/Hr) IV Continuous <Continuous>  enoxaparin Injectable 40 milliGRAM(s) SubCutaneous every 24 hours  ertapenem  IVPB 1000 milliGRAM(s) IV Intermittent every 24 hours  pantoprazole  Injectable 40 milliGRAM(s) IV Push every 24 hours    MEDICATIONS  (PRN):  acetaminophen     Tablet .. 650 milliGRAM(s) Oral every 6 hours PRN Temp greater or equal to 38C (100.4F), Mild Pain (1 - 3)  aluminum hydroxide/magnesium hydroxide/simethicone Suspension 30 milliLiter(s) Oral every 4 hours PRN Dyspepsia  melatonin 3 milliGRAM(s) Oral at bedtime PRN Insomnia  morphine  - Injectable 2 milliGRAM(s) IV Push every 3 hours PRN Severe Pain (7 - 10)  ondansetron Injectable 4 milliGRAM(s) IV Push every 8 hours PRN Nausea and/or Vomiting  zolpidem 5 milliGRAM(s) Oral at bedtime PRN Insomnia      LABS:                        12.7   5.54  )-----------( 414      ( 26 Aug 2024 05:34 )             35.0     08-26    138  |  100  |  6<L>  ----------------------------<  88  3.8   |  26  |  0.9    Ca    8.7      26 Aug 2024 05:34  Phos  4.2     08-26  Mg     2.0     08-26        Urinalysis Basic - ( 26 Aug 2024 05:34 )    Color: x / Appearance: x / SG: x / pH: x  Gluc: 88 mg/dL / Ketone: x  / Bili: x / Urobili: x   Blood: x / Protein: x / Nitrite: x   Leuk Esterase: x / RBC: x / WBC x   Sq Epi: x / Non Sq Epi: x / Bacteria: x        RADIOLOGY & ADDITIONAL STUDIES:

## 2024-08-28 LAB
ALBUMIN SERPL ELPH-MCNC: 3.8 G/DL — SIGNIFICANT CHANGE UP (ref 3.5–5.2)
ALP SERPL-CCNC: 59 U/L — SIGNIFICANT CHANGE UP (ref 30–115)
ALT FLD-CCNC: 14 U/L — SIGNIFICANT CHANGE UP (ref 0–41)
ANION GAP SERPL CALC-SCNC: 11 MMOL/L — SIGNIFICANT CHANGE UP (ref 7–14)
AST SERPL-CCNC: 16 U/L — SIGNIFICANT CHANGE UP (ref 0–41)
BASOPHILS # BLD AUTO: 0.03 K/UL — SIGNIFICANT CHANGE UP (ref 0–0.2)
BASOPHILS NFR BLD AUTO: 0.5 % — SIGNIFICANT CHANGE UP (ref 0–1)
BILIRUB SERPL-MCNC: 0.6 MG/DL — SIGNIFICANT CHANGE UP (ref 0.2–1.2)
BUN SERPL-MCNC: 7 MG/DL — LOW (ref 10–20)
CALCIUM SERPL-MCNC: 9.2 MG/DL — SIGNIFICANT CHANGE UP (ref 8.4–10.5)
CHLORIDE SERPL-SCNC: 99 MMOL/L — SIGNIFICANT CHANGE UP (ref 98–110)
CO2 SERPL-SCNC: 25 MMOL/L — SIGNIFICANT CHANGE UP (ref 17–32)
CREAT SERPL-MCNC: 0.9 MG/DL — SIGNIFICANT CHANGE UP (ref 0.7–1.5)
EGFR: 103 ML/MIN/1.73M2 — SIGNIFICANT CHANGE UP
EOSINOPHIL # BLD AUTO: 0.16 K/UL — SIGNIFICANT CHANGE UP (ref 0–0.7)
EOSINOPHIL NFR BLD AUTO: 2.8 % — SIGNIFICANT CHANGE UP (ref 0–8)
GLUCOSE SERPL-MCNC: 95 MG/DL — SIGNIFICANT CHANGE UP (ref 70–99)
HCT VFR BLD CALC: 36.9 % — LOW (ref 42–52)
HGB BLD-MCNC: 13.2 G/DL — LOW (ref 14–18)
IMM GRANULOCYTES NFR BLD AUTO: 0.4 % — HIGH (ref 0.1–0.3)
LYMPHOCYTES # BLD AUTO: 1.86 K/UL — SIGNIFICANT CHANGE UP (ref 1.2–3.4)
LYMPHOCYTES # BLD AUTO: 32.9 % — SIGNIFICANT CHANGE UP (ref 20.5–51.1)
MAGNESIUM SERPL-MCNC: 1.9 MG/DL — SIGNIFICANT CHANGE UP (ref 1.8–2.4)
MCHC RBC-ENTMCNC: 32.6 PG — HIGH (ref 27–31)
MCHC RBC-ENTMCNC: 35.8 G/DL — SIGNIFICANT CHANGE UP (ref 32–37)
MCV RBC AUTO: 91.1 FL — SIGNIFICANT CHANGE UP (ref 80–94)
MONOCYTES # BLD AUTO: 0.59 K/UL — SIGNIFICANT CHANGE UP (ref 0.1–0.6)
MONOCYTES NFR BLD AUTO: 10.4 % — HIGH (ref 1.7–9.3)
NEUTROPHILS # BLD AUTO: 3 K/UL — SIGNIFICANT CHANGE UP (ref 1.4–6.5)
NEUTROPHILS NFR BLD AUTO: 53 % — SIGNIFICANT CHANGE UP (ref 42.2–75.2)
NRBC # BLD: 0 /100 WBCS — SIGNIFICANT CHANGE UP (ref 0–0)
PHOSPHATE SERPL-MCNC: 4 MG/DL — SIGNIFICANT CHANGE UP (ref 2.1–4.9)
PLATELET # BLD AUTO: 394 K/UL — SIGNIFICANT CHANGE UP (ref 130–400)
PMV BLD: 8.2 FL — SIGNIFICANT CHANGE UP (ref 7.4–10.4)
POTASSIUM SERPL-MCNC: 4 MMOL/L — SIGNIFICANT CHANGE UP (ref 3.5–5)
POTASSIUM SERPL-SCNC: 4 MMOL/L — SIGNIFICANT CHANGE UP (ref 3.5–5)
PROT SERPL-MCNC: 6 G/DL — SIGNIFICANT CHANGE UP (ref 6–8)
RBC # BLD: 4.05 M/UL — LOW (ref 4.7–6.1)
RBC # FLD: 11.2 % — LOW (ref 11.5–14.5)
SODIUM SERPL-SCNC: 135 MMOL/L — SIGNIFICANT CHANGE UP (ref 135–146)
WBC # BLD: 5.66 K/UL — SIGNIFICANT CHANGE UP (ref 4.8–10.8)
WBC # FLD AUTO: 5.66 K/UL — SIGNIFICANT CHANGE UP (ref 4.8–10.8)

## 2024-08-28 PROCEDURE — 36556 INSERT NON-TUNNEL CV CATH: CPT

## 2024-08-28 PROCEDURE — 76937 US GUIDE VASCULAR ACCESS: CPT | Mod: 26,59

## 2024-08-28 RX ORDER — ERTAPENEM SODIUM 1 G/1
1 INJECTION, POWDER, LYOPHILIZED, FOR SOLUTION INTRAMUSCULAR; INTRAVENOUS
Qty: 15 | Refills: 0
Start: 2024-08-28 | End: 2024-09-11

## 2024-08-28 RX ADMIN — Medication 2 MILLIGRAM(S): at 00:38

## 2024-08-28 RX ADMIN — ZOLPIDEM TARTRATE 5 MILLIGRAM(S): 5 TABLET, FILM COATED ORAL at 01:22

## 2024-08-28 RX ADMIN — ERTAPENEM SODIUM 120 MILLIGRAM(S): 1 INJECTION, POWDER, LYOPHILIZED, FOR SOLUTION INTRAMUSCULAR; INTRAVENOUS at 20:20

## 2024-08-28 RX ADMIN — Medication 2 MILLIGRAM(S): at 01:38

## 2024-08-28 NOTE — PROGRESS NOTE ADULT - SUBJECTIVE AND OBJECTIVE BOX
INFECTIOUS DISEASE FOLLOW UP NOTE:    Interval History/ROS: Patient is a 53y old  Male who presents with a chief complaint of Abdominal pain (26 Aug 2024 21:23)    Overnight events: No overnight event. Improved abdominal pain. Tolerating diet.    REVIEW OF SYSTEMS:  CONSTITUTIONAL: No fever or chills  HEAD: No lesion on scalp  EYES: No visual disturbance  ENT: No sore throat  RESPIRATORY: No cough, no shortness of breath  CARDIOVASCULAR: No chest pain or palpitations  GASTROINTESTINAL: + improved abdominal pain  GENITOURINARY: No dysuria  NEUROLOGICAL: No headache/dizziness  MUSCULOSKELETAL: No joint pain, erythema, or swelling; no back pain  SKIN: No itching, rashes  All other ROS negative except noted above      Prior hospital charts reviewed [Yes]  Primary team notes reviewed [Yes]  Other consultant notes reviewed [Yes]    Allergies:  penicillin (Unknown)      ANTIMICROBIALS:   ertapenem  IVPB 1000 every 24 hours      OTHER MEDS: MEDICATIONS  (STANDING):  acetaminophen     Tablet .. 650 every 6 hours PRN  aluminum hydroxide/magnesium hydroxide/simethicone Suspension 30 every 4 hours PRN  enoxaparin Injectable 40 every 24 hours  melatonin 3 at bedtime PRN  morphine  - Injectable 2 every 3 hours PRN  ondansetron Injectable 4 every 8 hours PRN  pantoprazole  Injectable 40 every 24 hours  zolpidem 5 at bedtime PRN      Vital Signs Last 24 Hrs  T(F): 98.5 (08-28-24 @ 14:39), Max: 99.4 (08-21-24 @ 22:36)    Vital Signs Last 24 Hrs  HR: 72 (08-28-24 @ 14:39) (68 - 72)  BP: 148/82 (08-28-24 @ 14:39) (123/78 - 148/82)  RR: 18 (08-28-24 @ 14:39)  SpO2: --  Wt(kg): --    EXAM:  GENERAL: NAD, lying in bed  HEAD: No head lesions  EYES: Conjunctiva pink and cornea white  EAR, NOSE, MOUTH, THROAT: Normal external ears and nose, no discharges; moist mucous membranes  NECK: Supple, nontender to palpation; no JVD  RESPIRATORY: Clear to auscultation bilaterally  CARDIOVASCULAR: S1 S2  GASTROINTESTINAL: LLQ abdominal discomfort; normoactive bowel sounds  GENITOURINARY: No gore catheter, no CVA tenderness  EXTREMITIES: No clubbing, cyanosis, or petal edema  NERVOUS SYSTEM: Alert and oriented to person, time, place and situation, speech clear. No focal deficits   MUSCULOSKELETAL: No joint erythema, swelling or pain  SKIN: No rashes or lesions, no superficial thrombophlebitis  PSYCH: Normal affect    Labs:                        13.2   5.66  )-----------( 394      ( 28 Aug 2024 06:32 )             36.9     08-28    135  |  99  |  7<L>  ----------------------------<  95  4.0   |  25  |  0.9    Ca    9.2      28 Aug 2024 06:32  Phos  4.0     08-28  Mg     1.9     08-28    TPro  6.0  /  Alb  3.8  /  TBili  0.6  /  DBili  x   /  AST  16  /  ALT  14  /  AlkPhos  59  08-28      WBC Trend:  WBC Count: 5.66 (08-28-24 @ 06:32)  WBC Count: 5.54 (08-26-24 @ 05:34)  WBC Count: 5.49 (08-25-24 @ 12:10)  WBC Count: 7.00 (08-24-24 @ 06:22)      Creatine Trend:  Creatinine: 0.9 (08-28)  Creatinine: 0.9 (08-26)  Creatinine: 0.9 (08-25)  Creatinine: 0.8 (08-24)      Liver Biochemical Testing Trend:  Alanine Aminotransferase (ALT/SGPT): 14 (08-28)  Alanine Aminotransferase (ALT/SGPT): 15 (08-24)  Alanine Aminotransferase (ALT/SGPT): 23 (08-21)  Alanine Aminotransferase (ALT/SGPT): 33 (07-05)  Alanine Aminotransferase (ALT/SGPT): 24 (02-16)  Aspartate Aminotransferase (AST/SGOT): 16 (08-28-24 @ 06:32)  Aspartate Aminotransferase (AST/SGOT): 15 (08-24-24 @ 06:22)  Aspartate Aminotransferase (AST/SGOT): 26 (08-21-24 @ 23:36)  Aspartate Aminotransferase (AST/SGOT): 28 (07-05-24 @ 12:39)  Aspartate Aminotransferase (AST/SGOT): 20 (02-16-24 @ 10:00)  Bilirubin Total: 0.6 (08-28)  Bilirubin Total: 0.7 (08-24)  Bilirubin Total: 0.9 (08-21)  Bilirubin Total: 0.5 (07-05)  Bilirubin Direct: 0.2 (02-16)      Trend LDH      Urinalysis Basic - ( 28 Aug 2024 06:32 )    Color: x / Appearance: x / SG: x / pH: x  Gluc: 95 mg/dL / Ketone: x  / Bili: x / Urobili: x   Blood: x / Protein: x / Nitrite: x   Leuk Esterase: x / RBC: x / WBC x   Sq Epi: x / Non Sq Epi: x / Bacteria: x        MICROBIOLOGY:        Urinalysis with Rflx Culture (collected 22 Aug 2024 00:06)    Culture - Blood (collected 22 Aug 2024 00:06)  Source: .Blood Blood-Peripheral  Final Report:    No growth at 5 days    Culture - Blood (collected 22 Aug 2024 00:06)  Source: .Blood Blood-Peripheral  Final Report:    No growth at 5 days    Urinalysis with Rflx Culture (collected 05 Jul 2024 13:48)    Culture - Urine (collected 10 Feb 2024 16:30)  Source: Clean Catch Clean Catch (Midstream)  Final Report:    No growth        RADIOLOGY:  imaging below personally reviewed    < from: CT Abdomen and Pelvis w/ Oral Cont and w/ IV Cont (08.26.24 @ 13:55) >    IMPRESSION:  1. Since August 22, 2024, slightly increased moderate mural thickening   involving a short segment of sigmoid colon, with increased   peridiverticular inflammatory changes and new or increased foci of free   air within the left lower quadrant measuring up to 3.3 cm (previously   subcentimeter); no focal drainable fluid collection.  2. Substantially decreased free intraperitoneal air within the upper   abdomen.  3. Remainder of findings are overall unchanged on this short-term   follow-up exam.        < end of copied text >

## 2024-08-28 NOTE — PROGRESS NOTE ADULT - ASSESSMENT
52yo male w/ pmhx of diverticulitis, surgical hx of b/l inguinal hernia repair 30 years ago presenting w/ LLQ abdominal pain.    ID is following for perforated diverticulitis  Afebrile, WBC 18.46 > 5.54  On Room air  Multiple episodes of diverticulitis in this past 6 months  BCx pending    CT A/P 8/22  Perforated sigmoid diverticulitis.  Pneumoperitoneum is present. Multiple distal locules of free air noted;   for example, along the liver, left upper quadrant, and left psoas muscle.    CT A/P 8/26  1. Since August 22, 2024, slightly increased moderate mural thickening   involving a short segment of sigmoid colon, with increased   peridiverticular inflammatory changes and new or increased foci of free   air within the left lower quadrant measuring up to 3.3 cm (previously   subcentimeter); no focal drainable fluid collection.  2. Substantially decreased free intraperitoneal air within the upper   abdomen.    Antibiotics:  Cipro 8/22 - 8/23  Flagyl 8/22 - 8/23  Ertapenem 8/23 ->      IMPRESSION:  Perforated diverticulitis  Pneumoperitoneum  Drug allergy    RECOMMENDATIONS:  - Continue IV ertapenem 1g q24hrs  - On discharge, plan to send home with IV ertapenem x 21 days via midline (until 9/12, longer course due to increased inflammatory changes)  - Prior stopping abx, repeat CT A/P w contrast  - Surgery following  - Offloading and frequent position changes, aspiration precaution  - Trend WBC, fever curve, transaminases, creatinine daily    - Weekly CBC, CMP  - ID follow-up with Dr. Rickie Watson for Telehealth. We will call the patient between 10:30-1:30      [address]        [phone]       Fax [phone]      Beverly Bray D.O.  Attending Physician  Division of Infectious Diseases  [facility]  Please contact me via [employer] Teams

## 2024-08-28 NOTE — PROCEDURE NOTE - NSPOSTCAREGUIDE_GEN_A_CORE
Verbal/written post procedure instructions were given to patient/caregiver/Instructed patient/caregiver to follow-up with primary care physician/Instructed patient/caregiver regarding signs and symptoms of infection/Keep the cast/splint/dressing clean and dry/Care for catheter as per unit/ICU protocols
Yes

## 2024-08-28 NOTE — PROGRESS NOTE ADULT - SUBJECTIVE AND OBJECTIVE BOX
Patient seen and examined at bedside, resting comfortably.  Pain improved.  +BM reported overnight.  No N/V.      Vital Signs Last 24 Hrs  T(C): 36.4 (28 Aug 2024 04:54), Max: 37 (27 Aug 2024 21:30)  T(F): 97.6 (28 Aug 2024 04:54), Max: 98.6 (27 Aug 2024 21:30)  HR: 68 (28 Aug 2024 04:54) (68 - 72)  BP: 123/78 (28 Aug 2024 04:54) (123/78 - 146/82)  RR: 16 (28 Aug 2024 04:54) (16 - 18)      General Appearance: NAD  Chest: CTA B/L  CV: S1 S2  Abdomen: Soft, NT/ND,+bs,  no rebound/guarding  Extremities: No edema BLE  CNS: A/O x 3    MEDICATIONS  (STANDING):  chlorhexidine 4% Liquid 1 Application(s) Topical <User Schedule>  dextrose 5% + sodium chloride 0.9%. 1000 milliLiter(s) (125 mL/Hr) IV Continuous <Continuous>  enoxaparin Injectable 40 milliGRAM(s) SubCutaneous every 24 hours  ertapenem  IVPB 1000 milliGRAM(s) IV Intermittent every 24 hours  pantoprazole  Injectable 40 milliGRAM(s) IV Push every 24 hours    MEDICATIONS  (PRN):  acetaminophen     Tablet .. 650 milliGRAM(s) Oral every 6 hours PRN Temp greater or equal to 38C (100.4F), Mild Pain (1 - 3)  aluminum hydroxide/magnesium hydroxide/simethicone Suspension 30 milliLiter(s) Oral every 4 hours PRN Dyspepsia  melatonin 3 milliGRAM(s) Oral at bedtime PRN Insomnia  morphine  - Injectable 2 milliGRAM(s) IV Push every 3 hours PRN Severe Pain (7 - 10)  ondansetron Injectable 4 milliGRAM(s) IV Push every 8 hours PRN Nausea and/or Vomiting  zolpidem 5 milliGRAM(s) Oral at bedtime PRN Insomnia      LABS:                        13.2   5.66  )-----------( 394      ( 28 Aug 2024 06:32 )             36.9     08-28    135  |  99  |  7<L>  ----------------------------<  95  4.0   |  25  |  0.9    Ca    9.2      28 Aug 2024 06:32  Phos  4.0     08-28  Mg     1.9     08-28    TPro  6.0  /  Alb  3.8  /  TBili  0.6  /  DBili  x   /  AST  16  /  ALT  14  /  AlkPhos  59  08-28      Urinalysis Basic - ( 28 Aug 2024 06:32 )    Color: x / Appearance: x / SG: x / pH: x  Gluc: 95 mg/dL / Ketone: x  / Bili: x / Urobili: x   Blood: x / Protein: x / Nitrite: x   Leuk Esterase: x / RBC: x / WBC x   Sq Epi: x / Non Sq Epi: x / Bacteria: x        RADIOLOGY & ADDITIONAL STUDIES: Reviewed

## 2024-08-28 NOTE — PROGRESS NOTE ADULT - ASSESSMENT
Patient is a 52yo male w/ pmhx of diverticulitis, surgical hx of b/l inguinal hernia repair 30 years ago presenting w/ LLQ abdominal pain.  Reports this is the 4th episode of diverticulitis with in the year      #Sepsis 2/2 Perforated Sigmoid Diverticulitis   - cont clears  - CT A/P w PO & IV contrast 8/26: overall improved  - Per ID: ertapenem x 14 days (started on 8/22), will discuss about prolonged course  - Pain control prn   - blood cultures: neg   -lovenox for dvt prophylaxis    #Insomnia  - c/w home Lunesta prn for insomnia - zolpidem inpatient   Patient is a 52yo male w/ pmhx of diverticulitis, surgical hx of b/l inguinal hernia repair 30 years ago presenting w/ LLQ abdominal pain.  Reports this is the 4th episode of diverticulitis with in the year      #Sepsis 2/2 Perforated Sigmoid Diverticulitis   - diet advanced, soft and bite sized, low residue  - CT A/P w PO & IV contrast 8/26: overall improved  - Per ID: ertapenem x 14 days (started on 8/22), will discuss about prolonged course.  May need PICC  - Pain control prn   - blood cultures: neg     #Insomnia  - c/w home Lunesta prn for insomnia - zolpidem inpatient

## 2024-08-28 NOTE — PROCEDURE NOTE - PROCEDURE DATE TIME, MLM
----- Message from Lien Thornton sent at 12/28/2018  1:33 PM CST -----  Type: Needs Medical Advice    Who Called:  Patient  Best Call Back Number: [phone]  Additional Information: Needs to speak with nurse concerning scheduling colonoscopy/has question/please call patient back to advise.   28-Aug-2024 16:29

## 2024-08-29 ENCOUNTER — TRANSCRIPTION ENCOUNTER (OUTPATIENT)
Age: 54
End: 2024-08-29

## 2024-08-29 VITALS
RESPIRATION RATE: 16 BRPM | OXYGEN SATURATION: 97 % | TEMPERATURE: 97 F | DIASTOLIC BLOOD PRESSURE: 49 MMHG | SYSTOLIC BLOOD PRESSURE: 107 MMHG | HEART RATE: 69 BPM

## 2024-08-29 LAB
ALBUMIN SERPL ELPH-MCNC: 4 G/DL — SIGNIFICANT CHANGE UP (ref 3.5–5.2)
ALP SERPL-CCNC: 62 U/L — SIGNIFICANT CHANGE UP (ref 30–115)
ALT FLD-CCNC: 18 U/L — SIGNIFICANT CHANGE UP (ref 0–41)
ANION GAP SERPL CALC-SCNC: 11 MMOL/L — SIGNIFICANT CHANGE UP (ref 7–14)
AST SERPL-CCNC: 18 U/L — SIGNIFICANT CHANGE UP (ref 0–41)
BILIRUB SERPL-MCNC: 0.6 MG/DL — SIGNIFICANT CHANGE UP (ref 0.2–1.2)
BUN SERPL-MCNC: 8 MG/DL — LOW (ref 10–20)
CALCIUM SERPL-MCNC: 9.7 MG/DL — SIGNIFICANT CHANGE UP (ref 8.4–10.5)
CHLORIDE SERPL-SCNC: 98 MMOL/L — SIGNIFICANT CHANGE UP (ref 98–110)
CO2 SERPL-SCNC: 26 MMOL/L — SIGNIFICANT CHANGE UP (ref 17–32)
CREAT SERPL-MCNC: 1.1 MG/DL — SIGNIFICANT CHANGE UP (ref 0.7–1.5)
EGFR: 81 ML/MIN/1.73M2 — SIGNIFICANT CHANGE UP
GLUCOSE SERPL-MCNC: 93 MG/DL — SIGNIFICANT CHANGE UP (ref 70–99)
HCT VFR BLD CALC: 37.5 % — LOW (ref 42–52)
HGB BLD-MCNC: 13.5 G/DL — LOW (ref 14–18)
MAGNESIUM SERPL-MCNC: 1.9 MG/DL — SIGNIFICANT CHANGE UP (ref 1.8–2.4)
MCHC RBC-ENTMCNC: 33 PG — HIGH (ref 27–31)
MCHC RBC-ENTMCNC: 36 G/DL — SIGNIFICANT CHANGE UP (ref 32–37)
MCV RBC AUTO: 91.7 FL — SIGNIFICANT CHANGE UP (ref 80–94)
NRBC # BLD: 0 /100 WBCS — SIGNIFICANT CHANGE UP (ref 0–0)
PHOSPHATE SERPL-MCNC: 3.9 MG/DL — SIGNIFICANT CHANGE UP (ref 2.1–4.9)
PLATELET # BLD AUTO: 387 K/UL — SIGNIFICANT CHANGE UP (ref 130–400)
PMV BLD: 8.1 FL — SIGNIFICANT CHANGE UP (ref 7.4–10.4)
POTASSIUM SERPL-MCNC: 4.5 MMOL/L — SIGNIFICANT CHANGE UP (ref 3.5–5)
POTASSIUM SERPL-SCNC: 4.5 MMOL/L — SIGNIFICANT CHANGE UP (ref 3.5–5)
PROT SERPL-MCNC: 6 G/DL — SIGNIFICANT CHANGE UP (ref 6–8)
RBC # BLD: 4.09 M/UL — LOW (ref 4.7–6.1)
RBC # FLD: 11.3 % — LOW (ref 11.5–14.5)
SODIUM SERPL-SCNC: 135 MMOL/L — SIGNIFICANT CHANGE UP (ref 135–146)
WBC # BLD: 6.09 K/UL — SIGNIFICANT CHANGE UP (ref 4.8–10.8)
WBC # FLD AUTO: 6.09 K/UL — SIGNIFICANT CHANGE UP (ref 4.8–10.8)

## 2024-08-29 RX ORDER — ERTAPENEM SODIUM 1 G/1
1 INJECTION, POWDER, LYOPHILIZED, FOR SOLUTION INTRAMUSCULAR; INTRAVENOUS
Qty: 0 | Refills: 0 | DISCHARGE
Start: 2024-08-29

## 2024-08-29 RX ADMIN — CHLORHEXIDINE GLUCONATE 1 APPLICATION(S): 40 SOLUTION TOPICAL at 05:37

## 2024-08-29 RX ADMIN — Medication 2 MILLIGRAM(S): at 00:38

## 2024-08-29 RX ADMIN — Medication 40 MILLIGRAM(S): at 05:26

## 2024-08-29 NOTE — DISCHARGE NOTE PROVIDER - NSDCCPCAREPLAN_GEN_ALL_CORE_FT
PRINCIPAL DISCHARGE DIAGNOSIS  Diagnosis: Perforated sigmoid colon  Assessment and Plan of Treatment: You were treated conservatievly with IV ABX and slow advancement in diet with improvement.  You received a midline and will get IV ABX until 9/12 once per day.  You will have telehealth with Dr. Gallegos weekly while on ABX.  Follow up with Dr. Bynum in his office in 2 weeks, you will need a repeat CT scan.  If you develop severe pain, N/V, or fever, please return to ED for evaluation.      SECONDARY DISCHARGE DIAGNOSES  Diagnosis: Pneumoperitoneum  Assessment and Plan of Treatment: Resolved    Diagnosis: Sepsis  Assessment and Plan of Treatment: Resolved

## 2024-08-29 NOTE — DISCHARGE NOTE PROVIDER - PROVIDER TOKENS
PROVIDER:[TOKEN:[[id number]]],PROVIDER:[TOKEN:[[id number]]],PROVIDER:[TOKEN:[[id number]]]

## 2024-08-29 NOTE — DISCHARGE NOTE NURSING/CASE MANAGEMENT/SOCIAL WORK - PATIENT PORTAL LINK FT
You can access the FollowMyHealth Patient Portal offered by [facility] by registering at the following website: [email or web address]. By joining [employer]’s FollowMyHealth portal, you will also be able to view your health information using other applications (apps) compatible with our system.

## 2024-08-29 NOTE — PROGRESS NOTE ADULT - PROVIDER SPECIALTY LIST ADULT
Surgery
Infectious Disease
Infectious Disease
Surgery
Surgery
Infectious Disease
Surgery
Surgery

## 2024-08-29 NOTE — DISCHARGE NOTE PROVIDER - HOSPITAL COURSE
Patient is a 52yo male w/ pmhx of diverticulitis, surgical hx of b/l inguinal hernia repair 30 years ago presenting w/ LLQ abdominal pain.  Reports this is the 4th episode of diverticulitis with in the year      #Sepsis 2/2 Perforated Sigmoid Diverticulitis   - diet advanced, soft and bite sized, low residue  - CT A/P w PO & IV contrast 8/26: overall improved  - Per ID: ertapenem x 21 days, course completed 9/12  - outpatient FU with Dr. Bynum in 2 weeks for repeat CT scan  - outpatient FU with Dr. Gallegos via telehealth while on ABX, weekly labs per ID  - blood cultures: neg     #Insomnia  - c/w home Lunesta prn for insomnia

## 2024-08-29 NOTE — PROGRESS NOTE ADULT - ASSESSMENT
ASSESSMENT AND PLAN:   52yo male w pmhx of diverticulitis presenting with LLQ abd pain.     # perforated sigmoid diverticulitis   - diet advanced, tolerating soft and bite sized (low fiber)   - last received morphine 0:38 8/29, pain controlled PRN   - ertapenem x 14 days (started 8/22), discussed with ID   - PICC inserted 8/28    # insomnia  - zolpidem PRN    54yo male w pmhx of diverticulitis presenting with LLQ abd pain.     # perforated sigmoid diverticulitis   - diet advanced, tolerating soft and bite sized (low fiber)   - pain controlled PRN   - iV ertapenem x 21 days via midline (until 9/12discussed with ID   - midline in place     # insomnia  - zolpidem PRN     poss d/c today after ss/ cm setup home care with abx

## 2024-08-29 NOTE — DISCHARGE NOTE PROVIDER - CARE PROVIDER_API CALL
Nandini Bynum  Surgery  [address], [address]  [place] [address]  Phone: [phone]  Fax: [phone]  Follow Up Time:     MARILYNN TORIBIO  [address] [place] [address]  Phone: [phone]  Fax: [phone]  Follow Up Time:     Rickie Gallegos  Infectious Disease  [address] [place] [address]  Phone: [phone]  Fax: [phone]  Follow Up Time:

## 2024-08-29 NOTE — DISCHARGE NOTE PROVIDER - CARE PROVIDERS DIRECT ADDRESSES
,DirectAddress_Unknown,[email or web address]

## 2024-08-29 NOTE — DISCHARGE NOTE PROVIDER - NSDCFUSCHEDAPPT_GEN_ALL_CORE_FT
Ernesto Muñoz  [facility] Physician [facility]  CARDIOLOGY [address]  Scheduled Appointment: 11/21/2024

## 2024-08-29 NOTE — PROGRESS NOTE ADULT - SUBJECTIVE AND OBJECTIVE BOX
HPI: Patient is a 53y old M, PMHx of diverticulitis, chief complaint of abdominal pain, admitted for perforated sigmoid diverticulitis.     Diet was advanced to soft and bite sized (low fiber) 8/27 patient is tolerating it well. Patient reports mild pain with palpation of LLQ and that his abd feels softer and less distended than yesterday. He is having BMs and urinating, no n/v.   PICC was inserted yesterday 8/28     PAST MEDICAL & SURGICAL HISTORY:  Insomnia      MEDICATIONS  (STANDING):  chlorhexidine 4% Liquid 1 Application(s) Topical <User Schedule>  dextrose 5% + sodium chloride 0.9%. 1000 milliLiter(s) (125 mL/Hr) IV Continuous <Continuous>  enoxaparin Injectable 40 milliGRAM(s) SubCutaneous every 24 hours  ertapenem  IVPB 1000 milliGRAM(s) IV Intermittent every 24 hours  pantoprazole  Injectable 40 milliGRAM(s) IV Push every 24 hours    MEDICATIONS  (PRN):  acetaminophen     Tablet .. 650 milliGRAM(s) Oral every 6 hours PRN Temp greater or equal to 38C (100.4F), Mild Pain (1 - 3)  aluminum hydroxide/magnesium hydroxide/simethicone Suspension 30 milliLiter(s) Oral every 4 hours PRN Dyspepsia  melatonin 3 milliGRAM(s) Oral at bedtime PRN Insomnia  morphine  - Injectable 2 milliGRAM(s) IV Push every 3 hours PRN Severe Pain (7 - 10)  ondansetron Injectable 4 milliGRAM(s) IV Push every 8 hours PRN Nausea and/or Vomiting  zolpidem 5 milliGRAM(s) Oral at bedtime PRN Insomnia    General: no fever or chills  Respiratory and Thorax: no cough, wheeze,  sob  Cardiovascular:	no chest pain, palpitations, dizziness  Gastrointestinal:	no nausea, vomiting, diarrhea, + mild abd pain LLQ  Genitourinary:bno dysuria, hematuria    Vital Signs Last 24 Hrs  T(F): 97.3 (29 Aug 2024 05:12), Max: 98.5 (28 Aug 2024 14:39)  HR: 69 (29 Aug 2024 05:12) (69 - 80)  BP: 107/49 (29 Aug 2024 05:12) (107/49 - 148/82)  *low, pt states it was taken while he was asleep   RR: 16 (29 Aug 2024 05:12) (16 - 18)  SpO2: 97% (29 Aug 2024 05:12) (97% - 97%)    PHYSICAL EXAM:  Constitutional: A&Ox4  Respiratory: cta b/l  Cardiovascular: s1 s2 rrr  Gastrointestinal: no rebound or guarding, LLQ mild pain to palpation   Extremities: normal rom, no edema, calf tenderness      LABS:                       13.5   6.09  )-----------( 387      ( 29 Aug 2024 07:03 )             37.5       08-29    135  |  98  |  8<L>  ----------------------------<  93  4.5   |  26  |  1.1    Ca    9.7      29 Aug 2024 07:03  Phos  3.9     08-29  Mg     1.9     08-29    TPro  6.0  /  Alb  4.0  /  TBili  0.6  /  DBili  x   /  AST  18  /  ALT  18  /  AlkPhos  62  08-29          Urinalysis Basic - ( 29 Aug 2024 07:03 )    Color: x / Appearance: x / SG: x / pH: x  Gluc: 93 mg/dL / Ketone: x  / Bili: x / Urobili: x   Blood: x / Protein: x / Nitrite: x   Leuk Esterase: x / RBC: x / WBC x   Sq Epi: x / Non Sq Epi: x / Bacteria: x    RADIOLOGY:  none new      HPI: Patient is a 53y old M, PMHx of diverticulitis, chief complaint of abdominal pain, admitted for perforated sigmoid diverticulitis.     Diet was advanced to soft and bite sized (low fiber) 8/27 patient is tolerating it well. Patient reports mild pain with palpation of LLQ and that his abd feels softer and less distended than yesterday. He is having BMs and urinating, no n/v.     PAST MEDICAL & SURGICAL HISTORY:  diverticulitis   Insomnia    MEDICATIONS  (STANDING):  chlorhexidine 4% Liquid 1 Application(s) Topical <User Schedule>  dextrose 5% + sodium chloride 0.9%. 1000 milliLiter(s) (125 mL/Hr) IV Continuous <Continuous>  enoxaparin Injectable 40 milliGRAM(s) SubCutaneous every 24 hours  ertapenem  IVPB 1000 milliGRAM(s) IV Intermittent every 24 hours  pantoprazole  Injectable 40 milliGRAM(s) IV Push every 24 hours    MEDICATIONS  (PRN):  acetaminophen     Tablet .. 650 milliGRAM(s) Oral every 6 hours PRN Temp greater or equal to 38C (100.4F), Mild Pain (1 - 3)  aluminum hydroxide/magnesium hydroxide/simethicone Suspension 30 milliLiter(s) Oral every 4 hours PRN Dyspepsia  melatonin 3 milliGRAM(s) Oral at bedtime PRN Insomnia  morphine  - Injectable 2 milliGRAM(s) IV Push every 3 hours PRN Severe Pain (7 - 10)  ondansetron Injectable 4 milliGRAM(s) IV Push every 8 hours PRN Nausea and/or Vomiting  zolpidem 5 milliGRAM(s) Oral at bedtime PRN Insomnia    ROS:   General: no fever or chills  Respiratory and Thorax: no cough, wheeze, sob  Cardiovascular: no chest pain, palpitations, dizziness  Gastrointestinal:	no nausea, vomiting, diarrhea, + mild abd pain LLQ  Genitourinary: no dysuria, hematuria    Vital Signs Last 24 Hrs  T(F): 97.3 (29 Aug 2024 05:12), Max: 98.5 (28 Aug 2024 14:39)  HR: 69 (29 Aug 2024 05:12) (69 - 80)  BP: 107/49 (29 Aug 2024 05:12) (107/49 - 148/82)  *low, pt states it was taken while he was asleep   RR: 16 (29 Aug 2024 05:12) (16 - 18)  SpO2: 97% (29 Aug 2024 05:12) (97% - 97%)    PHYSICAL EXAM:  Constitutional: A&Ox4  Respiratory: cta b/l  Cardiovascular: s1 s2 rrr  Gastrointestinal: no rebound or guarding, LLQ mild pain to palpation   Extremities: normal rom, no edema, calf tenderness      LABS:                       13.5   6.09  )-----------( 387      ( 29 Aug 2024 07:03 )             37.5       08-29    135  |  98  |  8<L>  ----------------------------<  93  4.5   |  26  |  1.1    Ca    9.7      29 Aug 2024 07:03  Phos  3.9     08-29  Mg     1.9     08-29    TPro  6.0  /  Alb  4.0  /  TBili  0.6  /  DBili  x   /  AST  18  /  ALT  18  /  AlkPhos  62  08-29          Urinalysis Basic - ( 29 Aug 2024 07:03 )    Color: x / Appearance: x / SG: x / pH: x  Gluc: 93 mg/dL / Ketone: x  / Bili: x / Urobili: x   Blood: x / Protein: x / Nitrite: x   Leuk Esterase: x / RBC: x / WBC x   Sq Epi: x / Non Sq Epi: x / Bacteria: x    RADIOLOGY:  none new      53y old M, PMHx of diverticulitis, chief complaint of abdominal pain, admitted for perforated sigmoid diverticulitis.     patient is tolerating it well. Patient reports mild pain with palpation of LLQ and that his abd feels softer and less distended than yesterday.   He is having BMs and urinating, no n/v./ cp / sob / fever  diet enoc last night     PAST MEDICAL & SURGICAL HISTORY:  diverticulitis   Insomnia    MEDICATIONS  (STANDING):  chlorhexidine 4% Liquid 1 Application(s) Topical <User Schedule>  dextrose 5% + sodium chloride 0.9%. 1000 milliLiter(s) (125 mL/Hr) IV Continuous <Continuous>  enoxaparin Injectable 40 milliGRAM(s) SubCutaneous every 24 hours  ertapenem  IVPB 1000 milliGRAM(s) IV Intermittent every 24 hours  pantoprazole  Injectable 40 milliGRAM(s) IV Push every 24 hours    MEDICATIONS  (PRN):  acetaminophen     Tablet .. 650 milliGRAM(s) Oral every 6 hours PRN Temp greater or equal to 38C (100.4F), Mild Pain (1 - 3)  aluminum hydroxide/magnesium hydroxide/simethicone Suspension 30 milliLiter(s) Oral every 4 hours PRN Dyspepsia  melatonin 3 milliGRAM(s) Oral at bedtime PRN Insomnia  morphine  - Injectable 2 milliGRAM(s) IV Push every 3 hours PRN Severe Pain (7 - 10)  ondansetron Injectable 4 milliGRAM(s) IV Push every 8 hours PRN Nausea and/or Vomiting  zolpidem 5 milliGRAM(s) Oral at bedtime PRN Insomnia      Vital Signs Last 24 Hrs  T(F): 97.3 (29 Aug 2024 05:12), Max: 98.5 (28 Aug 2024 14:39)  HR: 69 (29 Aug 2024 05:12) (69 - 80)  BP: 107/49 (29 Aug 2024 05:12) (107/49 - 148/82)   RR: 16 (29 Aug 2024 05:12) (16 - 18)  SpO2: 97% (29 Aug 2024 05:12) (97% - 97%)    PHYSICAL EXAM:  Constitutional: A&Ox4  Respiratory: cta b/l  Cardiovascular: s1 s2 rrr  Gastrointestinal:  bs + soft no rebound or guarding, LLQ mild pain to palpation no rt   Extremities: normal rom, no edema, calf tenderness      LABS:                       13.5   6.09  )-----------( 387      ( 29 Aug 2024 07:03 )             37.5       08-29    135  |  98  |  8<L>  ----------------------------<  93  4.5   |  26  |  1.1    Ca    9.7      29 Aug 2024 07:03  Phos  3.9     08-29  Mg     1.9     08-29    TPro  6.0  /  Alb  4.0  /  TBili  0.6  /  DBili  x   /  AST  18  /  ALT  18  /  AlkPhos  62  08-29          Urinalysis Basic - ( 29 Aug 2024 07:03 )    Color: x / Appearance: x / SG: x / pH: x  Gluc: 93 mg/dL / Ketone: x  / Bili: x / Urobili: x   Blood: x / Protein: x / Nitrite: x   Leuk Esterase: x / RBC: x / WBC x   Sq Epi: x / Non Sq Epi: x / Bacteria: x    RADIOLOGY:  none new

## 2024-08-29 NOTE — DISCHARGE NOTE PROVIDER - DISCHARGE DIET
Regular Diet - No restrictions/Low Fiber Diet Low Sodium Diet/Soft and Bite-Sized Diet/Low Fiber Diet

## 2024-08-29 NOTE — DISCHARGE NOTE PROVIDER - NSDCMRMEDTOKEN_GEN_ALL_CORE_FT
ertapenem 1 g injection: 1 gram(s) injectable once a day Last day 9/12  eszopiclone 3 mg oral tablet: 1 tab(s) orally once a day (at bedtime) as needed for  insomnia

## 2024-08-29 NOTE — PROGRESS NOTE ADULT - ATTENDING COMMENTS
above noted discussed case with surgical resident and patient abdomen soft no distension ct scan noted will discuss case with ID
above noted discussed case with surgical resident and patient for ct scan
above noted discussed case with surgical resident and patient, abdomen soft no distension tender LLQ on IV AB
above noted discussed case with surgical resident and patient abdomen soft no distension/tenderness
above noted discussed case with surgical resident and patient abdomen soft no distension/tenderness

## 2024-09-05 DIAGNOSIS — K65.9 PERITONITIS, UNSPECIFIED: ICD-10-CM

## 2024-09-05 DIAGNOSIS — Z88.0 ALLERGY STATUS TO PENICILLIN: ICD-10-CM

## 2024-09-05 DIAGNOSIS — G47.00 INSOMNIA, UNSPECIFIED: ICD-10-CM

## 2024-09-05 DIAGNOSIS — K66.8 OTHER SPECIFIED DISORDERS OF PERITONEUM: ICD-10-CM

## 2024-09-05 DIAGNOSIS — A41.9 SEPSIS, UNSPECIFIED ORGANISM: ICD-10-CM

## 2024-09-05 DIAGNOSIS — K57.20 DIVERTICULITIS OF LARGE INTESTINE WITH PERFORATION AND ABSCESS WITHOUT BLEEDING: ICD-10-CM

## 2024-09-24 ENCOUNTER — NON-APPOINTMENT (OUTPATIENT)
Age: 54
End: 2024-09-24

## 2024-09-25 ENCOUNTER — APPOINTMENT (OUTPATIENT)
Dept: UROLOGY | Facility: HOSPITAL | Age: 54
End: 2024-09-25

## 2024-09-28 ENCOUNTER — INPATIENT (INPATIENT)
Facility: HOSPITAL | Age: 54
LOS: 0 days | Discharge: HOME HEALTH | DRG: 683 | End: 2024-09-29
Attending: STUDENT IN AN ORGANIZED HEALTH CARE EDUCATION/TRAINING PROGRAM | Admitting: INTERNAL MEDICINE
Payer: COMMERCIAL

## 2024-09-28 VITALS
TEMPERATURE: 98 F | RESPIRATION RATE: 18 BRPM | HEART RATE: 124 BPM | HEIGHT: 70 IN | WEIGHT: 184.97 LBS | DIASTOLIC BLOOD PRESSURE: 92 MMHG | OXYGEN SATURATION: 97 % | SYSTOLIC BLOOD PRESSURE: 164 MMHG

## 2024-09-28 DIAGNOSIS — N17.9 ACUTE KIDNEY FAILURE, UNSPECIFIED: ICD-10-CM

## 2024-09-28 LAB
ALBUMIN SERPL ELPH-MCNC: 3.6 G/DL — SIGNIFICANT CHANGE UP (ref 3.5–5.2)
ALP SERPL-CCNC: 73 U/L — SIGNIFICANT CHANGE UP (ref 30–115)
ALT FLD-CCNC: 55 U/L — HIGH (ref 0–41)
ANION GAP SERPL CALC-SCNC: 11 MMOL/L — SIGNIFICANT CHANGE UP (ref 7–14)
APPEARANCE UR: CLEAR — SIGNIFICANT CHANGE UP
AST SERPL-CCNC: 38 U/L — SIGNIFICANT CHANGE UP (ref 0–41)
BACTERIA # UR AUTO: NEGATIVE /HPF — SIGNIFICANT CHANGE UP
BASOPHILS # BLD AUTO: 0.01 K/UL — SIGNIFICANT CHANGE UP (ref 0–0.2)
BASOPHILS NFR BLD AUTO: 0.1 % — SIGNIFICANT CHANGE UP (ref 0–1)
BILIRUB SERPL-MCNC: 0.7 MG/DL — SIGNIFICANT CHANGE UP (ref 0.2–1.2)
BILIRUB UR-MCNC: NEGATIVE — SIGNIFICANT CHANGE UP
BUN SERPL-MCNC: 10 MG/DL — SIGNIFICANT CHANGE UP (ref 10–20)
CALCIUM SERPL-MCNC: 9.2 MG/DL — SIGNIFICANT CHANGE UP (ref 8.4–10.5)
CHLORIDE SERPL-SCNC: 89 MMOL/L — LOW (ref 98–110)
CO2 SERPL-SCNC: 24 MMOL/L — SIGNIFICANT CHANGE UP (ref 17–32)
COLOR SPEC: YELLOW — SIGNIFICANT CHANGE UP
CREAT SERPL-MCNC: 2.3 MG/DL — HIGH (ref 0.7–1.5)
DIFF PNL FLD: ABNORMAL
EGFR: 33 ML/MIN/1.73M2 — LOW
EOSINOPHIL # BLD AUTO: 0.14 K/UL — SIGNIFICANT CHANGE UP (ref 0–0.7)
EOSINOPHIL NFR BLD AUTO: 1.6 % — SIGNIFICANT CHANGE UP (ref 0–8)
EPI CELLS # UR: SIGNIFICANT CHANGE UP
GLUCOSE SERPL-MCNC: 116 MG/DL — HIGH (ref 70–99)
GLUCOSE UR QL: NEGATIVE MG/DL — SIGNIFICANT CHANGE UP
HCT VFR BLD CALC: 33.6 % — LOW (ref 42–52)
HGB BLD-MCNC: 12.3 G/DL — LOW (ref 14–18)
IMM GRANULOCYTES NFR BLD AUTO: 0.2 % — SIGNIFICANT CHANGE UP (ref 0.1–0.3)
KETONES UR-MCNC: 15 MG/DL
LEUKOCYTE ESTERASE UR-ACNC: NEGATIVE — SIGNIFICANT CHANGE UP
LYMPHOCYTES # BLD AUTO: 1.14 K/UL — LOW (ref 1.2–3.4)
LYMPHOCYTES # BLD AUTO: 12.7 % — LOW (ref 20.5–51.1)
MCHC RBC-ENTMCNC: 32.7 PG — HIGH (ref 27–31)
MCHC RBC-ENTMCNC: 36.6 G/DL — SIGNIFICANT CHANGE UP (ref 32–37)
MCV RBC AUTO: 89.4 FL — SIGNIFICANT CHANGE UP (ref 80–94)
MONOCYTES # BLD AUTO: 0.75 K/UL — HIGH (ref 0.1–0.6)
MONOCYTES NFR BLD AUTO: 8.4 % — SIGNIFICANT CHANGE UP (ref 1.7–9.3)
NEUTROPHILS # BLD AUTO: 6.91 K/UL — HIGH (ref 1.4–6.5)
NEUTROPHILS NFR BLD AUTO: 77 % — HIGH (ref 42.2–75.2)
NITRITE UR-MCNC: NEGATIVE — SIGNIFICANT CHANGE UP
NRBC # BLD: 0 /100 WBCS — SIGNIFICANT CHANGE UP (ref 0–0)
PH UR: 6 — SIGNIFICANT CHANGE UP (ref 5–8)
PLATELET # BLD AUTO: 421 K/UL — HIGH (ref 130–400)
PMV BLD: 8.4 FL — SIGNIFICANT CHANGE UP (ref 7.4–10.4)
POTASSIUM SERPL-MCNC: 3.8 MMOL/L — SIGNIFICANT CHANGE UP (ref 3.5–5)
POTASSIUM SERPL-SCNC: 3.8 MMOL/L — SIGNIFICANT CHANGE UP (ref 3.5–5)
PROT SERPL-MCNC: 6.1 G/DL — SIGNIFICANT CHANGE UP (ref 6–8)
PROT UR-MCNC: NEGATIVE MG/DL — SIGNIFICANT CHANGE UP
RBC # BLD: 3.76 M/UL — LOW (ref 4.7–6.1)
RBC # FLD: 11 % — LOW (ref 11.5–14.5)
RBC CASTS # UR COMP ASSIST: 4 /HPF — SIGNIFICANT CHANGE UP (ref 0–4)
SODIUM SERPL-SCNC: 124 MMOL/L — LOW (ref 135–146)
SP GR SPEC: 1.01 — SIGNIFICANT CHANGE UP (ref 1–1.03)
UROBILINOGEN FLD QL: 0.2 MG/DL — SIGNIFICANT CHANGE UP (ref 0.2–1)
WBC # BLD: 8.97 K/UL — SIGNIFICANT CHANGE UP (ref 4.8–10.8)
WBC # FLD AUTO: 8.97 K/UL — SIGNIFICANT CHANGE UP (ref 4.8–10.8)
WBC UR QL: 2 /HPF — SIGNIFICANT CHANGE UP (ref 0–5)

## 2024-09-28 PROCEDURE — 85027 COMPLETE CBC AUTOMATED: CPT

## 2024-09-28 PROCEDURE — 99285 EMERGENCY DEPT VISIT HI MDM: CPT | Mod: 25

## 2024-09-28 PROCEDURE — 36415 COLL VENOUS BLD VENIPUNCTURE: CPT

## 2024-09-28 PROCEDURE — 84133 ASSAY OF URINE POTASSIUM: CPT

## 2024-09-28 PROCEDURE — 74176 CT ABD & PELVIS W/O CONTRAST: CPT | Mod: 26,MC

## 2024-09-28 PROCEDURE — 51702 INSERT TEMP BLADDER CATH: CPT

## 2024-09-28 PROCEDURE — 83735 ASSAY OF MAGNESIUM: CPT

## 2024-09-28 PROCEDURE — 81003 URINALYSIS AUTO W/O SCOPE: CPT

## 2024-09-28 PROCEDURE — 83935 ASSAY OF URINE OSMOLALITY: CPT

## 2024-09-28 PROCEDURE — 82570 ASSAY OF URINE CREATININE: CPT

## 2024-09-28 PROCEDURE — 84540 ASSAY OF URINE/UREA-N: CPT

## 2024-09-28 PROCEDURE — 84300 ASSAY OF URINE SODIUM: CPT

## 2024-09-28 PROCEDURE — 80048 BASIC METABOLIC PNL TOTAL CA: CPT

## 2024-09-28 PROCEDURE — 84156 ASSAY OF PROTEIN URINE: CPT

## 2024-09-28 PROCEDURE — G0378: CPT

## 2024-09-28 PROCEDURE — 99222 1ST HOSP IP/OBS MODERATE 55: CPT

## 2024-09-28 PROCEDURE — 84100 ASSAY OF PHOSPHORUS: CPT

## 2024-09-28 RX ORDER — HYDROXYZINE PAMOATE 50 MG
25 CAPSULE ORAL ONCE
Refills: 0 | Status: COMPLETED | OUTPATIENT
Start: 2024-09-28 | End: 2024-09-28

## 2024-09-28 RX ORDER — IOHEXOL 350 MG/ML
30 INJECTION, SOLUTION INTRAVENOUS ONCE
Refills: 0 | Status: COMPLETED | OUTPATIENT
Start: 2024-09-28 | End: 2024-09-28

## 2024-09-28 RX ORDER — LIDOCAINE HCL 20 MG/ML
5 AMPUL (ML) INJECTION ONCE
Refills: 0 | Status: COMPLETED | OUTPATIENT
Start: 2024-09-28 | End: 2024-09-28

## 2024-09-28 RX ADMIN — IOHEXOL 30 MILLILITER(S): 350 INJECTION, SOLUTION INTRAVENOUS at 07:30

## 2024-09-28 RX ADMIN — Medication 25 MILLIGRAM(S): at 18:03

## 2024-09-28 RX ADMIN — Medication 5 MILLILITER(S): at 19:07

## 2024-09-28 NOTE — ED PROVIDER NOTE - NSICDXPASTMEDICALHX_GEN_ALL_CORE_FT
PAST MEDICAL HISTORY:  Diverticulosis     Insomnia      Benzoyl Peroxide Pregnancy And Lactation Text: This medication is Pregnancy Category C. It is unknown if benzoyl peroxide is excreted in breast milk.

## 2024-09-28 NOTE — ED PROVIDER NOTE - CADM POA URETHRAL CATHETER
Subjective:     Patient ID: Israel Cornelius is a 83 year old male.    HPI    History/Other:   He came in today for follow-up from emergency room.  He went to emergency room last week due to right-sided back pain.  According to him he has history of kidney stone and he was concerned that he might have kidney stone they did CT abdomen which was normal.  He was discharged home with ketorolac.  He finished ketorolac.  He is feeling better but he still on and off he feels pain when he bends or moves side-to-side  Review of Systems   Constitutional: Negative.    HENT: Negative.     Eyes: Negative.    Respiratory: Negative.     Cardiovascular: Negative.    Gastrointestinal: Negative.    Genitourinary: Negative.    Musculoskeletal:  Positive for back pain.   Skin: Negative.    Neurological: Negative.    Hematological: Negative.    Psychiatric/Behavioral: Negative.       Current Outpatient Medications   Medication Sig Dispense Refill    cyclobenzaprine 5 MG Oral Tab Take 1 tablet (5 mg total) by mouth nightly as needed for Muscle spasms. 20 tablet 0    lisinopril 5 MG Oral Tab Take 1 tablet (5 mg total) by mouth daily. 90 tablet 3    levothyroxine 50 MCG Oral Tab Take 1 tablet (50 mcg total) by mouth before breakfast. 90 tablet 3    glimepiride 1 MG Oral Tab Take 1 tablet (1 mg total) by mouth 3 (three) times daily. 270 tablet 1    rosuvastatin 20 MG Oral Tab Take 1 tablet (20 mg total) by mouth nightly. 90 tablet 3    metFORMIN HCl 1000 MG Oral Tab Take 1 tablet (1,000 mg total) by mouth 2 (two) times daily. 180 tablet 2    Alcohol Swabs 70 % Does not apply Pads Clean area prior to checking blood glucose daily. 100 each 0    Blood Glucose Monitoring Suppl (TRUE METRIX METER) w/Device Does not apply Kit       Blood Gluc Meter Disp-Strips Does not apply Device Code: E11.9. Checks qam. 50 each 5    NON FORMULARY FISH OIL. Patient doen't know how many mg. He takes it once  a day.      Glucose Blood (ONETOUCH ULTRA) In Vitro  Strip Dx. E11.9. Checks qam. 100 each 11    Calcium Carbonate-Vitamin D 600-200 MG-UNIT Oral Tab Take by mouth.      Multiple Vitamins-Minerals (MULTIVITAMIN OR) Take by mouth.      TRUEplus Lancets 33G Does not apply Misc TEST BLOOD SUGAR ONE TIME DAILY IN THE MORNING 100 each 11    aspirin 81 MG Oral Tab Take 1 tablet (81 mg total) by mouth daily.       Allergies:  Allergies   Allergen Reactions    Shellfish-Derived Products UNKNOWN       Past Medical History:   Diagnosis Date    Cataracts, bilateral 12-7-15    [place]     right    History of blood transfusion     5 years ago    Hypogammaglobulinemia (HCC)     mild, IgG1 and IgM; no treatment    Inguinal hernia, right 2005    Repair    Positive PPD 2003    CXR negative.     Prostate cancer (HCC)       Past Surgical History:   Procedure Laterality Date    APPENDECTOMY      CATARACT EXTRACTION W/ INTRAOCULAR LENS IMPLANT Left 2020    [facility] CE LENSX/ORA MAC TORIC OS DR. SCRUGGS  [id number] +18.50    CHOLECYSTECTOMY      COLONOSCOPY  2013    osis    COLONOSCOPY  2020    COLONOSCOPY N/A 3/4/2020    Procedure: COLONOSCOPY, POSSIBLE BIOPSY, POSSIBLE POLYPECTOMY 07642;  Surgeon: Salvador Perkins MD;  Location: [facility]    EGD  2013    stricture and large HH w Alonso erosions    INGUINAL HERNIA REPAIR Right     OTHER SURGICAL HISTORY      radical prostatectomy      Family History   Problem Relation Age of Onset    Heart Attack Father         CAD    Cancer Father         lung    Cancer Sister         colon    Other (Other) Mother         alzheimers      Social History:   Social History     Socioeconomic History    Marital status:    Tobacco Use    Smoking status: Former     Packs/day: 1     Types: Cigarettes     Quit date: 1998     Years since quittin.1    Smokeless tobacco: Never   Vaping Use    Vaping Use: Never used   Substance and Sexual Activity    Alcohol use: Yes     Comment: rarely    Drug use: No   Other Topics  Concern    Caffeine Concern Yes     Comment: coffee, 2 cups        Objective:   Physical Exam  Vitals and nursing note reviewed.   Constitutional:       Appearance: Normal appearance.   HENT:      Head: Normocephalic.   Cardiovascular:      Rate and Rhythm: Normal rate and regular rhythm.      Pulses: Normal pulses.      Heart sounds: Normal heart sounds.   Pulmonary:      Effort: Pulmonary effort is normal.      Breath sounds: Normal breath sounds.   Musculoskeletal:      Cervical back: Normal range of motion and neck supple.      Lumbar back: Spasms present. No swelling, edema, deformity, signs of trauma, lacerations, tenderness or bony tenderness. Normal range of motion. Negative right straight leg raise test and negative left straight leg raise test. No scoliosis.   Neurological:      Mental Status: He is alert.         Assessment & Plan:   1. Acute right-sided low back pain with sciatica, sciatica laterality unspecified    Careful with back. Correct lifting with legs and not with back. Turn body completely to lift something from side. Back exercises. Correct posture when sitting with feet flat on floor and back against chair and computer at eye level.  Can take Flexeril at night avoid NSAIDs, Tylenol as needed  No orders of the defined types were placed in this encounter.      Meds This Visit:  Requested Prescriptions     Signed Prescriptions Disp Refills    cyclobenzaprine 5 MG Oral Tab 20 tablet 0     Sig: Take 1 tablet (5 mg total) by mouth nightly as needed for Muscle spasms.       Imaging & Referrals:  None        No

## 2024-09-28 NOTE — ED PROVIDER NOTE - CARE PLAN
1 Principal Discharge DX:	Urinary retention  Secondary Diagnosis:	DIPTI (acute kidney injury)  Secondary Diagnosis:	Hyponatremia

## 2024-09-28 NOTE — ED PROVIDER NOTE - ATTENDING APP SHARED VISIT CONTRIBUTION OF CARE
54-year-old male past medical history of diverticulitis with colon resection 5 days ago presents for difficulty urinating since discharge yesterday.  Patient states while he was admitted he had difficulty urine and had Mondragon catheter placed twice.  Patient states catheter was removed yesterday before discharge.  Initially he was able to urinate small amounts and they were happy with that and sent him home.  Patient states that he is unable to urinate and feels distended.  No fever or chills, no nausea or vomiting, on exam patient in NAD, AAOx3, abdomen is distended, positive surgical wounds are healed well, positive mild bruising to left lower quadrant

## 2024-09-28 NOTE — ED ADULT NURSE REASSESSMENT NOTE - NS ED NURSE REASSESS COMMENT FT1
Assumed care of patient in Bed 17,   Patient alert and oriented x 4, respirations even symmetrical and unlabored on room air, Normocephalic, normal speech, abdomen nontender at this time.  patient denies pain/discomfort, reports feeling relief after gore catheter was inserted, gore in place  yellow drainage to gravity, pt provided with oral contrast staerted at 1930 pt  updated on the plan of care, Stretcher locked in lowest position, pt educated on how to call nurse, IV site flushed w/ NS. No signs of infiltration noted to site. No signs of acute distress noted, safety maintained. Comfort monitored.

## 2024-09-28 NOTE — ED PROVIDER NOTE - OBJECTIVE STATEMENT
Patient c/o urinary retention since bowel resection surgery Monday for recurrent diverticulitis. Had gore removed the other days , urinating freq small amts since, now uable to urinate

## 2024-09-28 NOTE — ED PROVIDER NOTE - CLINICAL SUMMARY MEDICAL DECISION MAKING FREE TEXT BOX
Mondragon catheter was placed and patient feels much improved.  We obtained labs which revealed patient to have DIPTI.  Previous creatinine from September 22 was 0.92.  This is likely secondary to urinary retention however patient also has low sodium.  We obtained CT scan.  There is postsurgical changes noted no acute processes seen.  Patient however results will admit this time

## 2024-09-28 NOTE — ED PROVIDER NOTE - CARDIAC, MLM
Alert/Cooperative/Awake Normal rate, regular rhythm.  Heart sounds S1, S2.  No murmurs, rubs or gallops.

## 2024-09-29 VITALS
DIASTOLIC BLOOD PRESSURE: 68 MMHG | SYSTOLIC BLOOD PRESSURE: 148 MMHG | OXYGEN SATURATION: 98 % | TEMPERATURE: 98 F | HEART RATE: 90 BPM | RESPIRATION RATE: 18 BRPM

## 2024-09-29 DIAGNOSIS — Z98.890 OTHER SPECIFIED POSTPROCEDURAL STATES: Chronic | ICD-10-CM

## 2024-09-29 DIAGNOSIS — Z90.49 ACQUIRED ABSENCE OF OTHER SPECIFIED PARTS OF DIGESTIVE TRACT: Chronic | ICD-10-CM

## 2024-09-29 LAB
ANION GAP SERPL CALC-SCNC: 8 MMOL/L — SIGNIFICANT CHANGE UP (ref 7–14)
APPEARANCE UR: CLEAR — SIGNIFICANT CHANGE UP
BILIRUB UR-MCNC: NEGATIVE — SIGNIFICANT CHANGE UP
BUN SERPL-MCNC: 6 MG/DL — LOW (ref 10–20)
CALCIUM SERPL-MCNC: 8.9 MG/DL — SIGNIFICANT CHANGE UP (ref 8.4–10.5)
CHLORIDE SERPL-SCNC: 97 MMOL/L — LOW (ref 98–110)
CO2 SERPL-SCNC: 29 MMOL/L — SIGNIFICANT CHANGE UP (ref 17–32)
COLOR SPEC: YELLOW — SIGNIFICANT CHANGE UP
CREAT ?TM UR-MCNC: 29 MG/DL — SIGNIFICANT CHANGE UP
CREAT SERPL-MCNC: 1 MG/DL — SIGNIFICANT CHANGE UP (ref 0.7–1.5)
DIFF PNL FLD: NEGATIVE — SIGNIFICANT CHANGE UP
EGFR: 90 ML/MIN/1.73M2 — SIGNIFICANT CHANGE UP
GLUCOSE SERPL-MCNC: 86 MG/DL — SIGNIFICANT CHANGE UP (ref 70–99)
GLUCOSE UR QL: NEGATIVE MG/DL — SIGNIFICANT CHANGE UP
HCT VFR BLD CALC: 31.2 % — LOW (ref 42–52)
HGB BLD-MCNC: 11.1 G/DL — LOW (ref 14–18)
KETONES UR-MCNC: NEGATIVE MG/DL — SIGNIFICANT CHANGE UP
LEUKOCYTE ESTERASE UR-ACNC: NEGATIVE — SIGNIFICANT CHANGE UP
MAGNESIUM SERPL-MCNC: 1.9 MG/DL — SIGNIFICANT CHANGE UP (ref 1.8–2.4)
MCHC RBC-ENTMCNC: 32.4 PG — HIGH (ref 27–31)
MCHC RBC-ENTMCNC: 35.6 G/DL — SIGNIFICANT CHANGE UP (ref 32–37)
MCV RBC AUTO: 91 FL — SIGNIFICANT CHANGE UP (ref 80–94)
NITRITE UR-MCNC: NEGATIVE — SIGNIFICANT CHANGE UP
NRBC # BLD: 0 /100 WBCS — SIGNIFICANT CHANGE UP (ref 0–0)
OSMOLALITY UR: 112 MOS/KG — SIGNIFICANT CHANGE UP (ref 50–1200)
PH UR: 7 — SIGNIFICANT CHANGE UP (ref 5–8)
PHOSPHATE SERPL-MCNC: 4.4 MG/DL — SIGNIFICANT CHANGE UP (ref 2.1–4.9)
PLATELET # BLD AUTO: 382 K/UL — SIGNIFICANT CHANGE UP (ref 130–400)
PMV BLD: 8.7 FL — SIGNIFICANT CHANGE UP (ref 7.4–10.4)
POTASSIUM SERPL-MCNC: 4.6 MMOL/L — SIGNIFICANT CHANGE UP (ref 3.5–5)
POTASSIUM SERPL-SCNC: 4.6 MMOL/L — SIGNIFICANT CHANGE UP (ref 3.5–5)
POTASSIUM UR-SCNC: 6 MMOL/L — SIGNIFICANT CHANGE UP
PROT ?TM UR-MCNC: <5 MG/DLG/24H — SIGNIFICANT CHANGE UP
PROT UR-MCNC: NEGATIVE MG/DL — SIGNIFICANT CHANGE UP
PROT/CREAT UR-RTO: <0.2 RATIO — SIGNIFICANT CHANGE UP (ref 0–0.2)
RBC # BLD: 3.43 M/UL — LOW (ref 4.7–6.1)
RBC # FLD: 11.3 % — LOW (ref 11.5–14.5)
SODIUM SERPL-SCNC: 134 MMOL/L — LOW (ref 135–146)
SODIUM UR-SCNC: 25 MMOL/L — SIGNIFICANT CHANGE UP
SP GR SPEC: 1 — SIGNIFICANT CHANGE UP (ref 1–1.03)
UROBILINOGEN FLD QL: 0.2 MG/DL — SIGNIFICANT CHANGE UP (ref 0.2–1)
UUN UR-MCNC: <6 MG/DL — SIGNIFICANT CHANGE UP
WBC # BLD: 6.07 K/UL — SIGNIFICANT CHANGE UP (ref 4.8–10.8)
WBC # FLD AUTO: 6.07 K/UL — SIGNIFICANT CHANGE UP (ref 4.8–10.8)

## 2024-09-29 PROCEDURE — 99239 HOSP IP/OBS DSCHRG MGMT >30: CPT

## 2024-09-29 RX ORDER — ACETAMINOPHEN 325 MG
650 TABLET ORAL EVERY 6 HOURS
Refills: 0 | Status: DISCONTINUED | OUTPATIENT
Start: 2024-09-29 | End: 2024-09-29

## 2024-09-29 RX ORDER — CHLORHEXIDINE GLUCONATE ORAL RINSE 1.2 MG/ML
1 SOLUTION DENTAL
Refills: 0 | Status: DISCONTINUED | OUTPATIENT
Start: 2024-09-29 | End: 2024-09-29

## 2024-09-29 RX ORDER — SODIUM CHLORIDE 0.9 % (FLUSH) 0.9 %
1000 SYRINGE (ML) INJECTION
Refills: 0 | Status: DISCONTINUED | OUTPATIENT
Start: 2024-09-29 | End: 2024-09-29

## 2024-09-29 RX ORDER — FAMOTIDINE 40 MG
20 TABLET ORAL DAILY
Refills: 0 | Status: DISCONTINUED | OUTPATIENT
Start: 2024-09-29 | End: 2024-09-29

## 2024-09-29 RX ORDER — TAMSULOSIN HCL 0.4 MG
0.4 CAPSULE ORAL AT BEDTIME
Refills: 0 | Status: DISCONTINUED | OUTPATIENT
Start: 2024-09-29 | End: 2024-09-29

## 2024-09-29 RX ORDER — FAMOTIDINE 40 MG
1 TABLET ORAL
Refills: 0 | DISCHARGE

## 2024-09-29 RX ORDER — ZOLPIDEM TARTRATE 5 MG
5 TABLET ORAL AT BEDTIME
Refills: 0 | Status: DISCONTINUED | OUTPATIENT
Start: 2024-09-29 | End: 2024-09-29

## 2024-09-29 RX ORDER — TAMSULOSIN HCL 0.4 MG
1 CAPSULE ORAL
Refills: 0 | DISCHARGE

## 2024-09-29 RX ADMIN — Medication 75 MILLILITER(S): at 01:43

## 2024-09-29 RX ADMIN — Medication 5000 UNIT(S): at 08:52

## 2024-09-29 RX ADMIN — Medication 500 MILLIGRAM(S): at 08:52

## 2024-09-29 RX ADMIN — Medication 650 MILLIGRAM(S): at 01:46

## 2024-09-29 RX ADMIN — Medication 20 MILLIGRAM(S): at 08:52

## 2024-09-29 RX ADMIN — Medication 650 MILLIGRAM(S): at 01:47

## 2024-09-29 RX ADMIN — CHLORHEXIDINE GLUCONATE ORAL RINSE 1 APPLICATION(S): 1.2 SOLUTION DENTAL at 08:52

## 2024-09-29 NOTE — DISCHARGE NOTE PROVIDER - NSDCCPCAREPLAN_GEN_ALL_CORE_FT
PRINCIPAL DISCHARGE DIAGNOSIS  Diagnosis: Urinary retention  Assessment and Plan of Treatment: you failed trial of void twice as outpatient postop   recommend to continue flomax   you prefered to be discharged with gore catheter  with outpatient urology follow up - Follow up with Dr. Trinh [facility] on 10/2 in the clinic - the urologist is aware and will reach out with appointment time - please call the office should you not hear from them by Tuesday      SECONDARY DISCHARGE DIAGNOSES  Diagnosis: DIPTI (acute kidney injury)  Assessment and Plan of Treatment: resolved  Cr on discharge day is 1 after the gore catheter placement    Diagnosis: Hyponatremia  Assessment and Plan of Treatment: resolved   Na on discharge day is 134 after fluids administration   follow up with primary care doctor

## 2024-09-29 NOTE — H&P ADULT - NSHPSOCIALHISTORY_GEN_ALL_CORE
Substance Use (street drugs): ( x ) never used  (  ) other:  Tobacco Usage:  ( x  ) never smoked   (   ) former smoker   (   ) current smoker  (     ) pack year  Alcohol Usage: None Substance Use (street drugs): ( x ) never used  (  ) other:  Tobacco Usage:  ( x  ) never smoked    etOH: Socially

## 2024-09-29 NOTE — DISCHARGE NOTE PROVIDER - CARE PROVIDER_API CALL
Chucky Tsang  Urology  [address] [place] [address]  Phone: [phone]  Fax: [phone]  Follow Up Time:     MARILYNN TORIBIO  [address] [place] [address]  Phone: [phone]  Fax: [phone]  Follow Up Time:     [facility] Surgery,   Phone: (   )    -  Fax: (   )    -  Follow Up Time:

## 2024-09-29 NOTE — CONSULT NOTE ADULT - ASSESSMENT
54M pmhx diverticulitis, insomnia, pshx hernia repair, s/p colon resection 9/23/24. Admitted 9/28 with urinary retention, Mondragon in place    Plan:   - case discussed with Dr. Bynum, no surgical intervention at this time   - pt concerned about removing Mondragon too soon, will have an appt with Dr. Tsang on Wednesday 10/2 to remove. Dr. Tsang recommends starting Flomax prior to d/c home  .  Impression:  54M pmhx diverticulitis, insomnia, pshx hernia repair, s/p colon resection 9/23/24.   Admitted 9/28 with urinary retention, Mondragon in place  Surgery called for CT scan findings of increased appearance of free air.        Plan:   - Case discussed with Dr. Bynum along with CT scan and physical exam findings and states can be an expected findings after recent bowel resection especially if patient is asymptomatic without abdominal pain, without fever or Leukocytosis.      - No acute surgical intervention needed at this time.   - Patient instructed to follow up with his surgeon at [facility] as scheduled for post-op follow up.      - As of note,  Pt very concerned about removing Mondragon too soon again and requesting to keep in place so will not go into retention again.  I discussed case with Dr. Tsang from Urology and he states if going to DC patient home with the Mondragon, he recommends starting him on Flomax now and continue until he can see him in the office for TOV.  Will arrange an appt with Dr. Tsang this Wednesday 10/2 in Urology clinic to remove Mondragon and I told him someone from the clinic will call him to confirm the time.   Dr. Miranda made aware of urology appointment on Wednesday.      - Patient verbalizes understanding of plan & instructions and all questions answered to patient's satisfaction.           PA Attestation Statements:  I have personally seen and examined the patient with the PA student.  I Participated in the care of this patient.  I have made amendments to the documentation where necessary, and agree with the history, physical exam, and plan as documented by the PA student.    Case also discussed with Dr. Bynum and Dr. Tsang.

## 2024-09-29 NOTE — H&P ADULT - HISTORY OF PRESENT ILLNESS
Patient is a 52yo male w/ pmhx of diverticulosis s/p bowel resection on 9/23/24 who presents complaining of difficulty urinating. States the Mondragon catheter was removed upon discharge on 9/27/24 and since then has been urinating frequently  and at small quantities as well as feeling bloated. Otherwise patient denies fever, chills, n/v/d, chest pain, SOB.

## 2024-09-29 NOTE — DISCHARGE NOTE PROVIDER - PROVIDER TOKENS
PROVIDER:[TOKEN:[[id number]]],PROVIDER:[TOKEN:[[id number]]],FREE:[LAST:[[facility]],PHONE:[(   )    -],FAX:[(   )    -]]

## 2024-09-29 NOTE — H&P ADULT - NSHPLABSRESULTS_GEN_ALL_CORE
LABS                        12.3   8.97  )-----------( 421      ( 28 Sep 2024 18:25 )             33.6         124[L]  |  89[L]  |  10  ----------------------------<  116[H]  3.8   |  24  |  2.3[H]    Ca    9.2      28 Sep 2024 18:25    TPro  6.1  /  Alb  3.6  /  TBili  0.7  /  DBili  x   /  AST  38  /  ALT  55[H]  /  AlkPhos  73      LIVER FUNCTIONS - ( 28 Sep 2024 18:25 )  Alb: 3.6 g/dL / Pro: 6.1 g/dL / ALK PHOS: 73 U/L / ALT: 55 U/L / AST: 38 U/L / GGT: x             Urinalysis Basic - ( 28 Sep 2024 18:30 )    Color: Yellow / Appearance: Clear / S.010 / pH: x  Gluc: x / Ketone: 15 mg/dL  / Bili: Negative / Urobili: 0.2 mg/dL   Blood: x / Protein: Negative mg/dL / Nitrite: Negative   Leuk Esterase: Negative / RBC: 4 /HPF / WBC 2 /HPF   Sq Epi: x / Non Sq Epi: x / Bacteria: Negative /HPF    < from: CT Abdomen and Pelvis w/ Oral Cont (24 @ 21:34) >    IMPRESSION:  1.  Interval primary sigmoid resection with end-to-end primary   anastomosis which overall appears intact no enteric contrast has not yet   reached the anastomotic site.  2.  Small to moderate intraperitoneal free air, somewhat greater than   expected for postoperative day 5 as well as small amount of ascites along   the left paracolic gutter and pelvis. Although findings could potentially   be postprocedural in nature, surgical follow-up recommended. If warranted   repeat examination with rectal contrast could be considered to assess   anastomotic integrity which should be performed pre- and postrectal   contrast.  3.  Additionally there are more circumscribed nodular appearing areas of   fluid density predominantly in the left upper quadrant adjacent to the   spleen which may represent localized seroma as though not entirely   specific  4.  Asymmetric subcutaneous and inter-muscular edema along the left   lateral abdominal wall. Inter-muscular and fascial gas along the anterior   and right abdominal wall extending into the pelvis possibly   postprocedural in etiology.  5.  Small cluster of hyperdense appearing lymph nodes in the left   retroperitoneum which are new from the prior exam, possibly reactive   though reevaluation after the acute process is resolved would be   recommended.    --- End of Report ---      MARICHUY DE LOS SANTOS MD; Attending Radiologist  This document has been electronically signed. Sep 28 2024 10:45PM    < end of copied text >

## 2024-09-29 NOTE — H&P ADULT - NS ATTEND AMEND GEN_ALL_CORE FT
Seen while in the ER (on 12/28 prior to transport to medical floor) Seen while in the ER (on 12/28 prior to transport to medical floor), old records reviewed - Patient was hospitalized here with acute diverticulitis associated with hyponatremia, improved with antibiotics and IV fluids (suspect kidney function and electrolyte abnormalities will improve with placement of Mondragon producing good urine output along with IV fluids

## 2024-09-29 NOTE — DISCHARGE NOTE PROVIDER - NSDCFUSCHEDAPPT_GEN_ALL_CORE_FT
Ernesto Muñoz  [facility] Physician [facility]  CARDIOLOGY [address] [facility]  Scheduled Appointment: 11/21/2024

## 2024-09-29 NOTE — DISCHARGE NOTE PROVIDER - HOSPITAL COURSE
52yo male w/ pmhx of diverticulosis s/p bowel resection on 9/23/24 who presents complaining of difficulty urinating.     # Urinary retention following sigmoid colon resection (at [facility] on 9/23/24)  # Hyponatremia - resolved   # DIPTI - resolved   - patient failed trial of void twice as outpatient  - offered to monitor renal function and urology evaluation inpatient - preferred to be discharged given improvement in renal function with op urology follow uop   - CTAP no hydro   - Discussed with Urology PA  - patient can see Dr. Chucky Call Urology on Wednesday 10/2/24 in Clinic for TOV   - Flomax QD until urology evaluation outpatient recommended by urology Dr. Call     # H/O Bowel resection 2/2 diverticulitis   - CT AP w / < from: CT Abdomen and Pelvis w/ Oral Cont (09.28.24 @ 21:34) >    IMPRESSION:  1.  Interval primary sigmoid resection with end-to-end primary   anastomosis which overall appears intact no enteric contrast has not yet   reached the anastomotic site.  2.  Small to moderate intraperitoneal free air, somewhat greater than   expected for postoperative day 5 as well as small amount of ascites along   the left paracolic gutter and pelvis. Although findings could potentially   be postprocedural in nature, surgical follow-up recommended. If warranted   repeat examination with rectal contrast could be considered to assess   anastomotic integrity which should be performed pre- and postrectal   contrast.  3.  Additionally there are more circumscribed nodular appearing areas of   fluid density predominantly in the left upper quadrant adjacent to the   spleen which may represent localized seroma as though not entirely   specific  4.  Asymmetric subcutaneous and inter-muscular edema along the left   lateral abdominal wall. Inter-muscular and fascial gas along the anterior   and right abdominal wall extending into the pelvis possibly   postprocedural in etiology.  5.  Small cluster of hyperdense appearing lymph nodes in the left   retroperitoneum which are new from the prior exam, possibly reactive   though reevaluation after the acute process is resolved would be   recommended.    - on exam patient abdomen is soft, non tender with + bs   - denies pain   - evaluated by surgery inpatient - op fu with pt's provider given benign physical exam and pt asymptomatic       attending attestation:  patient seen and examined on day of discharge  labs and vitals reviewed  patient has no complaints  plan of care discussed with patient/family in agreement and understanding

## 2024-09-29 NOTE — H&P ADULT - ASSESSMENT
Assessment:  Patient is a 52yo male w/ pmhx of diverticulitis s/p bowel resection on 9/23/24      Plan:    # Urinary retention following surgical operation of the bowel   # Hyponatremia  # DIPTI  Admit to inpatient level of care-med/surg  Maintain Mondragon catheter  Monitor Is and Os  Repeat BMP in AM  Continue with IV hydration  Urine studies         Above discussed with Dr. Hicks   Assessment:  Patient is a 52yo male w/ pmhx of diverticulosis s/p bowel resection on 9/23/24 who presents complaining of difficulty urinating.       Plan:    # Urinary retention following sigmoid colon resection (at [facility] on 9/23/24)  # Hyponatremia  # DIPTI  Admit to inpatient level of care-med/surg  Maintain Mondragon catheter  Monitor Is and Os  Repeat BMP in AM  Continue with IV hydration  Urine studies       Diet: CLD  VTE ppx: Heparin  GI ppx: PPI  CHG ordered.       Above discussed with Dr. Hicks

## 2024-09-29 NOTE — CONSULT NOTE ADULT - SUBJECTIVE AND OBJECTIVE BOX
HPI: Pt is 53 y/o M with a pmhx of diverticulitis, insomnia and pshx of hernia repair and bowel resection. Pt is POD #6, s/p scheduled bowel resection in [facility] on 9/23. Mondragon catheter was removed and pt discharged 9/27. Pt reports urinary frequency and bloating that progressed to urinary retention. Denies fever or chills. Pt presented in the ED 9/28 with urinary retention, Mondragon catheter placed in ED.   Pt is afebrile, WBC in ED 8.97, 6.07 this am   DIPTI in ED; Creatinine 2.3, back to 1.0 this am      Pt was admitted previously 8/22/2024 for perforated sigmoid diverticulitis, reported that it was his 4th episode of diverticulitis within the year. Discharged 8/29/2024 with a midline and IV ertapenem through 9/12 for a total of 21 days. Pt instructed to f/u with Dr. Bynum outpatient in 2 weeks but never did, went to [facility] 9/23/2024 for a scheduled bowel resection.     Surgery consulted for free air on CT;  Small to moderate intraperitoneal free air, somewhat greater than  expected for postoperative day 5 as well as small amount of ascites along the left paracolic gutter and pelvis. Although findings could potentially be postprocedural in nature, surgical follow-up recommended.            PAST MEDICAL & SURGICAL HISTORY:    Insomnia    Diverticulosis    Diverticulitis; 4-5 episodes this year    History of bowel resection; 9/23/2024    H/O hernia repair            MEDICATIONS  (STANDING):  chlorhexidine 2% Cloths 1 Application(s) Topical <User Schedule>  famotidine    Tablet 20 milliGRAM(s) Oral daily  heparin   Injectable 5000 Unit(s) SubCutaneous every 12 hours  methocarbamol 500 milliGRAM(s) Oral three times a day  sodium chloride 0.9%. 1000 milliLiter(s) (75 mL/Hr) IV Continuous <Continuous>  tamsulosin 0.4 milliGRAM(s) Oral at bedtime    MEDICATIONS  (PRN):  acetaminophen     Tablet .. 650 milliGRAM(s) Oral every 6 hours PRN Moderate Pain (4 - 6)  zolpidem 5 milliGRAM(s) Oral at bedtime PRN Insomnia        ALLERGIES:    penicillin (Unknown)        SOCIAL HISTORY:  Tobacco use: denies  Alcohol use: denies  Drug use: denies             REVIEW OF SYSTEMS:  CONSTITUTIONAL:  No weakness, fevers or chills  EYES/ENT:  No visual changes;  No vertigo or throat pain   NECK:  No pain or stiffness  RESPIRATORY:  No cough, wheezing, hemoptysis; No shortness of breath  CARDIOVASCULAR:  No chest pain or palpitations  GASTROINTESTINAL:  appropriate pain post op bowel resection, No nausea, vomiting, or hematemesis; No diarrhea or constipation. No melena or hematochezia.  GENITOURINARY: urinary frequency 9/27, now urinary retention  MUSCULOSKELETAL:  FROM all extremities, normal strength, No calf tenderness  NEUROLOGICAL:  No numbness or weakness  SKIN:  No itching, rashes        I&O's Summary    28 Sep 2024 07:01  -  29 Sep 2024 07:00  --------------------------------------------------------  IN: 0 mL / OUT: 1300 mL / NET: -1300 mL          Vital Signs Last 24 Hrs  T(C): 36.4 (29 Sep 2024 00:30), Max: 36.9 (28 Sep 2024 17:27)  T(F): 97.5 (29 Sep 2024 00:30), Max: 98.4 (28 Sep 2024 17:27)  HR: 90 (29 Sep 2024 00:30) (90 - 124)  BP: 148/68 (29 Sep 2024 00:30) (127/80 - 164/92)  RR: 18 (29 Sep 2024 00:30) (18 - 18)  SpO2: 98% (29 Sep 2024 00:30) (97% - 98%)            Physical Exam:  General:  WD, WN, conversant in NAD, resting comfortably.  Skin:  Warm dry with good color and turgor.  No rash or ulcers.   Pulmonary:   CTA B/L, Normal respiratory effort. No W/R/R.   Cardiovascular:   S1 & S2, RRR, No murmurs, rubs or gallops.  Abdominal:  (+) BS, soft, non distended, mild tenderness, No rebound, guarding or peritoneal signs.  Genitourinary:  Mondragon catheter in place  Extremities:   Normal strength, no clubbing/cyanosis/edema.  Palpable distal pulses.   Neuro:  Awake, alert & oriented x 3,  CNs II-XII grossly intact, normal sensation, no focal deficits.        LABS:                        11.1   6.07  )-----------( 382      ( 29 Sep 2024 06:50 )             31.2       09-29    134[L]  |  97[L]  |  6[L]  ----------------------------<  86  4.6   |  29  |  1.0    Ca    8.9      29 Sep 2024 06:50  Phos  4.4     09-29  Mg     1.9     09-29    TPro  6.1  /  Alb  3.6  /  TBili  0.7  /  DBili  x   /  AST  38  /  ALT  55[H]  /  AlkPhos  73  09-28          Urinalysis Basic - ( 29 Sep 2024 06:50 )    Color: x / Appearance: x / SG: x / pH: x  Gluc: 86 mg/dL / Ketone: x  / Bili: x / Urobili: x   Blood: x / Protein: x / Nitrite: x   Leuk Esterase: x / RBC: x / WBC x   Sq Epi: x / Non Sq Epi: x / Bacteria: x            RADIOLOGY:  < from: CT Abdomen and Pelvis w/ Oral Cont (09.28.24 @ 21:34) >  PROCEDURE DATE:  09/28/2024        INTERPRETATION:  CLINICAL INFORMATION: Urinary retention. Postoperative   day #5 status post colon resection for recurrent diverticulitis.    COMPARISON: 8/26/2024    CONTRAST/COMPLICATIONS:  IV Contrast: NONE  Oral Contrast: Omnipaque 300  Complications: None reported at time of study completion    PROCEDURE:  CT of the Abdomen and Pelvis was performed.  Sagittal and coronal reformats were performed.    FINDINGS:  LOWER CHEST: Bibasilar consolidations presumably atelectatic in origin   though infection cannot be entirely excluded.    LIVER: No focal abnormality within the unenhanced liver.  GALLBLADDER: Within normal limits.  SPLEEN: Within normal limits.  PANCREAS: Within normal limits.  ADRENALS: Within normal limits.  KIDNEYS/URETERS: No hydronephrosis    BLADDER: Partially compressed with Mondragon catheter. Intraluminal air   likely secondary to catheterization.  REPRODUCTIVE ORGANS: Within normal limits.    BOWEL: Partial sigmoid resection with end-to-end primary anastomosis.   Anastomosis overall appears intact however enteric contrast has not yet   reached anastomotic site. No evidence of bowel obstruction. There is   small to moderate diffuse intraperitoneal free air. Small amount of   simple appearing ascites predominantly in the pelvis and along the left   paracolic gutter. More nodular circumscribed appearing areas of fluid   density are noted in the left upper quadrant adjacent to the spleen   (series 301 image 31) may represent small seromas.  PERITONEUM/RETROPERITONEUM: As above  VESSELS: No abdominal aortic aneurysm.  LYMPH NODES: Small cluster of left-sided retroperitoneal lymph nodes   anterior tothe psoas which appear hyperdense measuring up to 1.4 cm   (series 301 image [id number]), nonspecific but appear new since the prior exam.  ABDOMINAL WALL: Moderate subcutaneous as well as intermuscular edema most   prominent along the left lateral abdominal wall. Additional subcutaneous   stranding and edema along the anterior and right abdominal wall.   Intermuscular and interfascial gas is noted in the lower right abdominal   wall. Component of these findings likely relate to postsurgical changes.  BONES: Degenerative changes.    IMPRESSION:  1.  Interval primary sigmoid resection with end-to-end primary   anastomosis which overall appears intact no enteric contrast has not yet   reached the anastomotic site.  2.  Small to moderate intraperitoneal free air, somewhat greater than   expected for postoperative day 5 as well as small amount of ascites along   the left paracolic gutter and pelvis. Although findings could potentially   be postprocedural in nature, surgical follow-up recommended. If warranted   repeat examination with rectal contrast could be considered to assess   anastomotic integrity which should be performed pre- and postrectal   contrast.  3.  Additionally there are more circumscribed nodular appearing areas of   fluid density predominantly in the left upper quadrant adjacent to the   spleen which may represent localized seroma as though not entirely   specific  4.  Asymmetric subcutaneous and inter-muscular edema along the left   lateral abdominal wall. Inter-muscular and fascial gas along the anterior   and right abdominal wall extending into the pelvis possibly   postprocedural in etiology.  5.  Small cluster of hyperdense appearing lymph nodes in the left   retroperitoneum which are new from the prior exam, possibly reactive   though reevaluation after the acute process is resolved would be   recommended.        --- End of Report ---    < end of copied text >           HPI: Pt is 53 y/o M with a pmhx of diverticulitis, insomnia and pshx of hernia repair and bowel resection. Pt is POD #6, s/p scheduled bowel resection in [facility] on 9/23. Mondragon catheter was removed and pt discharged 9/27. Pt reports urinary frequency and bloating that progressed to urinary retention. Denies fever or chills.  Pt presented in the ED 9/28 with urinary retention, Mondragon catheter placed in ED.   Pt is afebrile, WBC in ED 8.97, 6.07 this am   DIPTI in ED; Creatinine 2.3, back to 1.0 this am      Pt was admitted previously 8/22/2024 at [facility] for perforated sigmoid diverticulitis, reported that it was his 4th episode of diverticulitis within the year.  Patient was discharged 8/29/2024 with a midline and IV Ertapenem through 9/12 for a total of 21 days.   Pt instructed to f/u with Dr. Bynum outpatient in 2 weeks but he never did, went to [facility] 9/23/2024 for a scheduled elective bowel resection at recommendation of his PCP.   Patient had bowel resection with primary anastomosis at [facility] and stated he had his Mondragon removed before discharge home on 9/27/2024.  Patient reports had frequency at first but then he was unable to void yesterday so he came to [facility] ER and eventually had a Mondragon catheter replaced.      Surgery consulted today for slight increase of free air on CT scan in ER.   CT scan abd/pel with oral contrast showed Small to moderate intraperitoneal free air, somewhat greater than expected for postoperative day 5 as well as small amount of ascites along the left paracolic gutter and pelvis. Although findings could potentially be postprocedural in nature, surgical follow-up recommended.  Patient denies any abdominal pain since discharged.  Reports (+) Flatus and (+) BM x 2 yesterday.  Tolerates diet.  Patient reports he has not yet had a follow up with his surgeon at [facility].   Patient denies fever, chills, tremors, N/V/D, hematuria, dysuria, CP or SOB.   Patient with normal WBC count in ER at 8.9.              PAST MEDICAL & SURGICAL HISTORY:    Insomnia    Diverticulosis    Diverticulitis; 4-5 episodes this year    History of bowel resection; 9/23/2024 at [facility]    H/O hernia repair            MEDICATIONS  (STANDING):  chlorhexidine 2% Cloths 1 Application(s) Topical <User Schedule>  famotidine    Tablet 20 milliGRAM(s) Oral daily  heparin   Injectable 5000 Unit(s) SubCutaneous every 12 hours  methocarbamol 500 milliGRAM(s) Oral three times a day  sodium chloride 0.9%. 1000 milliLiter(s) (75 mL/Hr) IV Continuous <Continuous>  tamsulosin 0.4 milliGRAM(s) Oral at bedtime    MEDICATIONS  (PRN):  acetaminophen     Tablet .. 650 milliGRAM(s) Oral every 6 hours PRN Moderate Pain (4 - 6)  zolpidem 5 milliGRAM(s) Oral at bedtime PRN Insomnia        ALLERGIES:  Penicillin (Unknown)        SOCIAL HISTORY:  Tobacco use: denies  Alcohol use: denies  Drug use: denies             REVIEW OF SYSTEMS:  CONSTITUTIONAL:  No weakness, fevers or chills  EYES/ENT:  No visual changes;  No vertigo or throat pain   NECK:  No pain or stiffness  RESPIRATORY:  No cough, wheezing, hemoptysis; No shortness of breath  CARDIOVASCULAR:  No chest pain or palpitations  GASTROINTESTINAL:  appropriate pain post op bowel resection, No nausea, vomiting, or hematemesis; No diarrhea or constipation. No melena or hematochezia.  GENITOURINARY:  (+) Urinary frequency 9/27, now (+) Urinary retention  MUSCULOSKELETAL:  FROM all extremities, normal strength, No calf tenderness  NEUROLOGICAL:  No numbness or weakness  SKIN:  No itching, rashes          I&O's Summary    28 Sep 2024 07:01  -  29 Sep 2024 07:00  --------------------------------------------------------  IN: 0 mL / OUT: 1300 mL / NET: -1300 mL          Vital Signs Last 24 Hrs  T(C): 36.4 (29 Sep 2024 00:30), Max: 36.9 (28 Sep 2024 17:27)  T(F): 97.5 (29 Sep 2024 00:30), Max: 98.4 (28 Sep 2024 17:27)  HR: 90 (29 Sep 2024 00:30) (90 - 124)  BP: 148/68 (29 Sep 2024 00:30) (127/80 - 164/92)  RR: 18 (29 Sep 2024 00:30) (18 - 18)  SpO2: 98% (29 Sep 2024 00:30) (97% - 98%)            Physical Exam:  General:  WD, WN, conversant in NAD, resting comfortably.  Skin:  Warm dry with good color and turgor.  No rash or ulcers.   Pulmonary:   CTA B/L, Normal respiratory effort. No W/R/R.   Cardiovascular:   S1 & S2, RRR, No murmurs, rubs or gallops.  Abdominal:  (+) BS, soft, non distended, No tenderness, Incisions with SQ sutures and [place] bond glue healing well,  No erythema or DC.  No rebound, guarding or peritoneal signs.  Genitourinary:  Mondragon catheter in place draining yellow urine.  No suprapubic pain,  No CVAT.  Extremities:   Normal strength, no clubbing/cyanosis/edema.  Palpable distal pulses.   Neuro:  Awake, alert & oriented x 3,  CNs II-XII grossly intact, normal sensation, no focal deficits.          LABS:                        11.1   6.07  )-----------( 382      ( 29 Sep 2024 06:50 )             31.2       09-29    134[L]  |  97[L]  |  6[L]  ----------------------------<  86  4.6   |  29  |  1.0    Ca    8.9      29 Sep 2024 06:50  Phos  4.4     09-29  Mg     1.9     09-29    TPro  6.1  /  Alb  3.6  /  TBili  0.7  /  DBili  x   /  AST  38  /  ALT  55[H]  /  AlkPhos  73  09-28          Urinalysis Basic - ( 29 Sep 2024 06:50 )    Color: x / Appearance: x / SG: x / pH: x  Gluc: 86 mg/dL / Ketone: x  / Bili: x / Urobili: x   Blood: x / Protein: x / Nitrite: x   Leuk Esterase: x / RBC: x / WBC x   Sq Epi: x / Non Sq Epi: x / Bacteria: x            RADIOLOGY:  CT Abdomen and Pelvis w/ Oral Cont (09.28.24 @ 21:34)   PROCEDURE DATE:  09/28/2024        INTERPRETATION:  CLINICAL INFORMATION: Urinary retention. Postoperative   day #5 status post colon resection for recurrent diverticulitis.    COMPARISON: 8/26/2024    CONTRAST/COMPLICATIONS:  IV Contrast: NONE  Oral Contrast: Omnipaque 300  Complications: None reported at time of study completion    PROCEDURE:  CT of the Abdomen and Pelvis was performed.  Sagittal and coronal reformats were performed.    FINDINGS:  LOWER CHEST: Bibasilar consolidations presumably atelectatic in origin   though infection cannot be entirely excluded.    LIVER: No focal abnormality within the unenhanced liver.  GALLBLADDER: Within normal limits.  SPLEEN: Within normal limits.  PANCREAS: Within normal limits.  ADRENALS: Within normal limits.  KIDNEYS/URETERS: No hydronephrosis    BLADDER: Partially compressed with Mondragon catheter. Intraluminal air   likely secondary to catheterization.  REPRODUCTIVE ORGANS: Within normal limits.    BOWEL: Partial sigmoid resection with end-to-end primary anastomosis.   Anastomosis overall appears intact however enteric contrast has not yet   reached anastomotic site. No evidence of bowel obstruction. There is   small to moderate diffuse intraperitoneal free air. Small amount of   simple appearing ascites predominantly in the pelvis and along the left   paracolic gutter. More nodular circumscribed appearing areas of fluid   density are noted in the left upper quadrant adjacent to the spleen   (series 301 image 31) may represent small seromas.  PERITONEUM/RETROPERITONEUM: As above  VESSELS: No abdominal aortic aneurysm.  LYMPH NODES: Small cluster of left-sided retroperitoneal lymph nodes   anterior tothe psoas which appear hyperdense measuring up to 1.4 cm   (series 301 image [id number]), nonspecific but appear new since the prior exam.  ABDOMINAL WALL: Moderate subcutaneous as well as intermuscular edema most   prominent along the left lateral abdominal wall. Additional subcutaneous   stranding and edema along the anterior and right abdominal wall.   Intermuscular and interfascial gas is noted in the lower right abdominal   wall. Component of these findings likely relate to postsurgical changes.  BONES: Degenerative changes.    IMPRESSION:  1.  Interval primary sigmoid resection with end-to-end primary   anastomosis which overall appears intact no enteric contrast has not yet   reached the anastomotic site.  2.  Small to moderate intraperitoneal free air, somewhat greater than   expected for postoperative day 5 as well as small amount of ascites along   the left paracolic gutter and pelvis. Although findings could potentially   be postprocedural in nature, surgical follow-up recommended. If warranted   repeat examination with rectal contrast could be considered to assess   anastomotic integrity which should be performed pre- and postrectal   contrast.  3.  Additionally there are more circumscribed nodular appearing areas of   fluid density predominantly in the left upper quadrant adjacent to the   spleen which may represent localized seroma as though not entirely   specific  4.  Asymmetric subcutaneous and inter-muscular edema along the left   lateral abdominal wall. Inter-muscular and fascial gas along the anterior   and right abdominal wall extending into the pelvis possibly   postprocedural in etiology.  5.  Small cluster of hyperdense appearing lymph nodes in the left   retroperitoneum which are new from the prior exam, possibly reactive   though reevaluation after the acute process is resolved would be   recommended.    .

## 2024-09-29 NOTE — PATIENT PROFILE ADULT - FALL HARM RISK - UNIVERSAL INTERVENTIONS
2018 09:19 Bed in lowest position, wheels locked, appropriate side rails in place/Call bell, personal items and telephone in reach/Instruct patient to call for assistance before getting out of bed or chair/Non-slip footwear when patient is out of bed/[place] to call system/Physically safe environment - no spills, clutter or unnecessary equipment/Purposeful Proactive Rounding/Room/bathroom lighting operational, light cord in reach

## 2024-09-29 NOTE — DISCHARGE NOTE NURSING/CASE MANAGEMENT/SOCIAL WORK - PATIENT PORTAL LINK FT
You can access the FollowMyHealth Patient Portal offered by [facility] by registering at the following website: [email or web address]. By joining [employer]’s FollowMyHealth portal, you will also be able to view your health information using other applications (apps) compatible with our system.

## 2024-09-29 NOTE — DISCHARGE NOTE PROVIDER - CARE PROVIDERS DIRECT ADDRESSES
[email or web address]

## 2024-10-02 ENCOUNTER — APPOINTMENT (OUTPATIENT)
Age: 54
End: 2024-10-02
Payer: COMMERCIAL

## 2024-10-02 ENCOUNTER — OUTPATIENT (OUTPATIENT)
Dept: OUTPATIENT SERVICES | Facility: HOSPITAL | Age: 54
LOS: 1 days | End: 2024-10-02
Payer: COMMERCIAL

## 2024-10-02 VITALS
BODY MASS INDEX: 29.78 KG/M2 | WEIGHT: 208 LBS | HEIGHT: 70 IN | HEART RATE: 102 BPM | TEMPERATURE: 98.4 F | SYSTOLIC BLOOD PRESSURE: 174 MMHG | OXYGEN SATURATION: 98 % | DIASTOLIC BLOOD PRESSURE: 94 MMHG

## 2024-10-02 DIAGNOSIS — N13.8 BENIGN PROSTATIC HYPERPLASIA WITH LOWER URINARY TRACT SYMPMS: ICD-10-CM

## 2024-10-02 DIAGNOSIS — Z00.00 ENCOUNTER FOR GENERAL ADULT MEDICAL EXAMINATION WITHOUT ABNORMAL FINDINGS: ICD-10-CM

## 2024-10-02 DIAGNOSIS — Z98.890 OTHER SPECIFIED POSTPROCEDURAL STATES: Chronic | ICD-10-CM

## 2024-10-02 DIAGNOSIS — N40.1 BENIGN PROSTATIC HYPERPLASIA WITH LOWER URINARY TRACT SYMPMS: ICD-10-CM

## 2024-10-02 DIAGNOSIS — R33.9 RETENTION OF URINE, UNSPECIFIED: ICD-10-CM

## 2024-10-02 DIAGNOSIS — Z90.49 ACQUIRED ABSENCE OF OTHER SPECIFIED PARTS OF DIGESTIVE TRACT: Chronic | ICD-10-CM

## 2024-10-02 PROCEDURE — G2211 COMPLEX E/M VISIT ADD ON: CPT | Mod: NC

## 2024-10-02 PROCEDURE — 99204 OFFICE O/P NEW MOD 45 MIN: CPT

## 2024-10-02 NOTE — PLAN

## 2024-10-02 NOTE — HISTORY OF PRESENT ILLNESS
[FreeTextEntry1] : Patient is a 53 yo male s/p colorectal surgery about a month ago and developed post-up urinary retention, presents to the clinic for trial of void. Pt reports he was given a trial of void at [facility] prior to dicharge but failed. Pt taking flomax since September 28th, with no issues. Pt denies any issues of voiding prior to hospitalization or in the past. Pt denies fever, chills, N/V, abdominal pain, flank pain, dysuria, or hematuria.

## 2024-10-02 NOTE — ASSESSMENT
[FreeTextEntry1] : #Enlarged prostate/LUTS - post-op urinary retention, failed TOV x 1 - removed gore catheter without issues, TOV until 6pm  - if patient does not void by 6pm, will need to return to ED for gore placement - encouraged to hydrate  - ambulate as tolerated  - continue flomax - RBUS assess PVRs - f/u in 4-6 weeks - case discussed with Dr. Tsang   Pt understands to return to the ED should he not void at home as it would mean he requires his gore catheter replaced. He is aware urinary retention will increase his risk of infection, sepsis and renal failure.

## 2024-10-02 NOTE — PHYSICAL EXAM
[General Appearance - Well Developed] : well developed [General Appearance - Well Nourished] : well nourished [Normal Appearance] : normal appearance [Well Groomed] : well groomed [General Appearance - In No Acute Distress] : no acute distress [] : no respiratory distress [Abdomen Soft] : soft [Abdomen Tenderness] : non-tender [Costovertebral Angle Tenderness] : no ~M costovertebral angle tenderness [Penis Abnormality] : normal circumcised penis [Normal Station and Gait] : the gait and station were normal for the patient's age [Skin Color & Pigmentation] : normal skin color and pigmentation [Oriented To Time, Place, And Person] : oriented to person, place, and time [de-identified] : + 16fr gore catheter in place draining yellow urine

## 2024-10-07 DIAGNOSIS — R33.9 RETENTION OF URINE, UNSPECIFIED: ICD-10-CM

## 2024-10-07 DIAGNOSIS — Z88.0 ALLERGY STATUS TO PENICILLIN: ICD-10-CM

## 2024-10-07 DIAGNOSIS — N17.9 ACUTE KIDNEY FAILURE, UNSPECIFIED: ICD-10-CM

## 2024-10-07 DIAGNOSIS — E87.1 HYPO-OSMOLALITY AND HYPONATREMIA: ICD-10-CM

## 2024-10-10 DIAGNOSIS — N40.1 BENIGN PROSTATIC HYPERPLASIA WITH LOWER URINARY TRACT SYMPTOMS: ICD-10-CM

## 2024-10-10 DIAGNOSIS — R33.9 RETENTION OF URINE, UNSPECIFIED: ICD-10-CM

## 2024-11-13 ENCOUNTER — APPOINTMENT (OUTPATIENT)
Dept: CARDIOLOGY | Facility: CLINIC | Age: 54
End: 2024-11-13

## 2024-12-12 ENCOUNTER — APPOINTMENT (OUTPATIENT)
Dept: CARDIOLOGY | Facility: CLINIC | Age: 54
End: 2024-12-12
Payer: COMMERCIAL

## 2024-12-12 VITALS
BODY MASS INDEX: 28.2 KG/M2 | HEART RATE: 78 BPM | OXYGEN SATURATION: 98 % | WEIGHT: 197 LBS | SYSTOLIC BLOOD PRESSURE: 116 MMHG | HEIGHT: 70 IN | DIASTOLIC BLOOD PRESSURE: 76 MMHG

## 2024-12-12 DIAGNOSIS — E78.5 HYPERLIPIDEMIA, UNSPECIFIED: ICD-10-CM

## 2024-12-12 DIAGNOSIS — R06.02 SHORTNESS OF BREATH: ICD-10-CM

## 2024-12-12 DIAGNOSIS — Z00.00 ENCOUNTER FOR GENERAL ADULT MEDICAL EXAMINATION W/OUT ABNORMAL FINDINGS: ICD-10-CM

## 2024-12-12 PROCEDURE — 99214 OFFICE O/P EST MOD 30 MIN: CPT | Mod: 25

## 2024-12-12 PROCEDURE — 93000 ELECTROCARDIOGRAM COMPLETE: CPT

## 2025-01-15 ENCOUNTER — APPOINTMENT (OUTPATIENT)
Age: 55
End: 2025-01-15

## 2025-01-23 NOTE — HISTORY OF PRESENT ILLNESS
Pt's regular pharmacy is out of methylphenidate    [de-identified] : Mr. Greenberg returns today after his course of IV antibiotics.  He also saw our GI team in the office and everything has resolved.  Most of his symptoms are completely resolved except some residual exhaustion.  He is tolerating a diet and having normal bowel movements.  CT scan most recently demonstrated resolution of all of the inflammation.  He will be scheduled with GI for colonoscopy at some point in September.  We had a discussion about the natural history of diverticulitis once again and indications for surgery currently he is doing well and I do not think he necessarily requires surgical treatment.  We had an extensive discussion about diet and other aspects of his care.  All his questions were answered.  He will be returning to work April 9.  I provided a note for him for that.

## 2025-02-10 ENCOUNTER — EMERGENCY (EMERGENCY)
Facility: HOSPITAL | Age: 55
LOS: 0 days | Discharge: HOME | End: 2025-02-10
Attending: STUDENT IN AN ORGANIZED HEALTH CARE EDUCATION/TRAINING PROGRAM
Payer: COMMERCIAL

## 2025-02-10 VITALS
SYSTOLIC BLOOD PRESSURE: 169 MMHG | WEIGHT: 190.04 LBS | HEART RATE: 99 BPM | RESPIRATION RATE: 99 BRPM | TEMPERATURE: 98 F | OXYGEN SATURATION: 98 % | DIASTOLIC BLOOD PRESSURE: 90 MMHG

## 2025-02-10 DIAGNOSIS — R07.89 OTHER CHEST PAIN: ICD-10-CM

## 2025-02-10 DIAGNOSIS — Z98.890 OTHER SPECIFIED POSTPROCEDURAL STATES: Chronic | ICD-10-CM

## 2025-02-10 DIAGNOSIS — G47.00 INSOMNIA, UNSPECIFIED: ICD-10-CM

## 2025-02-10 DIAGNOSIS — R53.83 OTHER FATIGUE: ICD-10-CM

## 2025-02-10 DIAGNOSIS — R06.02 SHORTNESS OF BREATH: ICD-10-CM

## 2025-02-10 DIAGNOSIS — Z88.0 ALLERGY STATUS TO PENICILLIN: ICD-10-CM

## 2025-02-10 DIAGNOSIS — R53.1 WEAKNESS: ICD-10-CM

## 2025-02-10 DIAGNOSIS — E87.1 HYPO-OSMOLALITY AND HYPONATREMIA: ICD-10-CM

## 2025-02-10 DIAGNOSIS — Z90.49 ACQUIRED ABSENCE OF OTHER SPECIFIED PARTS OF DIGESTIVE TRACT: Chronic | ICD-10-CM

## 2025-02-10 LAB
ALBUMIN SERPL ELPH-MCNC: 4.5 G/DL — SIGNIFICANT CHANGE UP (ref 3.5–5.2)
ALP SERPL-CCNC: 70 U/L — SIGNIFICANT CHANGE UP (ref 30–115)
ALT FLD-CCNC: 18 U/L — SIGNIFICANT CHANGE UP (ref 0–41)
ANION GAP SERPL CALC-SCNC: 10 MMOL/L — SIGNIFICANT CHANGE UP (ref 7–14)
ANION GAP SERPL CALC-SCNC: 11 MMOL/L — SIGNIFICANT CHANGE UP (ref 7–14)
APPEARANCE UR: CLEAR — SIGNIFICANT CHANGE UP
AST SERPL-CCNC: 19 U/L — SIGNIFICANT CHANGE UP (ref 0–41)
BASOPHILS # BLD AUTO: 0.04 K/UL — SIGNIFICANT CHANGE UP (ref 0–0.2)
BASOPHILS NFR BLD AUTO: 0.5 % — SIGNIFICANT CHANGE UP (ref 0–1)
BILIRUB SERPL-MCNC: 0.4 MG/DL — SIGNIFICANT CHANGE UP (ref 0.2–1.2)
BILIRUB UR-MCNC: NEGATIVE — SIGNIFICANT CHANGE UP
BUN SERPL-MCNC: 10 MG/DL — SIGNIFICANT CHANGE UP (ref 10–20)
BUN SERPL-MCNC: 8 MG/DL — LOW (ref 10–20)
CALCIUM SERPL-MCNC: 9.3 MG/DL — SIGNIFICANT CHANGE UP (ref 8.4–10.5)
CALCIUM SERPL-MCNC: 9.3 MG/DL — SIGNIFICANT CHANGE UP (ref 8.4–10.5)
CHLORIDE SERPL-SCNC: 90 MMOL/L — LOW (ref 98–110)
CHLORIDE SERPL-SCNC: 93 MMOL/L — LOW (ref 98–110)
CO2 SERPL-SCNC: 23 MMOL/L — SIGNIFICANT CHANGE UP (ref 17–32)
CO2 SERPL-SCNC: 26 MMOL/L — SIGNIFICANT CHANGE UP (ref 17–32)
COLOR SPEC: YELLOW — SIGNIFICANT CHANGE UP
CREAT SERPL-MCNC: 0.8 MG/DL — SIGNIFICANT CHANGE UP (ref 0.7–1.5)
CREAT SERPL-MCNC: 0.8 MG/DL — SIGNIFICANT CHANGE UP (ref 0.7–1.5)
DIFF PNL FLD: NEGATIVE — SIGNIFICANT CHANGE UP
EGFR: 106 ML/MIN/1.73M2 — SIGNIFICANT CHANGE UP
EGFR: 106 ML/MIN/1.73M2 — SIGNIFICANT CHANGE UP
EOSINOPHIL # BLD AUTO: 0.01 K/UL — SIGNIFICANT CHANGE UP (ref 0–0.7)
EOSINOPHIL NFR BLD AUTO: 0.1 % — SIGNIFICANT CHANGE UP (ref 0–8)
GLUCOSE SERPL-MCNC: 127 MG/DL — HIGH (ref 70–99)
GLUCOSE SERPL-MCNC: 133 MG/DL — HIGH (ref 70–99)
GLUCOSE UR QL: NEGATIVE MG/DL — SIGNIFICANT CHANGE UP
HCT VFR BLD CALC: 40 % — LOW (ref 42–52)
HGB BLD-MCNC: 14.6 G/DL — SIGNIFICANT CHANGE UP (ref 14–18)
IMM GRANULOCYTES NFR BLD AUTO: 0.3 % — SIGNIFICANT CHANGE UP (ref 0.1–0.3)
KETONES UR-MCNC: NEGATIVE MG/DL — SIGNIFICANT CHANGE UP
LEUKOCYTE ESTERASE UR-ACNC: NEGATIVE — SIGNIFICANT CHANGE UP
LIDOCAIN IGE QN: 34 U/L — SIGNIFICANT CHANGE UP (ref 7–60)
LYMPHOCYTES # BLD AUTO: 1.24 K/UL — SIGNIFICANT CHANGE UP (ref 1.2–3.4)
LYMPHOCYTES # BLD AUTO: 14.3 % — LOW (ref 20.5–51.1)
MCHC RBC-ENTMCNC: 32.5 PG — HIGH (ref 27–31)
MCHC RBC-ENTMCNC: 36.5 G/DL — SIGNIFICANT CHANGE UP (ref 32–37)
MCV RBC AUTO: 89.1 FL — SIGNIFICANT CHANGE UP (ref 80–94)
MONOCYTES # BLD AUTO: 0.57 K/UL — SIGNIFICANT CHANGE UP (ref 0.1–0.6)
MONOCYTES NFR BLD AUTO: 6.6 % — SIGNIFICANT CHANGE UP (ref 1.7–9.3)
NEUTROPHILS # BLD AUTO: 6.79 K/UL — HIGH (ref 1.4–6.5)
NEUTROPHILS NFR BLD AUTO: 78.2 % — HIGH (ref 42.2–75.2)
NITRITE UR-MCNC: NEGATIVE — SIGNIFICANT CHANGE UP
NRBC # BLD: 0 /100 WBCS — SIGNIFICANT CHANGE UP (ref 0–0)
NRBC BLD-RTO: 0 /100 WBCS — SIGNIFICANT CHANGE UP (ref 0–0)
PH UR: 7 — SIGNIFICANT CHANGE UP (ref 5–8)
PLATELET # BLD AUTO: 382 K/UL — SIGNIFICANT CHANGE UP (ref 130–400)
PMV BLD: 7.9 FL — SIGNIFICANT CHANGE UP (ref 7.4–10.4)
POTASSIUM SERPL-MCNC: 4.3 MMOL/L — SIGNIFICANT CHANGE UP (ref 3.5–5)
POTASSIUM SERPL-MCNC: 4.5 MMOL/L — SIGNIFICANT CHANGE UP (ref 3.5–5)
POTASSIUM SERPL-SCNC: 4.3 MMOL/L — SIGNIFICANT CHANGE UP (ref 3.5–5)
POTASSIUM SERPL-SCNC: 4.5 MMOL/L — SIGNIFICANT CHANGE UP (ref 3.5–5)
PROT SERPL-MCNC: 6.5 G/DL — SIGNIFICANT CHANGE UP (ref 6–8)
PROT UR-MCNC: NEGATIVE MG/DL — SIGNIFICANT CHANGE UP
RBC # BLD: 4.49 M/UL — LOW (ref 4.7–6.1)
RBC # FLD: 10.8 % — LOW (ref 11.5–14.5)
SODIUM SERPL-SCNC: 124 MMOL/L — LOW (ref 135–146)
SODIUM SERPL-SCNC: 129 MMOL/L — LOW (ref 135–146)
SP GR SPEC: 1.01 — SIGNIFICANT CHANGE UP (ref 1–1.03)
TROPONIN T, HIGH SENSITIVITY RESULT: 6 NG/L — SIGNIFICANT CHANGE UP (ref 6–21)
TROPONIN T, HIGH SENSITIVITY RESULT: <6 NG/L — SIGNIFICANT CHANGE UP (ref 6–21)
UROBILINOGEN FLD QL: 0.2 MG/DL — SIGNIFICANT CHANGE UP (ref 0.2–1)
WBC # BLD: 8.68 K/UL — SIGNIFICANT CHANGE UP (ref 4.8–10.8)
WBC # FLD AUTO: 8.68 K/UL — SIGNIFICANT CHANGE UP (ref 4.8–10.8)

## 2025-02-10 PROCEDURE — 36000 PLACE NEEDLE IN VEIN: CPT

## 2025-02-10 PROCEDURE — 93005 ELECTROCARDIOGRAM TRACING: CPT

## 2025-02-10 PROCEDURE — 81003 URINALYSIS AUTO W/O SCOPE: CPT

## 2025-02-10 PROCEDURE — 80048 BASIC METABOLIC PNL TOTAL CA: CPT

## 2025-02-10 PROCEDURE — 99285 EMERGENCY DEPT VISIT HI MDM: CPT

## 2025-02-10 PROCEDURE — 84484 ASSAY OF TROPONIN QUANT: CPT

## 2025-02-10 PROCEDURE — 71046 X-RAY EXAM CHEST 2 VIEWS: CPT | Mod: 26

## 2025-02-10 PROCEDURE — 36415 COLL VENOUS BLD VENIPUNCTURE: CPT

## 2025-02-10 PROCEDURE — 99285 EMERGENCY DEPT VISIT HI MDM: CPT | Mod: 25

## 2025-02-10 PROCEDURE — 71046 X-RAY EXAM CHEST 2 VIEWS: CPT

## 2025-02-10 PROCEDURE — 80053 COMPREHEN METABOLIC PANEL: CPT

## 2025-02-10 PROCEDURE — 85025 COMPLETE CBC W/AUTO DIFF WBC: CPT

## 2025-02-10 PROCEDURE — 83690 ASSAY OF LIPASE: CPT

## 2025-02-10 PROCEDURE — 93010 ELECTROCARDIOGRAM REPORT: CPT

## 2025-02-10 RX ORDER — SODIUM CHLORIDE 9 G/ML
1000 INJECTION, SOLUTION INTRAVENOUS ONCE
Refills: 0 | Status: COMPLETED | OUTPATIENT
Start: 2025-02-10 | End: 2025-02-10

## 2025-02-10 RX ADMIN — SODIUM CHLORIDE 1000 MILLILITER(S): 9 INJECTION, SOLUTION INTRAVENOUS at 14:46

## 2025-02-10 NOTE — ED ADULT NURSE NOTE - NSFALLUNIVINTERV_ED_ALL_ED
Bed/Stretcher in lowest position, wheels locked, appropriate side rails in place/Call bell, personal items and telephone in reach/Instruct patient to call for assistance before getting out of bed/chair/stretcher/Non-slip footwear applied when patient is off stretcher/[place] to call system/Physically safe environment - no spills, clutter or unnecessary equipment/Purposeful proactive rounding/Room/bathroom lighting operational, light cord in reach

## 2025-02-10 NOTE — ED PROVIDER NOTE - IV ALTEPLASE EXCL ABS HIDDEN
Transitional Care Management Nurse attempted 2 outreach calls and was unable to connect with patient.   PCP office notified.   Final TCM attempt planned for the week of 10/7/24.  Spoke with PCP office medical assistant, Tarsha. Discussed pts status and plan of care to prevent hospital readmissions, and how to help pt maintain safety and independence at home. Discussed concern for pts ability to manage medications and manage health conditions since she is living alone. Discussed pt having specific parameters for weight, BP, HR, O2, and reminding her to call PCP or nurse line if concerns/questions/needs arise. Discussed pt needing assistance at home.  Tarsha states they have had discussions with her about needing more assistance in the past; however, the pt wants to stay independent. Tarsha agreed to discuss the above with pts provider so he is able to address these concerns with the pt at her upcoming appt.    Agnes Pedersen TCM RN, MSN  [phone]   pain, weakness on one side of the body.  · Educated pt on the s/s of fluid overload including sob, swelling, elevated BP  · Educated patient on monitoring salt intake, fluid intake/output  · Educated patient on monitoring daily weight.  · Educated pt to call provider right away if 2 pound weight gain in 24 hours or 5 pounds in one week.  · Educated patient on monitoring feet/legs for swelling and elevating to reduce fluid   · Educated the patient on monitoring BP/HR  · Educated pt she can drink a glass of water to help bring her BP as well. Encouraged her to stay hydrated. Educated pt on s/s of low BP - lightheadedness/dizziness and to be cautious when getting up to avoid falling/passing out.  · Offered educational materials - pt states she thinks she has all the materials she needs at this time.     Patient confirms receipt and review of discharge instructions from hospital stay. Denies any further questions or concerns at this time, agreeable to TCM follow up.   Next TCM appt: 10/8/24     Plan:  TCM 2  · Continue to assess and monitor for concerns  · Remind pt to call nurse line or PCP before going to the hospital, unless specific s/s occur  · Educated pt about recognizing medical emergencies and the need to go to the ED  · Chest pain or shortness of breath  · s/s of fluid overload  · Vital signs  · f/up appt outcome (PCP on 10/7)  · Interested in [facility] visit  · Interested in NP visit     Assessment completed by Agnes Pedersen TCM RN, MSN  [phone]   show

## 2025-02-10 NOTE — ED PROVIDER NOTE - PATIENT PORTAL LINK FT
You can access the FollowMyHealth Patient Portal offered by [facility] by registering at the following website: [email or web address]. By joining [employer]’s FollowMyHealth portal, you will also be able to view your health information using other applications (apps) compatible with our system.

## 2025-02-10 NOTE — ED PROVIDER NOTE - PROGRESS NOTE DETAILS
Authored by Dr. Turner: Discussed results with patient and offered admission for further evaluation of hyponatremia, but patient states he has known hyponatremia and wishes to follow up with PMD at this time. He has an appointment with PMD on Thursday. Through shared decision making with PMD and patient, decided to follow up outpatient and provided strict return precautions.

## 2025-02-10 NOTE — ED PROVIDER NOTE - CLINICAL SUMMARY MEDICAL DECISION MAKING FREE TEXT BOX
Throughout ED observation period, pt remained clinically and hemodynamically stable.  labs notable for hyponatremia - pt states his baseline is 127-130, and he has f/u w/ endo and pcp  offered admission for acs r/o, pt low risk  by heart score  serial troponins w/o significant delta, ekg unchanged, pt would rather f/u w/ PCP  will dc w/ return precautions

## 2025-02-10 NOTE — ED PROVIDER NOTE - PHYSICAL EXAMINATION
VITAL SIGNS: I have reviewed nursing notes and confirm.  CONSTITUTIONAL: well-appearing, non-toxic, NAD  SKIN: Warm dry, normal skin turgor, no acute rash, no bruising  HEAD: NCAT  EYES: EOMI, PERRLA, no scleral icterus, normal conjunctiva  ENT: Dry mucous membranes, OR clear. Normal pharynx  NECK: Supple; non tender. Full ROM. No cervical LAD  CARD: RRR, no murmurs, rubs or gallops  RESP: clear to ausculation b/l.  No rales, rhonchi, or wheezing. No increased WOB.  ABD: soft, + BS, non-tender, non-distended, no rebound or guarding. No CVA tenderness  EXT: Full ROM, no bony tenderness, pulses intact in bilateral UE and LE, no pedal edema, no calf tenderness  NEURO: normal motor. normal sensory. Normal gait.  PSYCH: Cooperative, appropriate.

## 2025-02-10 NOTE — ED PROVIDER NOTE - CARE PROVIDER_API CALL
MARILYNN TORIBIO  [address] [place] [id number]  Phone: [phone]  Fax: [phone]  Follow Up Time: Routine

## 2025-02-10 NOTE — ED PROVIDER NOTE - ATTENDING CONTRIBUTION TO CARE
54-year-old male history of insomnia  Patient for evaluation of worsening insomnia over the past 2 weeks and intermittent chest pain.  Patient has had intermittent chest pain for the past week.  Patient endorses some decrease in exertional tolerance.  Patient states he feels generally weak and tired.  Patient states that he feels thirsty but he has been hydrating well water.  No chest pain on exertion or pleuritic chest pain.  No leg pain or swelling.  No abdominal pain or vomiting.  No recent cardiac workup.    vss  gen- NAD, aaox3  card-rrr  lungs-ctab, no wheezing or rhonchi  abd-sntnd, no guarding or rebound  neuro- full str/sensation, cn ii-xii grossly intact, normal coordination  LE- no calf pain/swelling/tenderness b/l

## 2025-02-10 NOTE — ED PROVIDER NOTE - NSFOLLOWUPINSTRUCTIONS_ED_ALL_ED_FT
Our Emergency Department Referral Coordinators will be reaching out to you in the next 24-48 hours from 9:00am to 5:00pm to schedule a follow up appointment. Please expect a phone call from the hospital in that time frame. If you do not receive a call or if you have any questions or concerns, you can reach them at   [phone].    Hyponatremia  Hyponatremia is when the amount of salt (sodium) in your blood is too low. When salt levels are low, your body cells may take in extra water. This can cause swelling. The swelling often affects the brain.    What are the causes?  Certain medical problems or conditions.  Vomiting a lot.  Having watery poop (diarrhea) often.  Sweating too much.  Taking certain medicines or using illegal drugs.  Fluids given through an IV tube.  What increases the risk?  Having heart, kidney, or liver failure.  Having a medical condition that causes you to have watery poop a lot.  Doing very hard exercises.  Taking medicines that affect the amount of salt that is in your blood.  What are the signs or symptoms?  Symptoms of this condition include:  Headache.  Feeling like you may vomit (nausea).  Vomiting.  Being very tired.  Muscle weakness and cramps.  Not wanting to eat as much as normal.  Feeling weak or dizzy.  Very bad symptoms of this condition include:  Confusion.  Feeling restless.  Having a fast heart rate.  Fainting.  Seizures.  Coma.  How is this treated?  A person receiving fluids through an IV in the arm. The person is in a hospital bed.  Treatment for this condition depends on the cause. Treatment may include:  Getting fluids through an IV tube that is put into one of your veins.  Taking medicines to fix the salt levels in your blood. If medicines are causing the problem, your medicines will need to be changed.  Limiting how much water or fluid you take in, in some cases.  Monitoring in the hospital to watch your symptoms.  Follow these instructions at home:  A sign showing that a person should not drink alcohol.  Take over-the-counter and prescription medicines only as told by your doctor. Many medicines can make this condition worse. Talk with your doctor about any medicines that you are taking.  Do not drink alcohol.  Keep all follow-up visits.  Contact a doctor if:  You feel more like you may vomit.  You feel more tired.  Your headache gets worse.  You feel more confused.  You feel weaker.  Your symptoms go away and then they come back.  Get help right away if:  You have a seizure.  You faint.  You keep having watery poop.  You keep vomiting.  Summary  Hyponatremia is when the amount of salt in your blood is too low.  When salt levels are low, you can have swelling throughout the body. The swelling mostly affects the brain.  Treatment depends on the cause.  Treatment may include IV fluids and changing medicines.  This information is not intended to replace advice given to you by your health care provider. Make sure you discuss any questions you have with your health care provider.

## 2025-02-10 NOTE — ED PROVIDER NOTE - OBJECTIVE STATEMENT
54-year-old male with PMHx of insomnia on Lunesta who presents for worsening insomnia x 2 weeks and intermittent chest pain. Reports SOB while walking up a hill today. Chest pain is primarily substernal and non-radiating. States he noticed increased thirst and generalized fatigue, but is unsure if this is related to his insomnia. Follows with a cardiologist at [facility], but has not had a cardiac work up recently. No family history of cardiac events. Denies tobacco use, substance use, excessive alcohol use. No fevers, chills, cough, congestion, headache, vision changes, n/v/d, abdominal pain, urinary symptoms, weakness, leg swelling, recent travel or recent surgery.

## 2025-02-12 ENCOUNTER — NON-APPOINTMENT (OUTPATIENT)
Age: 55
End: 2025-02-12

## 2025-02-12 ENCOUNTER — APPOINTMENT (OUTPATIENT)
Dept: CARDIOLOGY | Facility: CLINIC | Age: 55
End: 2025-02-12
Payer: COMMERCIAL

## 2025-02-12 VITALS
HEIGHT: 70 IN | WEIGHT: 190 LBS | TEMPERATURE: 97.1 F | BODY MASS INDEX: 27.2 KG/M2 | DIASTOLIC BLOOD PRESSURE: 99 MMHG | HEART RATE: 97 BPM | SYSTOLIC BLOOD PRESSURE: 165 MMHG

## 2025-02-12 DIAGNOSIS — Z00.00 ENCOUNTER FOR GENERAL ADULT MEDICAL EXAMINATION W/OUT ABNORMAL FINDINGS: ICD-10-CM

## 2025-02-12 DIAGNOSIS — E78.5 HYPERLIPIDEMIA, UNSPECIFIED: ICD-10-CM

## 2025-02-12 PROCEDURE — 99214 OFFICE O/P EST MOD 30 MIN: CPT | Mod: 25

## 2025-02-12 PROCEDURE — 93000 ELECTROCARDIOGRAM COMPLETE: CPT

## 2025-02-12 RX ORDER — VALSARTAN 160 MG/1
160 TABLET, COATED ORAL DAILY
Qty: 30 | Refills: 0 | Status: ACTIVE | COMMUNITY
Start: 2025-02-11

## 2025-02-12 RX ORDER — DILTIAZEM HYDROCHLORIDE 120 MG/1
120 TABLET, EXTENDED RELEASE ORAL
Qty: 90 | Refills: 2 | Status: ACTIVE | COMMUNITY
Start: 2025-02-12 | End: 1900-01-01

## 2025-02-12 NOTE — CHART NOTE - NSCHARTNOTEFT_GEN_A_CORE
sent to [facility] 2/11 - [facility] / as per [facility], attempted to call, mailbox is full 2/12 - DK (Endocrine Referral) sent to [facility] 2/11 - [facility]K / as per [facility], attempted to call, mailbox is full 2/12 - DK / appt scheduled on 3/31 with Dr. Rivero 2/20 - DK (Endocrine Referral)

## 2025-02-14 ENCOUNTER — INPATIENT (INPATIENT)
Facility: HOSPITAL | Age: 55
LOS: 0 days | Discharge: HOME | DRG: 641 | End: 2025-02-15
Attending: STUDENT IN AN ORGANIZED HEALTH CARE EDUCATION/TRAINING PROGRAM | Admitting: INTERNAL MEDICINE
Payer: COMMERCIAL

## 2025-02-14 VITALS
SYSTOLIC BLOOD PRESSURE: 190 MMHG | OXYGEN SATURATION: 100 % | RESPIRATION RATE: 18 BRPM | HEART RATE: 88 BPM | TEMPERATURE: 98 F | DIASTOLIC BLOOD PRESSURE: 97 MMHG | WEIGHT: 190.04 LBS

## 2025-02-14 DIAGNOSIS — Z90.49 ACQUIRED ABSENCE OF OTHER SPECIFIED PARTS OF DIGESTIVE TRACT: Chronic | ICD-10-CM

## 2025-02-14 DIAGNOSIS — Z98.890 OTHER SPECIFIED POSTPROCEDURAL STATES: Chronic | ICD-10-CM

## 2025-02-14 DIAGNOSIS — E87.1 HYPO-OSMOLALITY AND HYPONATREMIA: ICD-10-CM

## 2025-02-14 LAB
ALBUMIN SERPL ELPH-MCNC: 4.3 G/DL — SIGNIFICANT CHANGE UP (ref 3.5–5.2)
ALP SERPL-CCNC: 63 U/L — SIGNIFICANT CHANGE UP (ref 30–115)
ALT FLD-CCNC: 19 U/L — SIGNIFICANT CHANGE UP (ref 0–41)
ANION GAP SERPL CALC-SCNC: 11 MMOL/L — SIGNIFICANT CHANGE UP (ref 7–14)
ANION GAP SERPL CALC-SCNC: 9 MMOL/L — SIGNIFICANT CHANGE UP (ref 7–14)
AST SERPL-CCNC: 21 U/L — SIGNIFICANT CHANGE UP (ref 0–41)
BASE EXCESS BLDV CALC-SCNC: 1.7 MMOL/L — SIGNIFICANT CHANGE UP (ref -2–3)
BASOPHILS # BLD AUTO: 0.04 K/UL — SIGNIFICANT CHANGE UP (ref 0–0.2)
BASOPHILS NFR BLD AUTO: 0.5 % — SIGNIFICANT CHANGE UP (ref 0–1)
BILIRUB SERPL-MCNC: 0.6 MG/DL — SIGNIFICANT CHANGE UP (ref 0.2–1.2)
BUN SERPL-MCNC: 7 MG/DL — LOW (ref 10–20)
BUN SERPL-MCNC: 8 MG/DL — LOW (ref 10–20)
CA-I SERPL-SCNC: 1.22 MMOL/L — SIGNIFICANT CHANGE UP (ref 1.15–1.33)
CALCIUM SERPL-MCNC: 9.2 MG/DL — SIGNIFICANT CHANGE UP (ref 8.4–10.5)
CALCIUM SERPL-MCNC: 9.5 MG/DL — SIGNIFICANT CHANGE UP (ref 8.4–10.5)
CHLORIDE SERPL-SCNC: 89 MMOL/L — LOW (ref 98–110)
CHLORIDE SERPL-SCNC: 93 MMOL/L — LOW (ref 98–110)
CO2 SERPL-SCNC: 24 MMOL/L — SIGNIFICANT CHANGE UP (ref 17–32)
CO2 SERPL-SCNC: 26 MMOL/L — SIGNIFICANT CHANGE UP (ref 17–32)
CREAT ?TM UR-MCNC: 13 MG/DL — SIGNIFICANT CHANGE UP
CREAT SERPL-MCNC: 0.7 MG/DL — SIGNIFICANT CHANGE UP (ref 0.7–1.5)
CREAT SERPL-MCNC: 0.7 MG/DL — SIGNIFICANT CHANGE UP (ref 0.7–1.5)
EGFR: 110 ML/MIN/1.73M2 — SIGNIFICANT CHANGE UP
EOSINOPHIL # BLD AUTO: 0.04 K/UL — SIGNIFICANT CHANGE UP (ref 0–0.7)
EOSINOPHIL NFR BLD AUTO: 0.5 % — SIGNIFICANT CHANGE UP (ref 0–8)
GAS PNL BLDV: 121 MMOL/L — LOW (ref 136–145)
GAS PNL BLDV: SIGNIFICANT CHANGE UP
GAS PNL BLDV: SIGNIFICANT CHANGE UP
GLUCOSE SERPL-MCNC: 116 MG/DL — HIGH (ref 70–99)
GLUCOSE SERPL-MCNC: 94 MG/DL — SIGNIFICANT CHANGE UP (ref 70–99)
HCO3 BLDV-SCNC: 27 MMOL/L — SIGNIFICANT CHANGE UP (ref 22–29)
HCT VFR BLD CALC: 38.1 % — LOW (ref 42–52)
HCT VFR BLDA CALC: 44 % — SIGNIFICANT CHANGE UP (ref 39–51)
HGB BLD CALC-MCNC: 14.5 G/DL — SIGNIFICANT CHANGE UP (ref 12.6–17.4)
HGB BLD-MCNC: 13.8 G/DL — LOW (ref 14–18)
IMM GRANULOCYTES NFR BLD AUTO: 0.1 % — SIGNIFICANT CHANGE UP (ref 0.1–0.3)
LACTATE BLDV-MCNC: 1 MMOL/L — SIGNIFICANT CHANGE UP (ref 0.5–2)
LYMPHOCYTES # BLD AUTO: 1.61 K/UL — SIGNIFICANT CHANGE UP (ref 1.2–3.4)
LYMPHOCYTES # BLD AUTO: 20.9 % — SIGNIFICANT CHANGE UP (ref 20.5–51.1)
MCHC RBC-ENTMCNC: 32 PG — HIGH (ref 27–31)
MCHC RBC-ENTMCNC: 36.2 G/DL — SIGNIFICANT CHANGE UP (ref 32–37)
MCV RBC AUTO: 88.4 FL — SIGNIFICANT CHANGE UP (ref 80–94)
MONOCYTES # BLD AUTO: 0.7 K/UL — HIGH (ref 0.1–0.6)
MONOCYTES NFR BLD AUTO: 9.1 % — SIGNIFICANT CHANGE UP (ref 1.7–9.3)
NEUTROPHILS # BLD AUTO: 5.29 K/UL — SIGNIFICANT CHANGE UP (ref 1.4–6.5)
NEUTROPHILS NFR BLD AUTO: 68.9 % — SIGNIFICANT CHANGE UP (ref 42.2–75.2)
NRBC BLD AUTO-RTO: 0 /100 WBCS — SIGNIFICANT CHANGE UP (ref 0–0)
OSMOLALITY SERPL: 259 MOS/KG — LOW (ref 275–300)
OSMOLALITY UR: 136 MOS/KG — SIGNIFICANT CHANGE UP (ref 50–1200)
PCO2 BLDV: 42 MMHG — SIGNIFICANT CHANGE UP (ref 42–55)
PH BLDV: 7.41 — SIGNIFICANT CHANGE UP (ref 7.32–7.43)
PLATELET # BLD AUTO: 359 K/UL — SIGNIFICANT CHANGE UP (ref 130–400)
PMV BLD: 7.7 FL — SIGNIFICANT CHANGE UP (ref 7.4–10.4)
PO2 BLDV: 45 MMHG — SIGNIFICANT CHANGE UP (ref 25–45)
POTASSIUM BLDV-SCNC: 3.9 MMOL/L — SIGNIFICANT CHANGE UP (ref 3.5–5.1)
POTASSIUM SERPL-MCNC: 4.3 MMOL/L — SIGNIFICANT CHANGE UP (ref 3.5–5)
POTASSIUM SERPL-MCNC: 5.2 MMOL/L — HIGH (ref 3.5–5)
POTASSIUM SERPL-SCNC: 4.3 MMOL/L — SIGNIFICANT CHANGE UP (ref 3.5–5)
POTASSIUM SERPL-SCNC: 5.2 MMOL/L — HIGH (ref 3.5–5)
PROT SERPL-MCNC: 6.3 G/DL — SIGNIFICANT CHANGE UP (ref 6–8)
RBC # BLD: 4.31 M/UL — LOW (ref 4.7–6.1)
RBC # FLD: 10.8 % — LOW (ref 11.5–14.5)
SAO2 % BLDV: 74 % — SIGNIFICANT CHANGE UP (ref 67–88)
SODIUM SERPL-SCNC: 124 MMOL/L — LOW (ref 135–146)
SODIUM SERPL-SCNC: 128 MMOL/L — LOW (ref 135–146)
SODIUM UR-SCNC: 38 MMOL/L — SIGNIFICANT CHANGE UP
TSH SERPL-MCNC: 1.47 UIU/ML — SIGNIFICANT CHANGE UP (ref 0.27–4.2)
URATE UR-MCNC: 5.2 MG/DL — SIGNIFICANT CHANGE UP
UUN UR-MCNC: <6 MG/DL — SIGNIFICANT CHANGE UP
WBC # BLD: 7.69 K/UL — SIGNIFICANT CHANGE UP (ref 4.8–10.8)
WBC # FLD AUTO: 7.69 K/UL — SIGNIFICANT CHANGE UP (ref 4.8–10.8)

## 2025-02-14 PROCEDURE — 83935 ASSAY OF URINE OSMOLALITY: CPT

## 2025-02-14 PROCEDURE — 82570 ASSAY OF URINE CREATININE: CPT

## 2025-02-14 PROCEDURE — 93010 ELECTROCARDIOGRAM REPORT: CPT

## 2025-02-14 PROCEDURE — 36415 COLL VENOUS BLD VENIPUNCTURE: CPT

## 2025-02-14 PROCEDURE — 99222 1ST HOSP IP/OBS MODERATE 55: CPT

## 2025-02-14 PROCEDURE — 84540 ASSAY OF URINE/UREA-N: CPT

## 2025-02-14 PROCEDURE — 90792 PSYCH DIAG EVAL W/MED SRVCS: CPT | Mod: 95

## 2025-02-14 PROCEDURE — 84100 ASSAY OF PHOSPHORUS: CPT

## 2025-02-14 PROCEDURE — 93005 ELECTROCARDIOGRAM TRACING: CPT

## 2025-02-14 PROCEDURE — 84300 ASSAY OF URINE SODIUM: CPT

## 2025-02-14 PROCEDURE — 83735 ASSAY OF MAGNESIUM: CPT

## 2025-02-14 PROCEDURE — 80048 BASIC METABOLIC PNL TOTAL CA: CPT

## 2025-02-14 PROCEDURE — 84443 ASSAY THYROID STIM HORMONE: CPT

## 2025-02-14 PROCEDURE — 84560 ASSAY OF URINE/URIC ACID: CPT

## 2025-02-14 PROCEDURE — 99285 EMERGENCY DEPT VISIT HI MDM: CPT

## 2025-02-14 RX ORDER — HYDROXYZINE HYDROCHLORIDE 25 MG/1
25 TABLET, FILM COATED ORAL ONCE
Refills: 0 | Status: COMPLETED | OUTPATIENT
Start: 2025-02-14 | End: 2025-02-14

## 2025-02-14 RX ORDER — MELATONIN 5 MG
5 TABLET ORAL AT BEDTIME
Refills: 0 | Status: DISCONTINUED | OUTPATIENT
Start: 2025-02-14 | End: 2025-02-15

## 2025-02-14 RX ORDER — HYDROXYZINE HYDROCHLORIDE 25 MG/1
50 TABLET, FILM COATED ORAL AT BEDTIME
Refills: 0 | Status: DISCONTINUED | OUTPATIENT
Start: 2025-02-14 | End: 2025-02-15

## 2025-02-14 RX ADMIN — Medication 1000 MILLILITER(S): at 04:10

## 2025-02-14 RX ADMIN — HYDROXYZINE HYDROCHLORIDE 25 MILLIGRAM(S): 25 TABLET, FILM COATED ORAL at 06:11

## 2025-02-14 RX ADMIN — Medication 2000 MILLILITER(S): at 03:58

## 2025-02-14 RX ADMIN — HYDROXYZINE HYDROCHLORIDE 25 MILLIGRAM(S): 25 TABLET, FILM COATED ORAL at 02:20

## 2025-02-15 ENCOUNTER — TRANSCRIPTION ENCOUNTER (OUTPATIENT)
Age: 55
End: 2025-02-15

## 2025-02-15 VITALS
OXYGEN SATURATION: 99 % | SYSTOLIC BLOOD PRESSURE: 109 MMHG | HEART RATE: 90 BPM | DIASTOLIC BLOOD PRESSURE: 58 MMHG | TEMPERATURE: 98 F

## 2025-02-15 LAB
ANION GAP SERPL CALC-SCNC: 9 MMOL/L — SIGNIFICANT CHANGE UP (ref 7–14)
BUN SERPL-MCNC: 11 MG/DL — SIGNIFICANT CHANGE UP (ref 10–20)
CALCIUM SERPL-MCNC: 9.4 MG/DL — SIGNIFICANT CHANGE UP (ref 8.4–10.5)
CHLORIDE SERPL-SCNC: 95 MMOL/L — LOW (ref 98–110)
CO2 SERPL-SCNC: 25 MMOL/L — SIGNIFICANT CHANGE UP (ref 17–32)
CREAT SERPL-MCNC: 0.9 MG/DL — SIGNIFICANT CHANGE UP (ref 0.7–1.5)
EGFR: 102 ML/MIN/1.73M2 — SIGNIFICANT CHANGE UP
EGFR: 102 ML/MIN/1.73M2 — SIGNIFICANT CHANGE UP
GLUCOSE SERPL-MCNC: 87 MG/DL — SIGNIFICANT CHANGE UP (ref 70–99)
MAGNESIUM SERPL-MCNC: 2.1 MG/DL — SIGNIFICANT CHANGE UP (ref 1.8–2.4)
PHOSPHATE SERPL-MCNC: 4.3 MG/DL — SIGNIFICANT CHANGE UP (ref 2.1–4.9)
POTASSIUM SERPL-MCNC: 4.6 MMOL/L — SIGNIFICANT CHANGE UP (ref 3.5–5)
POTASSIUM SERPL-SCNC: 4.6 MMOL/L — SIGNIFICANT CHANGE UP (ref 3.5–5)
SODIUM SERPL-SCNC: 129 MMOL/L — LOW (ref 135–146)

## 2025-02-15 PROCEDURE — 99239 HOSP IP/OBS DSCHRG MGMT >30: CPT

## 2025-02-15 PROCEDURE — 93010 ELECTROCARDIOGRAM REPORT: CPT

## 2025-02-15 RX ORDER — LORAZEPAM 4 MG/ML
2 VIAL (ML) INJECTION DAILY
Refills: 0 | Status: DISCONTINUED | OUTPATIENT
Start: 2025-02-15 | End: 2025-02-15

## 2025-02-15 RX ORDER — MELATONIN 5 MG
1 TABLET ORAL
Qty: 30 | Refills: 0
Start: 2025-02-15 | End: 2025-03-16

## 2025-02-15 RX ORDER — HYDROXYZINE HYDROCHLORIDE 25 MG/1
1 TABLET, FILM COATED ORAL
Qty: 30 | Refills: 0
Start: 2025-02-15 | End: 2025-03-16

## 2025-02-15 RX ORDER — HYDROXYZINE HYDROCHLORIDE 25 MG/1
50 TABLET, FILM COATED ORAL ONCE
Refills: 0 | Status: COMPLETED | OUTPATIENT
Start: 2025-02-15 | End: 2025-02-15

## 2025-02-15 RX ADMIN — Medication 5 MILLIGRAM(S): at 00:17

## 2025-02-15 RX ADMIN — Medication 40 MILLIGRAM(S): at 12:19

## 2025-02-15 RX ADMIN — HYDROXYZINE HYDROCHLORIDE 50 MILLIGRAM(S): 25 TABLET, FILM COATED ORAL at 00:17

## 2025-02-15 RX ADMIN — HYDROXYZINE HYDROCHLORIDE 50 MILLIGRAM(S): 25 TABLET, FILM COATED ORAL at 12:19

## 2025-02-15 RX ADMIN — Medication 160 MILLIGRAM(S): at 12:22

## 2025-02-15 RX ADMIN — Medication 2 MILLIGRAM(S): at 15:45

## 2025-02-15 RX ADMIN — Medication 5 MILLIGRAM(S): at 01:11

## 2025-02-19 DIAGNOSIS — I10 ESSENTIAL (PRIMARY) HYPERTENSION: ICD-10-CM

## 2025-02-19 DIAGNOSIS — E87.1 HYPO-OSMOLALITY AND HYPONATREMIA: ICD-10-CM

## 2025-02-19 DIAGNOSIS — G47.00 INSOMNIA, UNSPECIFIED: ICD-10-CM

## 2025-02-19 DIAGNOSIS — R63.1 POLYDIPSIA: ICD-10-CM

## 2025-02-19 DIAGNOSIS — Z88.0 ALLERGY STATUS TO PENICILLIN: ICD-10-CM

## 2025-02-19 DIAGNOSIS — F43.22 ADJUSTMENT DISORDER WITH ANXIETY: ICD-10-CM

## 2025-02-20 PROBLEM — I10 BENIGN ESSENTIAL HYPERTENSION: Status: ACTIVE | Noted: 2025-02-11

## 2025-02-21 ENCOUNTER — OUTPATIENT (OUTPATIENT)
Dept: OUTPATIENT SERVICES | Facility: HOSPITAL | Age: 55
LOS: 1 days | End: 2025-02-21
Payer: COMMERCIAL

## 2025-02-21 ENCOUNTER — APPOINTMENT (OUTPATIENT)
Dept: PSYCHIATRY | Facility: CLINIC | Age: 55
End: 2025-02-21

## 2025-02-21 DIAGNOSIS — F33.1 MAJOR DEPRESSIVE DISORDER, RECURRENT, MODERATE: ICD-10-CM

## 2025-02-21 DIAGNOSIS — Z90.49 ACQUIRED ABSENCE OF OTHER SPECIFIED PARTS OF DIGESTIVE TRACT: Chronic | ICD-10-CM

## 2025-02-21 DIAGNOSIS — F41.1 GENERALIZED ANXIETY DISORDER: ICD-10-CM

## 2025-02-21 DIAGNOSIS — Z98.890 OTHER SPECIFIED POSTPROCEDURAL STATES: Chronic | ICD-10-CM

## 2025-02-21 PROCEDURE — 90791 PSYCH DIAGNOSTIC EVALUATION: CPT

## 2025-02-22 DIAGNOSIS — F41.1 GENERALIZED ANXIETY DISORDER: ICD-10-CM

## 2025-02-22 DIAGNOSIS — F33.1 MAJOR DEPRESSIVE DISORDER, RECURRENT, MODERATE: ICD-10-CM

## 2025-02-26 DIAGNOSIS — E78.5 HYPERLIPIDEMIA, UNSPECIFIED: ICD-10-CM

## 2025-02-26 DIAGNOSIS — I10 ESSENTIAL (PRIMARY) HYPERTENSION: ICD-10-CM

## 2025-02-27 RX ORDER — HYDRALAZINE HYDROCHLORIDE 10 MG/1
10 TABLET ORAL
Qty: 90 | Refills: 0 | Status: DISCONTINUED | COMMUNITY
Start: 2025-02-20 | End: 2025-02-26

## 2025-02-27 NOTE — PROGRESS NOTE ADULT - ASSESSMENT
52 yo M w/ diverticular disease/diverticulitis presents with abdominal pain and diarrhea x 2-3 days with CT imaging showing acute sigmoid diverticulitis with possible phlegmon/developing abscess.     #Acute sigmoid diverticulitis, recurrent  CT revealing few locules of  extraluminal air along with fluid and stranding which may represent phlegmon/developing abscess  Colonoscopy in 1/2024 in [place] revealing 5mm ascending colon polyp and left sided diverticulosis (report reviewed)    -advance diet as tolerated  -Continue antibiotics - switch to PO cipro + flagyl x 14 d total on d/c  -If clinical worsening, rising leucocytosis, fever/chills within 24-48 hours recommend repeat CT abd/pelvis  -Surgery following - pt will f/u as OP   from GI standpoint, should consider sigmoidectomy leandra in light of possible complicated and recurrent attacks  -Recent colonoscopy noted, pt will resume outpt follow up with outside GI in [place]   -pt advised to also f/u w/ surgery as OP  -rest of mgmt per primary team      Time-based billing (NON-critical care).   50 minutes spent on total encounter; more than 50% of the visit was spent counseling and / or coordinating care by the attending physician.  The necessity of the time spent during the encounter on this date of service was due to: Coordination of care.  Anxious

## 2025-03-05 ENCOUNTER — APPOINTMENT (OUTPATIENT)
Dept: CARDIOLOGY | Facility: CLINIC | Age: 55
End: 2025-03-05

## 2025-03-05 RX ORDER — OLMESARTAN MEDOXOMIL 20 MG/1
20 TABLET, FILM COATED ORAL DAILY
Qty: 90 | Refills: 3 | Status: DISCONTINUED | COMMUNITY
Start: 2025-02-26 | End: 2025-03-05

## 2025-03-10 ENCOUNTER — APPOINTMENT (OUTPATIENT)
Dept: PSYCHIATRY | Facility: CLINIC | Age: 55
End: 2025-03-10

## 2025-03-10 ENCOUNTER — NON-APPOINTMENT (OUTPATIENT)
Age: 55
End: 2025-03-10

## 2025-03-13 ENCOUNTER — APPOINTMENT (OUTPATIENT)
Dept: PSYCHIATRY | Facility: CLINIC | Age: 55
End: 2025-03-13

## 2025-03-28 ENCOUNTER — APPOINTMENT (OUTPATIENT)
Dept: UROLOGY | Facility: CLINIC | Age: 55
End: 2025-03-28
Payer: COMMERCIAL

## 2025-03-28 VITALS
RESPIRATION RATE: 18 BRPM | WEIGHT: 185 LBS | SYSTOLIC BLOOD PRESSURE: 135 MMHG | OXYGEN SATURATION: 95 % | BODY MASS INDEX: 26.48 KG/M2 | HEIGHT: 70 IN | DIASTOLIC BLOOD PRESSURE: 78 MMHG | HEART RATE: 80 BPM

## 2025-03-28 DIAGNOSIS — N13.8 BENIGN PROSTATIC HYPERPLASIA WITH LOWER URINARY TRACT SYMPMS: ICD-10-CM

## 2025-03-28 DIAGNOSIS — E29.1 TESTICULAR HYPOFUNCTION: ICD-10-CM

## 2025-03-28 DIAGNOSIS — R33.9 RETENTION OF URINE, UNSPECIFIED: ICD-10-CM

## 2025-03-28 DIAGNOSIS — N40.1 BENIGN PROSTATIC HYPERPLASIA WITH LOWER URINARY TRACT SYMPMS: ICD-10-CM

## 2025-03-28 PROCEDURE — 99214 OFFICE O/P EST MOD 30 MIN: CPT

## 2025-03-28 PROCEDURE — G2211 COMPLEX E/M VISIT ADD ON: CPT | Mod: NC

## 2025-03-28 RX ORDER — TADALAFIL 5 MG/1
5 TABLET ORAL DAILY
Qty: 30 | Refills: 3 | Status: ACTIVE | COMMUNITY
Start: 2025-03-28 | End: 1900-01-01

## 2025-04-08 ENCOUNTER — EMERGENCY (EMERGENCY)
Facility: HOSPITAL | Age: 55
LOS: 0 days | Discharge: HOME | End: 2025-04-08
Attending: STUDENT IN AN ORGANIZED HEALTH CARE EDUCATION/TRAINING PROGRAM
Payer: COMMERCIAL

## 2025-04-08 VITALS — DIASTOLIC BLOOD PRESSURE: 80 MMHG | SYSTOLIC BLOOD PRESSURE: 151 MMHG | HEART RATE: 97 BPM

## 2025-04-08 VITALS
HEIGHT: 70 IN | HEART RATE: 109 BPM | TEMPERATURE: 98 F | SYSTOLIC BLOOD PRESSURE: 155 MMHG | WEIGHT: 185.19 LBS | DIASTOLIC BLOOD PRESSURE: 89 MMHG | RESPIRATION RATE: 20 BRPM | OXYGEN SATURATION: 100 %

## 2025-04-08 DIAGNOSIS — G47.00 INSOMNIA, UNSPECIFIED: ICD-10-CM

## 2025-04-08 DIAGNOSIS — Z90.49 ACQUIRED ABSENCE OF OTHER SPECIFIED PARTS OF DIGESTIVE TRACT: Chronic | ICD-10-CM

## 2025-04-08 DIAGNOSIS — E87.1 HYPO-OSMOLALITY AND HYPONATREMIA: ICD-10-CM

## 2025-04-08 DIAGNOSIS — I10 ESSENTIAL (PRIMARY) HYPERTENSION: ICD-10-CM

## 2025-04-08 DIAGNOSIS — Z98.890 OTHER SPECIFIED POSTPROCEDURAL STATES: Chronic | ICD-10-CM

## 2025-04-08 LAB
ALBUMIN SERPL ELPH-MCNC: 4.3 G/DL — SIGNIFICANT CHANGE UP (ref 3.5–5.2)
ALP SERPL-CCNC: 74 U/L — SIGNIFICANT CHANGE UP (ref 30–115)
ALT FLD-CCNC: 14 U/L — SIGNIFICANT CHANGE UP (ref 0–41)
ANION GAP SERPL CALC-SCNC: 11 MMOL/L — SIGNIFICANT CHANGE UP (ref 7–14)
AST SERPL-CCNC: 18 U/L — SIGNIFICANT CHANGE UP (ref 0–41)
BASOPHILS # BLD AUTO: 0.03 K/UL — SIGNIFICANT CHANGE UP (ref 0–0.2)
BASOPHILS NFR BLD AUTO: 0.4 % — SIGNIFICANT CHANGE UP (ref 0–1)
BILIRUB SERPL-MCNC: 0.5 MG/DL — SIGNIFICANT CHANGE UP (ref 0.2–1.2)
BUN SERPL-MCNC: 14 MG/DL — SIGNIFICANT CHANGE UP (ref 10–20)
CALCIUM SERPL-MCNC: 9.2 MG/DL — SIGNIFICANT CHANGE UP (ref 8.4–10.5)
CHLORIDE SERPL-SCNC: 93 MMOL/L — LOW (ref 98–110)
CO2 SERPL-SCNC: 23 MMOL/L — SIGNIFICANT CHANGE UP (ref 17–32)
CREAT SERPL-MCNC: 0.7 MG/DL — SIGNIFICANT CHANGE UP (ref 0.7–1.5)
EGFR: 110 ML/MIN/1.73M2 — SIGNIFICANT CHANGE UP
EGFR: 110 ML/MIN/1.73M2 — SIGNIFICANT CHANGE UP
EOSINOPHIL # BLD AUTO: 0.02 K/UL — SIGNIFICANT CHANGE UP (ref 0–0.7)
EOSINOPHIL NFR BLD AUTO: 0.3 % — SIGNIFICANT CHANGE UP (ref 0–8)
GLUCOSE SERPL-MCNC: 101 MG/DL — HIGH (ref 70–99)
HCT VFR BLD CALC: 38.7 % — LOW (ref 42–52)
HGB BLD-MCNC: 14 G/DL — SIGNIFICANT CHANGE UP (ref 14–18)
IMM GRANULOCYTES NFR BLD AUTO: 0.3 % — SIGNIFICANT CHANGE UP (ref 0.1–0.3)
LYMPHOCYTES # BLD AUTO: 1.05 K/UL — LOW (ref 1.2–3.4)
LYMPHOCYTES # BLD AUTO: 15.3 % — LOW (ref 20.5–51.1)
MCHC RBC-ENTMCNC: 33.5 PG — HIGH (ref 27–31)
MCHC RBC-ENTMCNC: 36.2 G/DL — SIGNIFICANT CHANGE UP (ref 32–37)
MCV RBC AUTO: 92.6 FL — SIGNIFICANT CHANGE UP (ref 80–94)
MONOCYTES # BLD AUTO: 0.66 K/UL — HIGH (ref 0.1–0.6)
MONOCYTES NFR BLD AUTO: 9.6 % — HIGH (ref 1.7–9.3)
NEUTROPHILS # BLD AUTO: 5.07 K/UL — SIGNIFICANT CHANGE UP (ref 1.4–6.5)
NEUTROPHILS NFR BLD AUTO: 74.1 % — SIGNIFICANT CHANGE UP (ref 42.2–75.2)
NRBC BLD AUTO-RTO: 0 /100 WBCS — SIGNIFICANT CHANGE UP (ref 0–0)
PLATELET # BLD AUTO: 388 K/UL — SIGNIFICANT CHANGE UP (ref 130–400)
PMV BLD: 8.2 FL — SIGNIFICANT CHANGE UP (ref 7.4–10.4)
POTASSIUM SERPL-MCNC: 4.7 MMOL/L — SIGNIFICANT CHANGE UP (ref 3.5–5)
POTASSIUM SERPL-SCNC: 4.7 MMOL/L — SIGNIFICANT CHANGE UP (ref 3.5–5)
PROT SERPL-MCNC: 6.7 G/DL — SIGNIFICANT CHANGE UP (ref 6–8)
RBC # BLD: 4.18 M/UL — LOW (ref 4.7–6.1)
RBC # FLD: 11 % — LOW (ref 11.5–14.5)
SODIUM SERPL-SCNC: 127 MMOL/L — LOW (ref 135–146)
WBC # BLD: 6.85 K/UL — SIGNIFICANT CHANGE UP (ref 4.8–10.8)
WBC # FLD AUTO: 6.85 K/UL — SIGNIFICANT CHANGE UP (ref 4.8–10.8)

## 2025-04-08 PROCEDURE — 99284 EMERGENCY DEPT VISIT MOD MDM: CPT | Mod: 25

## 2025-04-08 PROCEDURE — 70450 CT HEAD/BRAIN W/O DYE: CPT | Mod: 26

## 2025-04-08 PROCEDURE — 85025 COMPLETE CBC W/AUTO DIFF WBC: CPT

## 2025-04-08 PROCEDURE — 80053 COMPREHEN METABOLIC PANEL: CPT

## 2025-04-08 PROCEDURE — 99284 EMERGENCY DEPT VISIT MOD MDM: CPT

## 2025-04-08 PROCEDURE — 36415 COLL VENOUS BLD VENIPUNCTURE: CPT

## 2025-04-08 PROCEDURE — 36000 PLACE NEEDLE IN VEIN: CPT

## 2025-04-08 PROCEDURE — 70450 CT HEAD/BRAIN W/O DYE: CPT | Mod: MC

## 2025-04-08 RX ADMIN — Medication 500 MILLILITER(S): at 16:39

## 2025-04-08 NOTE — ED PROVIDER NOTE - OBJECTIVE STATEMENT
54-year-old male with past ministry of hypertension, insomnia (on lunesta), who presents for recurrence of insomnia and " brain fog" over the past month.  Patient has been seen multiple times in the emergency department for similar symptoms.  States that he has been stressed with finance and having approximately 3 hours of sleep at night.  Was recommended by his primary care to see mental health evaluation.  Denies any numbness, tingling, weakness, vision changes, fevers, chills.

## 2025-04-08 NOTE — ED PROVIDER NOTE - NSFOLLOWUPCLINICS_GEN_ALL_ED_FT
[facility]  OP [facility]  [address] [place] [id number]  Phone: [phone]  Fax:   Follow Up Time: Routine

## 2025-04-08 NOTE — ED ADULT TRIAGE NOTE - CHIEF COMPLAINT QUOTE
Pt has been having confusion and "brain fog" x 1 month, pt also having trouble sleeping, aox4 in triage

## 2025-04-08 NOTE — ED PROVIDER NOTE - CLINICAL SUMMARY MEDICAL DECISION MAKING FREE TEXT BOX
54-year-old presents today for insomnia.  Reports also having brain fog.  Reports having financial difficulties leading to increase stress and decreased sleep.  CT had unremarkable.  Sodium mildly decreased at 127.  Instructed to have mild fluid restriction.  Discharged to follow-up with PMD and psychiatry.  Denies SI/HI/auditory visual hallucinations.  Return precautions explained to patient.

## 2025-04-08 NOTE — ED PROVIDER NOTE - NSFOLLOWUPINSTRUCTIONS_ED_ALL_ED_FT
Follow up with your primary care physician.    Quality Sleep Information, Adult  Quality sleep is important for your mental and physical health. It also improves your quality of life. Quality sleep means you:  Are asleep for most of the time you are in bed.  Fall asleep within 30 minutes.  Wake up no more than once a night.  Are awake for no longer than 20 minutes if you do wake up during the night.  Most adults need 7–8 hours of quality sleep each night.    How can poor sleep affect me?  If you do not get enough quality sleep, you may have:  Mood swings.  Daytime sleepiness.  Decreased alertness, reaction time, and concentration.  Sleep disorders, such as insomnia and sleep apnea.  Difficulty with:  Solving problems.  Coping with stress.  Paying attention.  These issues may affect your performance and productivity at work, school, and home. Lack of sleep may also put you at higher risk for accidents, suicide, and risky behaviors.    If you do not get quality sleep, you may also be at higher risk for several health problems, including:  Infections.  Type 2 diabetes.  Heart disease.  High blood pressure.  Obesity.  Worsening of long-term conditions, like arthritis, kidney disease, depression, Parkinson's disease, and epilepsy.  What actions can I take to get more quality sleep?  Sleep schedule and routine    Stick to a sleep schedule. Go to sleep and wake up at about the same time each day. Do not try to sleep less on weekdays and make up for lost sleep on weekends. This does not work.  Limit naps during the day to 30 minutes or less. Do not take naps in the late afternoon.  Make time to relax before bed. Reading, listening to music, or taking a hot bath promotes quality sleep.  Make your bedroom a place that promotes quality sleep. Keep your bedroom dark, quiet, and at a comfortable room temperature. Make sure your bed is comfortable.  Avoid using electronic devices that give off bright blue light for 30 minutes before bedtime. Your brain perceives bright blue light as sunlight. This includes television, phones, and computers.  If you are lying awake in bed for longer than 20 minutes, get up and do a relaxing activity until you feel sleepy.  Lifestyle    A sign telling a person not to smoke.  A sign showing that a person should not drink alcohol.  Try to get at least 30 minutes of exercise on most days. Do not exercise 2–3 hours before going to bed.  Do not use any products that contain nicotine or tobacco. These products include cigarettes, chewing tobacco, and vaping devices, such as e-cigarettes. If you need help quitting, ask your health care provider.  Do not drink caffeinated beverages for at least 8 hours before going to bed. Coffee, tea, and some sodas contain caffeine.  Do not drink alcohol or eat large meals close to bedtime.  Try to get at least 30 minutes of sunlight every day. Morning sunlight is best.  Medical concerns    Work with your health care provider to treat medical conditions that may affect sleeping, such as:  Nasal obstruction.  Snoring.  Sleep apnea and other sleep disorders.  Talk to your health care provider if you think any of your prescription medicines may cause you to have difficulty falling or staying asleep.  If you have sleep problems, talk with a sleep consultant. If you think you have a sleep disorder, talk with your health care provider about getting evaluated by a specialist.  Where to find more information  Sleep Foundation: sleepfoundation.org  American Academy of Sleep Medicine: aasm.org  Centers for Disease Control and Prevention (CDC): cdc.gov  Contact a health care provider if:  You have trouble getting to sleep or staying asleep.  You often wake up very early in the morning and cannot get back to sleep.  You have daytime sleepiness.  You have daytime sleep attacks of suddenly falling asleep and sudden muscle weakness (narcolepsy).  You have a tingling sensation in your legs with a strong urge to move your legs (restless legs syndrome).  You stop breathing briefly during sleep (sleep apnea).  You think you have a sleep disorder or are taking a medicine that is affecting your quality of sleep.  Summary  Most adults need 7–8 hours of quality sleep each night.  Getting enough quality sleep is important for your mental and physical health.  Make your bedroom a place that promotes quality sleep, and avoid things that may cause you to have poor sleep, such as alcohol, caffeine, smoking, or large meals.  Talk to your health care provider if you have trouble falling asleep or staying asleep.  This information is not intended to replace advice given to you by your health care provider. Make sure you discuss any questions you have with your health care provider.

## 2025-04-08 NOTE — ED PROVIDER NOTE - PATIENT PORTAL LINK FT
You can access the FollowMyHealth Patient Portal offered by [facility] by registering at the following website: [email or web address]. By joining [employer]’s FollowMyHealth portal, you will also be able to view your health information using other applications (apps) compatible with our system.

## 2025-04-08 NOTE — ED PROVIDER NOTE - PROGRESS NOTE DETAILS
Huseyin Wylie, DO:   Workup with mild hyponatremia which was discussed with patient.  Advised patient for fluid restrictions, increase sodium/electrolyte intake, and follow-up with outpatient.  Patient requesting fluids for his sodium.  Discussed importance of sleep hygiene with patient.  Offered patient melatonin which he declines.  Given mental health contact information.

## 2025-04-08 NOTE — ED ADULT NURSE NOTE - NSFALLUNIVINTERV_ED_ALL_ED
Bed/Stretcher in lowest position, wheels locked, appropriate side rails in place/Call bell, personal items and telephone in reach/Instruct patient to call for assistance before getting out of bed/chair/stretcher/Non-slip footwear applied when patient is off stretcher/[place] to call system/Physically safe environment - no spills, clutter or unnecessary equipment/Purposeful proactive rounding/Room/bathroom lighting operational, light cord in reach

## 2025-04-17 ENCOUNTER — APPOINTMENT (OUTPATIENT)
Dept: CARDIOLOGY | Facility: CLINIC | Age: 55
End: 2025-04-17
Payer: COMMERCIAL

## 2025-04-17 VITALS
HEIGHT: 70 IN | HEART RATE: 69 BPM | TEMPERATURE: 98 F | WEIGHT: 181 LBS | BODY MASS INDEX: 25.91 KG/M2 | DIASTOLIC BLOOD PRESSURE: 82 MMHG | SYSTOLIC BLOOD PRESSURE: 150 MMHG

## 2025-04-17 DIAGNOSIS — E78.5 HYPERLIPIDEMIA, UNSPECIFIED: ICD-10-CM

## 2025-04-17 DIAGNOSIS — R06.02 SHORTNESS OF BREATH: ICD-10-CM

## 2025-04-17 DIAGNOSIS — I10 ESSENTIAL (PRIMARY) HYPERTENSION: ICD-10-CM

## 2025-04-17 PROCEDURE — 99214 OFFICE O/P EST MOD 30 MIN: CPT

## 2025-04-17 PROCEDURE — G2211 COMPLEX E/M VISIT ADD ON: CPT | Mod: NC

## 2025-04-17 PROCEDURE — 93000 ELECTROCARDIOGRAM COMPLETE: CPT

## 2025-04-17 RX ORDER — PROPRANOLOL HYDROCHLORIDE 20 MG/1
20 TABLET ORAL
Qty: 30 | Refills: 0 | Status: ACTIVE | COMMUNITY
Start: 2025-04-17 | End: 1900-01-01

## 2025-04-17 RX ORDER — AMLODIPINE BESYLATE 5 MG/1
5 TABLET ORAL DAILY
Qty: 90 | Refills: 3 | Status: ACTIVE | COMMUNITY
Start: 2025-04-17 | End: 1900-01-01

## 2025-04-17 RX ORDER — METOPROLOL TARTRATE 25 MG/1
25 TABLET ORAL
Qty: 2 | Refills: 0 | Status: ACTIVE | COMMUNITY
Start: 2025-04-17 | End: 1900-01-01

## 2025-05-08 ENCOUNTER — APPOINTMENT (OUTPATIENT)
Dept: UROLOGY | Facility: CLINIC | Age: 55
End: 2025-05-08

## 2025-05-13 ENCOUNTER — OUTPATIENT (OUTPATIENT)
Dept: OUTPATIENT SERVICES | Facility: HOSPITAL | Age: 55
LOS: 1 days | End: 2025-05-13
Payer: COMMERCIAL

## 2025-05-13 ENCOUNTER — RESULT REVIEW (OUTPATIENT)
Age: 55
End: 2025-05-13

## 2025-05-13 DIAGNOSIS — Z90.49 ACQUIRED ABSENCE OF OTHER SPECIFIED PARTS OF DIGESTIVE TRACT: Chronic | ICD-10-CM

## 2025-05-13 DIAGNOSIS — Z98.890 OTHER SPECIFIED POSTPROCEDURAL STATES: Chronic | ICD-10-CM

## 2025-05-13 DIAGNOSIS — Z00.8 ENCOUNTER FOR OTHER GENERAL EXAMINATION: ICD-10-CM

## 2025-05-13 DIAGNOSIS — R06.02 SHORTNESS OF BREATH: ICD-10-CM

## 2025-05-13 PROCEDURE — 75574 CT ANGIO HRT W/3D IMAGE: CPT

## 2025-05-13 PROCEDURE — 75574 CT ANGIO HRT W/3D IMAGE: CPT | Mod: 26

## 2025-05-14 DIAGNOSIS — R06.02 SHORTNESS OF BREATH: ICD-10-CM

## 2025-05-16 DIAGNOSIS — I25.10 ATHEROSCLEROTIC HEART DISEASE OF NATIVE CORONARY ARTERY W/OUT ANGINA PECTORIS: ICD-10-CM

## 2025-05-16 RX ORDER — ROSUVASTATIN CALCIUM 10 MG/1
10 TABLET, FILM COATED ORAL
Qty: 90 | Refills: 3 | Status: ACTIVE | COMMUNITY
Start: 2025-05-16 | End: 1900-01-01

## 2025-05-21 ENCOUNTER — APPOINTMENT (OUTPATIENT)
Dept: CARDIOLOGY | Facility: CLINIC | Age: 55
End: 2025-05-21

## 2025-05-22 ENCOUNTER — EMERGENCY (EMERGENCY)
Facility: HOSPITAL | Age: 55
LOS: 0 days | Discharge: HOME | End: 2025-05-22
Attending: EMERGENCY MEDICINE
Payer: COMMERCIAL

## 2025-05-22 VITALS
HEIGHT: 70 IN | DIASTOLIC BLOOD PRESSURE: 82 MMHG | TEMPERATURE: 98 F | RESPIRATION RATE: 22 BRPM | SYSTOLIC BLOOD PRESSURE: 185 MMHG | WEIGHT: 179.9 LBS | HEART RATE: 102 BPM | OXYGEN SATURATION: 99 %

## 2025-05-22 VITALS
DIASTOLIC BLOOD PRESSURE: 73 MMHG | OXYGEN SATURATION: 100 % | RESPIRATION RATE: 18 BRPM | HEART RATE: 70 BPM | SYSTOLIC BLOOD PRESSURE: 146 MMHG

## 2025-05-22 DIAGNOSIS — G47.00 INSOMNIA, UNSPECIFIED: ICD-10-CM

## 2025-05-22 DIAGNOSIS — I10 ESSENTIAL (PRIMARY) HYPERTENSION: ICD-10-CM

## 2025-05-22 DIAGNOSIS — E87.8 OTHER DISORDERS OF ELECTROLYTE AND FLUID BALANCE, NOT ELSEWHERE CLASSIFIED: ICD-10-CM

## 2025-05-22 DIAGNOSIS — E87.1 HYPO-OSMOLALITY AND HYPONATREMIA: ICD-10-CM

## 2025-05-22 DIAGNOSIS — A69.20 LYME DISEASE, UNSPECIFIED: ICD-10-CM

## 2025-05-22 DIAGNOSIS — R07.89 OTHER CHEST PAIN: ICD-10-CM

## 2025-05-22 DIAGNOSIS — Z88.0 ALLERGY STATUS TO PENICILLIN: ICD-10-CM

## 2025-05-22 LAB
ALBUMIN SERPL ELPH-MCNC: 4.5 G/DL — SIGNIFICANT CHANGE UP (ref 3.5–5.2)
ALP SERPL-CCNC: 76 U/L — SIGNIFICANT CHANGE UP (ref 30–115)
ALT FLD-CCNC: 16 U/L — SIGNIFICANT CHANGE UP (ref 0–41)
ANION GAP SERPL CALC-SCNC: 10 MMOL/L — SIGNIFICANT CHANGE UP (ref 7–14)
AST SERPL-CCNC: 19 U/L — SIGNIFICANT CHANGE UP (ref 0–41)
BASOPHILS # BLD AUTO: 0.04 K/UL — SIGNIFICANT CHANGE UP (ref 0–0.2)
BASOPHILS NFR BLD AUTO: 0.5 % — SIGNIFICANT CHANGE UP (ref 0–1)
BILIRUB SERPL-MCNC: 0.5 MG/DL — SIGNIFICANT CHANGE UP (ref 0.2–1.2)
BUN SERPL-MCNC: 12 MG/DL — SIGNIFICANT CHANGE UP (ref 10–20)
CALCIUM SERPL-MCNC: 9.4 MG/DL — SIGNIFICANT CHANGE UP (ref 8.4–10.5)
CHLORIDE SERPL-SCNC: 90 MMOL/L — LOW (ref 98–110)
CO2 SERPL-SCNC: 24 MMOL/L — SIGNIFICANT CHANGE UP (ref 17–32)
CREAT SERPL-MCNC: 0.8 MG/DL — SIGNIFICANT CHANGE UP (ref 0.7–1.5)
EGFR: 106 ML/MIN/1.73M2 — SIGNIFICANT CHANGE UP
EGFR: 106 ML/MIN/1.73M2 — SIGNIFICANT CHANGE UP
EOSINOPHIL # BLD AUTO: 0.01 K/UL — SIGNIFICANT CHANGE UP (ref 0–0.7)
EOSINOPHIL NFR BLD AUTO: 0.1 % — SIGNIFICANT CHANGE UP (ref 0–8)
GLUCOSE SERPL-MCNC: 95 MG/DL — SIGNIFICANT CHANGE UP (ref 70–99)
HCT VFR BLD CALC: 39.3 % — LOW (ref 42–52)
HGB BLD-MCNC: 14.5 G/DL — SIGNIFICANT CHANGE UP (ref 14–18)
IMM GRANULOCYTES NFR BLD AUTO: 0.4 % — HIGH (ref 0.1–0.3)
LYMPHOCYTES # BLD AUTO: 0.94 K/UL — LOW (ref 1.2–3.4)
LYMPHOCYTES # BLD AUTO: 11.8 % — LOW (ref 20.5–51.1)
MAGNESIUM SERPL-MCNC: 1.9 MG/DL — SIGNIFICANT CHANGE UP (ref 1.8–2.4)
MCHC RBC-ENTMCNC: 34 PG — HIGH (ref 27–31)
MCHC RBC-ENTMCNC: 36.9 G/DL — SIGNIFICANT CHANGE UP (ref 32–37)
MCV RBC AUTO: 92 FL — SIGNIFICANT CHANGE UP (ref 80–94)
MONOCYTES # BLD AUTO: 0.63 K/UL — HIGH (ref 0.1–0.6)
MONOCYTES NFR BLD AUTO: 7.9 % — SIGNIFICANT CHANGE UP (ref 1.7–9.3)
NEUTROPHILS # BLD AUTO: 6.31 K/UL — SIGNIFICANT CHANGE UP (ref 1.4–6.5)
NEUTROPHILS NFR BLD AUTO: 79.3 % — HIGH (ref 42.2–75.2)
NRBC BLD AUTO-RTO: 0 /100 WBCS — SIGNIFICANT CHANGE UP (ref 0–0)
PLATELET # BLD AUTO: 403 K/UL — HIGH (ref 130–400)
PMV BLD: 7.7 FL — SIGNIFICANT CHANGE UP (ref 7.4–10.4)
POTASSIUM SERPL-MCNC: 4.6 MMOL/L — SIGNIFICANT CHANGE UP (ref 3.5–5)
POTASSIUM SERPL-SCNC: 4.6 MMOL/L — SIGNIFICANT CHANGE UP (ref 3.5–5)
PROT SERPL-MCNC: 7 G/DL — SIGNIFICANT CHANGE UP (ref 6–8)
RBC # BLD: 4.27 M/UL — LOW (ref 4.7–6.1)
RBC # FLD: 10.7 % — LOW (ref 11.5–14.5)
SODIUM SERPL-SCNC: 124 MMOL/L — LOW (ref 135–146)
TROPONIN T, HIGH SENSITIVITY RESULT: 6 NG/L — SIGNIFICANT CHANGE UP (ref 6–21)
TROPONIN T, HIGH SENSITIVITY RESULT: <6 NG/L — SIGNIFICANT CHANGE UP (ref 6–21)
WBC # BLD: 7.96 K/UL — SIGNIFICANT CHANGE UP (ref 4.8–10.8)
WBC # FLD AUTO: 7.96 K/UL — SIGNIFICANT CHANGE UP (ref 4.8–10.8)

## 2025-05-22 PROCEDURE — 36000 PLACE NEEDLE IN VEIN: CPT

## 2025-05-22 PROCEDURE — 36415 COLL VENOUS BLD VENIPUNCTURE: CPT

## 2025-05-22 PROCEDURE — 71046 X-RAY EXAM CHEST 2 VIEWS: CPT

## 2025-05-22 PROCEDURE — 84484 ASSAY OF TROPONIN QUANT: CPT

## 2025-05-22 PROCEDURE — 93005 ELECTROCARDIOGRAM TRACING: CPT

## 2025-05-22 PROCEDURE — 99285 EMERGENCY DEPT VISIT HI MDM: CPT | Mod: 25

## 2025-05-22 PROCEDURE — 80053 COMPREHEN METABOLIC PANEL: CPT

## 2025-05-22 PROCEDURE — 71046 X-RAY EXAM CHEST 2 VIEWS: CPT | Mod: 26

## 2025-05-22 PROCEDURE — 93010 ELECTROCARDIOGRAM REPORT: CPT

## 2025-05-22 PROCEDURE — 99285 EMERGENCY DEPT VISIT HI MDM: CPT

## 2025-05-22 PROCEDURE — 83735 ASSAY OF MAGNESIUM: CPT

## 2025-05-22 PROCEDURE — 85025 COMPLETE CBC W/AUTO DIFF WBC: CPT

## 2025-05-22 RX ADMIN — Medication 1000 MILLILITER(S): at 16:03

## 2025-05-22 NOTE — ED PROVIDER NOTE - CARE PROVIDER_API CALL
[address]
Ernesto Muñoz  Cardiovascular Disease  [address] [place] [address], [address]  [place] [address]  Phone: [phone]  Fax: [phone]  Follow Up Time:     MARILYNN TORIBIO  [address] [place] [address]  Phone: [phone]  Fax: [phone]  Follow Up Time:

## 2025-05-22 NOTE — ED PROVIDER NOTE - PATIENT PORTAL LINK FT
You can access the FollowMyHealth Patient Portal offered by [facility] by registering at the following website: [email or web address]. By joining [employer]’s FollowMyHealth portal, you will also be able to view your health information using other applications (apps) compatible with our system.

## 2025-05-22 NOTE — ED ADULT NURSE NOTE - NSFALLUNIVINTERV_ED_ALL_ED
Bed/Stretcher in lowest position, wheels locked, appropriate side rails in place/Call bell, personal items and telephone in reach/Instruct patient to call for assistance before getting out of bed/chair/stretcher/Non-slip footwear applied when patient is off stretcher/[place] to call system/Physically safe environment - no spills, clutter or unnecessary equipment/Purposeful proactive rounding/Room/bathroom lighting operational, light cord in reach

## 2025-05-22 NOTE — ED PROVIDER NOTE - ATTENDING CONTRIBUTION TO CARE
Patient is a 54-year-old male who was seen and examined with the resident.  The patient has a past medical history of hypertension and insomnia and chest pain.  He had a CCTA on May 13 of the month with the C RADS score of 2.  The patient is complaining left-sided chest pain and episodes of high blood pressure any feels tachycardic when he walks up steps.  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  He has been on multiple medications for his hypertension in the past.  He consistently takes valsartan for his blood pressure but he needs to be on a second blood pressure medication.  His cardiologist is determining which second blood pressure medication to take.  He did not tolerate nifedipine.  No syncope.  No family history of cardiac disease.  No leg swelling.    On our exam, heart regular rate rhythm no murmurs, lungs clear to auscultation.  EKG is similar to prior EKG.  CCTA results reviewed.    Will check cardiac enzymes.  The patient's had a 2-day Holter monitor in the past but he may benefit in the future with a 3-week event monitor.

## 2025-05-22 NOTE — ED PROVIDER NOTE - OBJECTIVE STATEMENT
54-year-old male PMH HTN, insomnia on Lunesta presents to the ED for evaluation of left-sided chest discomfort and hypertension for several weeks.  Patient follows with cardiologist Dr. Muñoz, had a CCTA done on May 13 with CAD-RADS 2.  Patient also endorses he has been feeling tachycardic every time he walks and as a result has been less active.  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  Reports he has been on multiple medications to manage his blood pressure in the past however has had various side effects to these medications, is presently supposed to be taking valsartan and nifedipine.  Patient states he consistently takes his valsartan however states he was unable to tolerate nifedipine.  Patient states he is currently being worked up for Lyme disease, was supposed to see his infectious disease doctor today however did not go to the appointment.  Also states he wore a Holter monitor in the past, which showed no results.  Patient denies any associated dizziness, syncope/near syncope, shortness of breath, diaphoresis, nausea or leg swelling.

## 2025-05-22 NOTE — ED PROVIDER NOTE - CARE PROVIDERS DIRECT ADDRESSES
[email or web address]

## 2025-05-22 NOTE — ED PROVIDER NOTE - CLINICAL SUMMARY MEDICAL DECISION MAKING FREE TEXT BOX
Patient is a 54-year-old male who was seen and examined with the resident.  The patient has a past medical history of hypertension and insomnia and chest pain.  He had a CCTA on May 13 of the month with the C RADS score of 2.  The patient is complaining left-sided chest pain and episodes of high blood pressure any feels tachycardic when he walks up steps.  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  He has been on multiple medications for his hypertension in the past.  He consistently takes valsartan for his blood pressure but he needs to be on a second blood pressure medication.  His cardiologist is determining which second blood pressure medication to take.  He did not tolerate nifedipine.  No syncope.  No family history of cardiac disease.  No leg swelling.    On our exam, heart regular rate rhythm no murmurs, lungs clear to auscultation.  EKG is similar to prior EKG.  CCTA results reviewed.    Checked 2 sets cardiac enzymes.  The patient's had a 2-day Holter monitor in the past but he may benefit in the future with a 3-week event monitor.  CXR NAD  Discharged home and recommend cardiology f/u

## 2025-05-22 NOTE — ED PROVIDER NOTE - PHYSICAL EXAMINATION
VITAL SIGNS: I have reviewed nursing notes and confirm.  CONSTITUTIONAL: anxious-appearing, non-toxic, NAD  SKIN: Warm dry  HEAD: NCAT  EYES: EOMI, PERRL  ENT: Moist mucous membranes  NECK: Supple  CARD: RRR  RESP: clear to ausculation b/l.  No rales, rhonchi, or wheezing.  ABD: soft, non-tender, non-distended  EXT: Full ROM, no pedal edema, no calf tenderness  NEURO: A&O x3. CN II-XII grossly intact. Normal gait.  PSYCH: Cooperative, appropriate.

## 2025-05-22 NOTE — ED PROVIDER NOTE - PROGRESS NOTE DETAILS
Initial troponin 6, will repeat.  Sodium 124, chloride 92; patient has been hyponatremic and hypochloremic in the past and has been evaluated by nephrology.  NS ordered. Repeat troponin less than 6.  Results discussed with patient.  Will DC patient home, patient recommended to follow-up with his cardiologist and PMD.  Return precautions given.

## 2025-05-23 ENCOUNTER — APPOINTMENT (OUTPATIENT)
Dept: NEUROLOGY | Facility: CLINIC | Age: 55
End: 2025-05-23
Payer: COMMERCIAL

## 2025-05-23 VITALS
HEART RATE: 72 BPM | SYSTOLIC BLOOD PRESSURE: 161 MMHG | WEIGHT: 180 LBS | HEIGHT: 70 IN | DIASTOLIC BLOOD PRESSURE: 85 MMHG | BODY MASS INDEX: 25.77 KG/M2

## 2025-05-23 DIAGNOSIS — E87.1 HYPO-OSMOLALITY AND HYPONATREMIA: ICD-10-CM

## 2025-05-23 DIAGNOSIS — R25.3 FASCICULATION: ICD-10-CM

## 2025-05-23 DIAGNOSIS — R49.8 OTHER VOICE AND RESONANCE DISORDERS: ICD-10-CM

## 2025-05-23 PROCEDURE — G2211 COMPLEX E/M VISIT ADD ON: CPT | Mod: NC

## 2025-05-23 PROCEDURE — 99204 OFFICE O/P NEW MOD 45 MIN: CPT

## 2025-05-23 RX ORDER — VALSARTAN 80 MG/1
80 TABLET, COATED ORAL
Refills: 0 | Status: ACTIVE | COMMUNITY

## 2025-05-23 RX ORDER — NEBIVOLOL 5 MG/1
5 TABLET ORAL
Refills: 0 | Status: ACTIVE | COMMUNITY

## 2025-05-23 RX ORDER — ZOLPIDEM TARTRATE 12.5 MG/1
12.5 TABLET, COATED ORAL
Refills: 0 | Status: ACTIVE | COMMUNITY

## 2025-05-27 ENCOUNTER — NON-APPOINTMENT (OUTPATIENT)
Age: 55
End: 2025-05-27

## 2025-05-28 DIAGNOSIS — R00.2 PALPITATIONS: ICD-10-CM

## 2025-05-28 DIAGNOSIS — I10 ESSENTIAL (PRIMARY) HYPERTENSION: ICD-10-CM

## 2025-05-28 RX ORDER — METOPROLOL SUCCINATE 25 MG/1
25 TABLET, EXTENDED RELEASE ORAL DAILY
Qty: 30 | Refills: 0 | Status: DISCONTINUED | COMMUNITY
Start: 2025-05-28 | End: 2025-05-28

## 2025-05-28 RX ORDER — METOPROLOL TARTRATE 25 MG/1
25 TABLET ORAL
Qty: 180 | Refills: 3 | Status: ACTIVE | COMMUNITY
Start: 2025-05-28 | End: 1900-01-01

## 2025-05-28 RX ORDER — NIFEDIPINE 30 MG/1
30 TABLET, EXTENDED RELEASE ORAL DAILY
Qty: 90 | Refills: 3 | Status: DISCONTINUED | COMMUNITY
Start: 2025-05-19 | End: 2025-05-28

## 2025-05-28 RX ORDER — GUANFACINE 1 MG/1
1 TABLET, EXTENDED RELEASE ORAL
Qty: 30 | Refills: 0 | Status: ACTIVE | COMMUNITY
Start: 2025-05-28 | End: 1900-01-01

## 2025-06-11 ENCOUNTER — APPOINTMENT (OUTPATIENT)
Dept: NEUROLOGY | Facility: CLINIC | Age: 55
End: 2025-06-11

## 2025-06-12 RX ORDER — VALSARTAN 40 MG/1
40 TABLET, COATED ORAL
Qty: 90 | Refills: 3 | Status: ACTIVE | COMMUNITY
Start: 2025-06-11 | End: 1900-01-01

## 2025-06-18 ENCOUNTER — APPOINTMENT (OUTPATIENT)
Dept: NEUROLOGY | Facility: CLINIC | Age: 55
End: 2025-06-18
Payer: COMMERCIAL

## 2025-06-18 PROCEDURE — 95911 NRV CNDJ TEST 9-10 STUDIES: CPT

## 2025-06-18 PROCEDURE — 95886 MUSC TEST DONE W/N TEST COMP: CPT

## 2025-06-25 ENCOUNTER — NON-APPOINTMENT (OUTPATIENT)
Age: 55
End: 2025-06-25

## 2025-06-26 ENCOUNTER — APPOINTMENT (OUTPATIENT)
Dept: CARDIOLOGY | Facility: CLINIC | Age: 55
End: 2025-06-26

## 2025-07-02 ENCOUNTER — APPOINTMENT (OUTPATIENT)
Dept: CARDIOLOGY | Facility: CLINIC | Age: 55
End: 2025-07-02

## 2025-07-02 ENCOUNTER — APPOINTMENT (OUTPATIENT)
Dept: NEUROLOGY | Facility: CLINIC | Age: 55
End: 2025-07-02
Payer: COMMERCIAL

## 2025-07-02 PROCEDURE — 99214 OFFICE O/P EST MOD 30 MIN: CPT

## 2025-07-02 PROCEDURE — ZZZZZ: CPT

## 2025-07-09 ENCOUNTER — APPOINTMENT (OUTPATIENT)
Dept: NEUROLOGY | Facility: CLINIC | Age: 55
End: 2025-07-09

## 2025-07-30 ENCOUNTER — APPOINTMENT (OUTPATIENT)
Dept: CARDIOLOGY | Facility: CLINIC | Age: 55
End: 2025-07-30

## 2025-08-26 ENCOUNTER — APPOINTMENT (OUTPATIENT)
Dept: SURGERY | Facility: CLINIC | Age: 55
End: 2025-08-26
Payer: COMMERCIAL

## 2025-08-26 VITALS — BODY MASS INDEX: 26.48 KG/M2 | WEIGHT: 185 LBS | HEIGHT: 70 IN

## 2025-08-26 DIAGNOSIS — S39.011A STRAIN OF MUSCLE, FASCIA AND TENDON OF ABDOMEN, INITIAL ENCOUNTER: ICD-10-CM

## 2025-08-26 PROCEDURE — 99213 OFFICE O/P EST LOW 20 MIN: CPT

## 2025-09-12 ENCOUNTER — APPOINTMENT (OUTPATIENT)
Dept: UROLOGY | Facility: CLINIC | Age: 55
End: 2025-09-12
Payer: COMMERCIAL

## 2025-09-12 VITALS
HEIGHT: 70 IN | WEIGHT: 185 LBS | BODY MASS INDEX: 26.48 KG/M2 | HEART RATE: 85 BPM | DIASTOLIC BLOOD PRESSURE: 103 MMHG | SYSTOLIC BLOOD PRESSURE: 159 MMHG | OXYGEN SATURATION: 98 %

## 2025-09-12 DIAGNOSIS — R33.9 RETENTION OF URINE, UNSPECIFIED: ICD-10-CM

## 2025-09-12 DIAGNOSIS — N13.8 BENIGN PROSTATIC HYPERPLASIA WITH LOWER URINARY TRACT SYMPMS: ICD-10-CM

## 2025-09-12 DIAGNOSIS — N40.1 BENIGN PROSTATIC HYPERPLASIA WITH LOWER URINARY TRACT SYMPMS: ICD-10-CM

## 2025-09-12 PROCEDURE — 51798 US URINE CAPACITY MEASURE: CPT

## 2025-09-12 PROCEDURE — 99214 OFFICE O/P EST MOD 30 MIN: CPT

## 2025-09-12 PROCEDURE — G2211 COMPLEX E/M VISIT ADD ON: CPT | Mod: NC

## 2025-09-12 RX ORDER — PROPRANOLOL HYDROCHLORIDE 20 MG/1
20 TABLET ORAL
Refills: 0 | Status: ACTIVE | COMMUNITY

## 2025-09-12 RX ORDER — TAMSULOSIN HYDROCHLORIDE 0.4 MG/1
0.4 CAPSULE ORAL
Qty: 90 | Refills: 3 | Status: ACTIVE | COMMUNITY
Start: 2025-09-12 | End: 1900-01-01

## 2025-09-12 RX ORDER — CLONAZEPAM 1 MG/1
1 TABLET ORAL
Refills: 0 | Status: ACTIVE | COMMUNITY

## 2025-09-12 RX ORDER — ISOSORBIDE DINITRATE 10 MG/1
10 TABLET ORAL
Refills: 0 | Status: ACTIVE | COMMUNITY